# Patient Record
Sex: FEMALE | Race: WHITE | NOT HISPANIC OR LATINO | Employment: UNEMPLOYED | URBAN - METROPOLITAN AREA
[De-identification: names, ages, dates, MRNs, and addresses within clinical notes are randomized per-mention and may not be internally consistent; named-entity substitution may affect disease eponyms.]

---

## 2017-08-23 ENCOUNTER — HOSPITAL ENCOUNTER (EMERGENCY)
Facility: HOSPITAL | Age: 82
Discharge: HOME | End: 2017-08-23
Attending: EMERGENCY MEDICINE
Payer: MEDICARE

## 2017-08-23 ENCOUNTER — APPOINTMENT (EMERGENCY)
Dept: CT IMAGING | Facility: HOSPITAL | Age: 82
End: 2017-08-23
Payer: MEDICARE

## 2017-08-23 ENCOUNTER — APPOINTMENT (EMERGENCY)
Dept: RADIOLOGY | Facility: HOSPITAL | Age: 82
End: 2017-08-23
Payer: MEDICARE

## 2017-08-23 VITALS
DIASTOLIC BLOOD PRESSURE: 60 MMHG | TEMPERATURE: 98.4 F | BODY MASS INDEX: 31.08 KG/M2 | WEIGHT: 158.3 LBS | RESPIRATION RATE: 18 BRPM | HEART RATE: 61 BPM | OXYGEN SATURATION: 97 % | HEIGHT: 60 IN | SYSTOLIC BLOOD PRESSURE: 124 MMHG

## 2017-08-23 DIAGNOSIS — J20.9 BRONCHITIS, ACUTE: Primary | ICD-10-CM

## 2017-08-23 DIAGNOSIS — Z79.01 ANTICOAGULANT LONG-TERM USE: ICD-10-CM

## 2017-08-23 DIAGNOSIS — R91.8 PULMONARY NODULES: ICD-10-CM

## 2017-08-23 LAB
ALBUMIN SERPL BCP-MCNC: 3.8 G/DL (ref 3.5–5)
ALP SERPL-CCNC: 102 U/L (ref 46–116)
ALT SERPL W P-5'-P-CCNC: 21 U/L (ref 12–78)
ANION GAP SERPL CALCULATED.3IONS-SCNC: 11 MMOL/L (ref 4–13)
AST SERPL W P-5'-P-CCNC: 18 U/L (ref 5–45)
BASOPHILS # BLD AUTO: 0.05 THOUSANDS/ΜL (ref 0–0.1)
BASOPHILS NFR BLD AUTO: 1 % (ref 0–1)
BILIRUB SERPL-MCNC: 0.4 MG/DL (ref 0.2–1)
BUN SERPL-MCNC: 17 MG/DL (ref 5–25)
CALCIUM SERPL-MCNC: 9.1 MG/DL (ref 8.3–10.1)
CHLORIDE SERPL-SCNC: 98 MMOL/L (ref 100–108)
CO2 SERPL-SCNC: 26 MMOL/L (ref 21–32)
CREAT SERPL-MCNC: 1.09 MG/DL (ref 0.6–1.3)
EOSINOPHIL # BLD AUTO: 0.23 THOUSAND/ΜL (ref 0–0.61)
EOSINOPHIL NFR BLD AUTO: 4 % (ref 0–6)
ERYTHROCYTE [DISTWIDTH] IN BLOOD BY AUTOMATED COUNT: 13.2 % (ref 11.6–15.1)
GFR SERPL CREATININE-BSD FRML MDRD: 46 ML/MIN/1.73SQ M
GLUCOSE SERPL-MCNC: 115 MG/DL (ref 65–140)
HCT VFR BLD AUTO: 34.7 % (ref 34.8–46.1)
HGB BLD-MCNC: 12 G/DL (ref 11.5–15.4)
INR PPP: 1.71 (ref 0.86–1.16)
LYMPHOCYTES # BLD AUTO: 1.61 THOUSANDS/ΜL (ref 0.6–4.47)
LYMPHOCYTES NFR BLD AUTO: 29 % (ref 14–44)
MCH RBC QN AUTO: 31.6 PG (ref 26.8–34.3)
MCHC RBC AUTO-ENTMCNC: 34.6 G/DL (ref 31.4–37.4)
MCV RBC AUTO: 91 FL (ref 82–98)
MONOCYTES # BLD AUTO: 0.54 THOUSAND/ΜL (ref 0.17–1.22)
MONOCYTES NFR BLD AUTO: 10 % (ref 4–12)
NEUTROPHILS # BLD AUTO: 3.19 THOUSANDS/ΜL (ref 1.85–7.62)
NEUTS SEG NFR BLD AUTO: 57 % (ref 43–75)
NRBC BLD AUTO-RTO: 0 /100 WBCS
PLATELET # BLD AUTO: 240 THOUSANDS/UL (ref 149–390)
PMV BLD AUTO: 8.8 FL (ref 8.9–12.7)
POTASSIUM SERPL-SCNC: 3.5 MMOL/L (ref 3.5–5.3)
PROT SERPL-MCNC: 7.5 G/DL (ref 6.4–8.2)
PROTHROMBIN TIME: 20.5 SECONDS (ref 12.1–14.4)
RBC # BLD AUTO: 3.8 MILLION/UL (ref 3.81–5.12)
SODIUM SERPL-SCNC: 135 MMOL/L (ref 136–145)
SPECIMEN SOURCE: NORMAL
TROPONIN I BLD-MCNC: 0 NG/ML (ref 0–0.08)
TROPONIN I SERPL-MCNC: <0.02 NG/ML
WBC # BLD AUTO: 5.64 THOUSAND/UL (ref 4.31–10.16)

## 2017-08-23 PROCEDURE — 71275 CT ANGIOGRAPHY CHEST: CPT

## 2017-08-23 PROCEDURE — 85610 PROTHROMBIN TIME: CPT | Performed by: EMERGENCY MEDICINE

## 2017-08-23 PROCEDURE — 93005 ELECTROCARDIOGRAM TRACING: CPT | Performed by: EMERGENCY MEDICINE

## 2017-08-23 PROCEDURE — 99285 EMERGENCY DEPT VISIT HI MDM: CPT

## 2017-08-23 PROCEDURE — 84484 ASSAY OF TROPONIN QUANT: CPT

## 2017-08-23 PROCEDURE — 36415 COLL VENOUS BLD VENIPUNCTURE: CPT

## 2017-08-23 PROCEDURE — 71020 HB CHEST X-RAY 2VW FRONTAL&LATL: CPT

## 2017-08-23 PROCEDURE — 80053 COMPREHEN METABOLIC PANEL: CPT | Performed by: EMERGENCY MEDICINE

## 2017-08-23 PROCEDURE — 84484 ASSAY OF TROPONIN QUANT: CPT | Performed by: EMERGENCY MEDICINE

## 2017-08-23 PROCEDURE — 85025 COMPLETE CBC W/AUTO DIFF WBC: CPT | Performed by: EMERGENCY MEDICINE

## 2017-08-23 RX ORDER — DOXYCYCLINE HYCLATE 100 MG/1
100 CAPSULE ORAL 2 TIMES DAILY
Qty: 14 CAPSULE | Refills: 0 | Status: SHIPPED | OUTPATIENT
Start: 2017-08-23 | End: 2017-08-30

## 2017-08-23 RX ORDER — WARFARIN SODIUM 7.5 MG/1
7.5 TABLET ORAL
COMMUNITY
End: 2020-06-17 | Stop reason: HOSPADM

## 2017-08-23 RX ORDER — DOXYCYCLINE HYCLATE 100 MG/1
100 CAPSULE ORAL ONCE
Status: COMPLETED | OUTPATIENT
Start: 2017-08-23 | End: 2017-08-23

## 2017-08-23 RX ORDER — BENAZEPRIL HYDROCHLORIDE AND HYDROCHLOROTHIAZIDE 20; 12.5 MG/1; MG/1
1 TABLET ORAL DAILY
Qty: 14 TABLET | Refills: 0 | Status: SHIPPED | OUTPATIENT
Start: 2017-08-23 | End: 2020-06-17 | Stop reason: HOSPADM

## 2017-08-23 RX ADMIN — IOHEXOL 85 ML: 350 INJECTION, SOLUTION INTRAVENOUS at 19:50

## 2017-08-23 RX ADMIN — SODIUM CHLORIDE 500 ML: 0.9 INJECTION, SOLUTION INTRAVENOUS at 20:00

## 2017-08-23 RX ADMIN — DOXYCYCLINE HYCLATE 100 MG: 100 CAPSULE, GELATIN COATED ORAL at 21:38

## 2017-08-24 LAB
ATRIAL RATE: 62 BPM
P AXIS: 68 DEGREES
PR INTERVAL: 218 MS
QRS AXIS: -54 DEGREES
QRSD INTERVAL: 98 MS
QT INTERVAL: 480 MS
QTC INTERVAL: 487 MS
T WAVE AXIS: 50 DEGREES
VENTRICULAR RATE: 62 BPM

## 2018-03-10 ENCOUNTER — APPOINTMENT (EMERGENCY)
Dept: CT IMAGING | Facility: HOSPITAL | Age: 83
End: 2018-03-10
Payer: MEDICARE

## 2018-03-10 ENCOUNTER — HOSPITAL ENCOUNTER (EMERGENCY)
Facility: HOSPITAL | Age: 83
Discharge: HOME | End: 2018-03-11
Attending: EMERGENCY MEDICINE | Admitting: EMERGENCY MEDICINE
Payer: MEDICARE

## 2018-03-10 ENCOUNTER — APPOINTMENT (EMERGENCY)
Dept: RADIOLOGY | Facility: HOSPITAL | Age: 83
End: 2018-03-10
Payer: MEDICARE

## 2018-03-10 DIAGNOSIS — J20.9 ACUTE BRONCHITIS: ICD-10-CM

## 2018-03-10 DIAGNOSIS — R09.81 NASAL CONGESTION: Primary | ICD-10-CM

## 2018-03-10 DIAGNOSIS — R09.82 POST-NASAL DRIP: ICD-10-CM

## 2018-03-10 LAB
ANION GAP SERPL CALCULATED.3IONS-SCNC: 14 MMOL/L (ref 4–13)
BASOPHILS # BLD AUTO: 0.06 THOUSANDS/ΜL (ref 0–0.1)
BASOPHILS NFR BLD AUTO: 1 % (ref 0–1)
BUN SERPL-MCNC: 19 MG/DL (ref 5–25)
CALCIUM SERPL-MCNC: 8.5 MG/DL (ref 8.3–10.1)
CHLORIDE SERPL-SCNC: 102 MMOL/L (ref 100–108)
CO2 SERPL-SCNC: 23 MMOL/L (ref 21–32)
CREAT SERPL-MCNC: 0.98 MG/DL (ref 0.6–1.3)
EOSINOPHIL # BLD AUTO: 0.21 THOUSAND/ΜL (ref 0–0.61)
EOSINOPHIL NFR BLD AUTO: 3 % (ref 0–6)
ERYTHROCYTE [DISTWIDTH] IN BLOOD BY AUTOMATED COUNT: 14.1 % (ref 11.6–15.1)
GFR SERPL CREATININE-BSD FRML MDRD: 52 ML/MIN/1.73SQ M
GLUCOSE SERPL-MCNC: 103 MG/DL (ref 65–140)
HCT VFR BLD AUTO: 34.5 % (ref 34.8–46.1)
HGB BLD-MCNC: 11.7 G/DL (ref 11.5–15.4)
INR PPP: 1.21 (ref 0.86–1.16)
LYMPHOCYTES # BLD AUTO: 2.05 THOUSANDS/ΜL (ref 0.6–4.47)
LYMPHOCYTES NFR BLD AUTO: 32 % (ref 14–44)
MCH RBC QN AUTO: 31.7 PG (ref 26.8–34.3)
MCHC RBC AUTO-ENTMCNC: 33.9 G/DL (ref 31.4–37.4)
MCV RBC AUTO: 94 FL (ref 82–98)
MONOCYTES # BLD AUTO: 0.52 THOUSAND/ΜL (ref 0.17–1.22)
MONOCYTES NFR BLD AUTO: 8 % (ref 4–12)
NEUTROPHILS # BLD AUTO: 3.54 THOUSANDS/ΜL (ref 1.85–7.62)
NEUTS SEG NFR BLD AUTO: 55 % (ref 43–75)
NRBC BLD AUTO-RTO: 0 /100 WBCS
PLATELET # BLD AUTO: 201 THOUSANDS/UL (ref 149–390)
PMV BLD AUTO: 9.8 FL (ref 8.9–12.7)
POTASSIUM SERPL-SCNC: 3.2 MMOL/L (ref 3.5–5.3)
PROTHROMBIN TIME: 15.6 SECONDS (ref 12.1–14.4)
RBC # BLD AUTO: 3.69 MILLION/UL (ref 3.81–5.12)
SODIUM SERPL-SCNC: 139 MMOL/L (ref 136–145)
WBC # BLD AUTO: 6.4 THOUSAND/UL (ref 4.31–10.16)

## 2018-03-10 PROCEDURE — 93005 ELECTROCARDIOGRAM TRACING: CPT

## 2018-03-10 PROCEDURE — 85025 COMPLETE CBC W/AUTO DIFF WBC: CPT | Performed by: EMERGENCY MEDICINE

## 2018-03-10 PROCEDURE — 36415 COLL VENOUS BLD VENIPUNCTURE: CPT | Performed by: EMERGENCY MEDICINE

## 2018-03-10 PROCEDURE — 94640 AIRWAY INHALATION TREATMENT: CPT

## 2018-03-10 PROCEDURE — 71046 X-RAY EXAM CHEST 2 VIEWS: CPT

## 2018-03-10 PROCEDURE — 85610 PROTHROMBIN TIME: CPT | Performed by: EMERGENCY MEDICINE

## 2018-03-10 PROCEDURE — 71275 CT ANGIOGRAPHY CHEST: CPT

## 2018-03-10 PROCEDURE — 80048 BASIC METABOLIC PNL TOTAL CA: CPT | Performed by: EMERGENCY MEDICINE

## 2018-03-10 RX ORDER — ALBUTEROL SULFATE 2.5 MG/3ML
2.5 SOLUTION RESPIRATORY (INHALATION) ONCE
Status: COMPLETED | OUTPATIENT
Start: 2018-03-10 | End: 2018-03-10

## 2018-03-10 RX ADMIN — ALBUTEROL SULFATE 2.5 MG: 2.5 SOLUTION RESPIRATORY (INHALATION) at 20:34

## 2018-03-10 RX ADMIN — IOHEXOL 85 ML: 350 INJECTION, SOLUTION INTRAVENOUS at 23:02

## 2018-03-11 VITALS
HEART RATE: 58 BPM | DIASTOLIC BLOOD PRESSURE: 78 MMHG | SYSTOLIC BLOOD PRESSURE: 171 MMHG | RESPIRATION RATE: 16 BRPM | BODY MASS INDEX: 31.82 KG/M2 | OXYGEN SATURATION: 96 % | WEIGHT: 162.92 LBS | TEMPERATURE: 98.8 F

## 2018-03-11 PROCEDURE — 99285 EMERGENCY DEPT VISIT HI MDM: CPT

## 2018-03-11 RX ORDER — ALBUTEROL SULFATE 90 UG/1
2 AEROSOL, METERED RESPIRATORY (INHALATION) EVERY 4 HOURS PRN
Qty: 1 INHALER | Refills: 0 | Status: SHIPPED | OUTPATIENT
Start: 2018-03-11

## 2018-03-11 RX ORDER — FLUTICASONE PROPIONATE 50 MCG
1 SPRAY, SUSPENSION (ML) NASAL DAILY PRN
Qty: 16 G | Refills: 0 | Status: SHIPPED | OUTPATIENT
Start: 2018-03-11
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
## 2018-03-11 NOTE — DISCHARGE INSTRUCTIONS
Take the Flonase as prescribed  Use your saline nasal spray as needed to help with your congestion  You can take Mucinex as needed to help with continued congestion  Use the albuterol as needed for any coughing spells or feeling like her chest is getting tight again  Make sure you drink plenty of fluids while you are sick  Use a humidifier at night to help  Follow up with your primary care doctor to make sure you are doing better  Take your Coumadin as prescribed, and follow up closely with your doctor for further monitoring of this, as your level was low today  Acute Bronchitis   WHAT YOU SHOULD KNOW:   Acute bronchitis is swelling and irritation in the air passages of your lungs  This irritation may cause you to cough or have other breathing problems  Acute bronchitis often starts because of another viral illness, such as a cold or the flu  The illness spreads from your nose and throat to your windpipe and airways  Bronchitis is often called a chest cold  Acute bronchitis lasts about 2 weeks and is usually not a serious illness  AFTER YOU LEAVE:   Medicines:   · Ibuprofen or acetaminophen:  These medicines help lower a fever  They are available without a doctor's order  Ask your healthcare provider which medicine is right for you  Ask how much to take and how often to take it  Follow directions  These medicines can cause stomach bleeding if not taken correctly  Ibuprofen can cause kidney damage  Do not take ibuprofen if you have kidney disease, an ulcer, or allergies to aspirin  Acetaminophen can cause liver damage  Do not drink alcohol if you take acetaminophen  · Cough medicine: This medicine helps loosen mucus in your lungs and make it easier to cough up  This can help you breathe easier  · Inhalers: You may need one or more inhalers to help you breathe easier and cough less  An inhaler gives your medicine in a mist form so that you can breathe it into your lungs   Ask your healthcare provider to show you how to use your inhaler correctly  · Steroid medicine:  Steroid medicine helps open your air passages so you can breathe easier  · Take your medicine as directed  Call your healthcare provider if you think your medicine is not helping or if you have side effects  Tell him if you are allergic to any medicine  Keep a list of the medicines, vitamins, and herbs you take  Include the amounts, and when and why you take them  Bring the list or the pill bottles to follow-up visits  Carry your medicine list with you in case of an emergency  How to use an inhaler:   · Shake the inhaler well to make sure you get the correct amount of medicine per puff  Remove the cover from your inhaler's mouthpiece  If you are using a spacer, connect your inhaler to the flat end of the spacer  · Exhale as much air from your lungs as you can  Put the mouthpiece in your mouth past your front teeth and rest it on the top of your tongue  Do not block the mouthpiece opening with your tongue  · Breathe in through your mouth at a slow and steady rate  As you do this, press the inhaler to release the puff of medicine  Finish breathing in slowly and deeply as you inhale the medicine  When your lungs are full, hold your breath for 10 seconds  Then breathe out slowly through puckered lips or through your nose  · If you need to take more puffs, wait at least 1 minute between each puff  · Rinse your mouth with water after you use the inhaler  This may keep you from getting a mouth infection or irritation  · Follow the instructions that come with your inhaler to clean it  You should clean your inhaler at least once a week  Ways to care for yourself:   · Avoid alcohol:  Alcohol dulls your urge to cough and sneeze  When you have bronchitis, you need to be able to cough and sneeze to clear your air passages  Alcohol also causes your body to lose fluid   This can make the mucus in your lungs thicker and harder to cough up  · Avoid irritants in the air:  Do not smoke or allow others to smoke around you  Avoid chemicals, fumes, and dust  Wear a face mask if you must work around dust or fumes  Stay inside on days when air pollution levels are high  If you have allergies, stay inside when pollen counts are high  Avoid aerosol products  This includes spray-on deodorant, bug spray, and hair spray  · Drink more liquids:  Most people should drink at least 8 eight-ounce cups of water a day  You may need to drink more liquids when you have acute bronchitis  Liquids help keep your air passages moist and help you cough up mucus  · Get more rest:  You may feel like resting more  Slowly start to do more each day  Rest when you feel it is needed  · Eat healthy foods:  Eat a variety healthy foods every day  Your diet should include fruits, vegetables, breads, and protein (such as chicken, fish, and beans)  Dairy products (such as milk, cheese, and ice cream) can sometimes increase the amount of mucus your body makes  Ask if you should decrease your intake of dairy products  · Use a humidifier:  Use a cool mist humidifier to increase air moisture in your home  This may make it easier for you to breathe and help decrease your cough  Decrease your risk of acute bronchitis:   · Get the vaccinations you need:  Ask your healthcare provider if you should get vaccinated against the flu or pneumonia  · Avoid things that may irritate your lungs:  Stay inside or cover your mouth and nose with a scarf when you are outside during cold weather  You should also stay inside on days when air pollution levels are high  If you have allergies, stay inside when pollen counts are high  Avoid using aerosol products in your home  This includes spray-on deodorant, bug spray, and hair spray  · Avoid the spread of germs:        [facility] your hands often with soap and water  Carry germ-killing gel with you   You can use the gel to clean your hands when there is no soap and water available  ¨ Do not touch your eyes, nose, or mouth unless you have washed your hands first     ¨ Always cover your mouth when you cough  Cough into a tissue or your shirtsleeve so you do not spread germs from your hands  ¨ Try to avoid people who have a cold or the flu  If you are sick, stay away from others as much as possible  Follow up with your healthcare provider as directed:  Write down questions you have so you will remember to ask them during your follow-up visits  Contact your healthcare provider if:   · You have a fever  · Your skin becomes itchy or you have a rash after you take your medicine  · Your breathing problems do not go away or get worse  · Your cough does not get better with treatment  · You cough up blood  · You have questions or concerns about your condition or care  Seek care immediately or call 911 if:   · You faint  · Your lips or fingernails turn blue  · You feel like you are not getting enough air when you breathe  · You have swelling of your lips, tongue, or throat that makes it hard to breathe or swallow  © 2014 [address] is for End User's use only and may not be sold, redistributed or otherwise used for commercial purposes  All illustrations and images included in CareNotes® are the copyrighted property of [employer]  or Terry Benavides  The above information is an  only  It is not intended as medical advice for individual conditions or treatments  Talk to your doctor, nurse or pharmacist before following any medical regimen to see if it is safe and effective for you  Rhinosinusitis   WHAT YOU NEED TO KNOW:   Rhinosinusitis (RS) is inflammation of your nose and sinuses  It commonly begins as a virus, often as a common cold  Viruses usually last 7 to 10 days and do not need treatment   When the virus does not get better on its own, you may have bacterial RS  This means that bacteria have begun to grow inside your sinuses  Acute RS lasts less than 4 weeks  Chronic RS lasts 12 weeks or more  Recurrent RS is when you have 4 or more episodes of RS in one year  DISCHARGE INSTRUCTIONS:   Return to the emergency department if:   · Your eye and eyelid are red, swollen, and painful  · You cannot open your eye  · You have double vision or you cannot see  · Your eyeball bulges out or you cannot move your eye  · You are more sleepy than normal or you notice changes in your ability to think, move, or talk  · You have a stiff neck, a fever, or a bad headache  · You have swelling of your forehead or scalp  Contact your healthcare provider if:   · Your symptoms are worse or do not improve after 3 to 5 days of treatment  · You have questions or concerns about your condition or care  Medicines: You may need any of the following:  · Acetaminophen  decreases pain and fever  It is available without a doctor's order  Ask how much to take and how often to take it  Follow directions  Acetaminophen can cause liver damage if not taken correctly  · NSAIDs , such as ibuprofen, help decrease swelling, pain, and fever  This medicine is available with or without a doctor's order  NSAIDs can cause stomach bleeding or kidney problems in certain people  If you take blood thinner medicine, always ask your healthcare provider if NSAIDs are safe for you  Always read the medicine label and follow directions  · Nasal steroid sprays  decrease inflammation in your nose and sinuses  · Decongestants  reduce swelling and drain mucus in the nose and sinuses  They may help you breathe easier  · Antihistamines  dry mucus in the nose and relieve sneezing  · Antibiotics  treat a bacterial infection and may be needed if your symptoms do not improve or they get worse  · Take your medicine as directed    Contact your healthcare provider if you think your medicine is not helping or if you have side effects  Tell him or her if you are allergic to any medicine  Keep a list of the medicines, vitamins, and herbs you take  Include the amounts, and when and why you take them  Bring the list or the pill bottles to follow-up visits  Carry your medicine list with you in case of an emergency  Self-care:   · Rinse your sinuses  Use a sinus rinse device to rinse your nasal passages with a saline (salt water) solution  This will help thin the mucus in your nose and rinse away pollen and dirt  It will also help reduce swelling so you can breathe normally  Ask your healthcare provider how often to do this  · Breathe in steam   Heat a bowl of water until you see steam  Lean over the bowl and make a tent over your head with a large towel  Breathe deeply for about 20 minutes  Be careful not to get too close to the steam or burn yourself  Do this 3 times a day  You can also breathe deeply when you take a hot shower  · Sleep with your head elevated  Place an extra pillow under your head before you go to sleep to help your sinuses drain  · Drink liquids as directed  Ask your healthcare provider how much liquid to drink each day and which liquids are best for you  Liquids will thin the mucus in your nose and help it drain  Avoid drinks that contain alcohol or caffeine  · Do not smoke, and avoid secondhand smoke  Nicotine and other chemicals in cigarettes and cigars can make your symptoms worse  Ask your healthcare provider for information if you currently smoke and need help to quit  E-cigarettes or smokeless tobacco still contain nicotine  Talk to your healthcare provider before you use these products  Follow up with your healthcare provider as directed: Follow up if your symptoms are worse or not better after 3 to 5 days of treatment  Write down your questions so you remember to ask them during your visits    © 2017 [employer] Information is for End User's use only and may not be sold, redistributed or otherwise used for commercial purposes  All illustrations and images included in CareNotes® are the copyrighted property of [employer]  or Terry Benavides  The above information is an  only  It is not intended as medical advice for individual conditions or treatments  Talk to your doctor, nurse or pharmacist before following any medical regimen to see if it is safe and effective for you  Postnasal Drip   AMBULATORY CARE:   Postnasal drip  is a condition that causes a large amount of mucus to collect in your throat or nose  It may also be called upper airway cough syndrome because the mucus causes repeated coughing  You may have a sore throat, or throat tissues may swell  This may feel like a lump in your throat  You may also feel like you need to clear your throat often  Contact your healthcare provider if:   · You have trouble breathing because of the mucus  · You have new or worsening symptoms, even with treatment  · You have signs of an infection, such as yellow or green mucus, or a fever  · You have questions or concerns about your condition or care  Treatment  may include any of the following:  · Medicines  may be given to thin the mucus  You may need to swallow the medicine or use a device to flush your sinuses with liquid squirted into your nose  Nasal sprays may also be needed to keep the tissues in your nose moist  Medicines can also relieve congestion  Allergy medicine may help if your symptoms are caused by seasonal allergies, such as hay fever  You may need medicine to help control GERD  · Antibiotics  may be needed to treat a bacterial infection  Manage postnasal drip:   · Use a humidifier or vaporizer  Use a cool mist humidifier or a vaporizer to increase air moisture in your home  This may make it easier for you to breathe  · Drink more liquids as directed    Liquids help keep your air passages moist and help you cough up mucus  Ask how much liquid to drink each day and which liquids are best for you  · Avoid cold air and dry, heated air  Cold or dry air can trigger postnasal drip  Try to stay inside on cold days, or keep your mouth covered  Do not stay long in areas that have dry, heated air  · Do not smoke, and avoid secondhand smoke  Nicotine and other chemicals in cigarettes and cigars can irritate your throat and make coughing worse  Ask your healthcare provider for information if you currently smoke and need help to quit  E-cigarettes or smokeless tobacco still contain nicotine  Talk to your healthcare provider before you use these products  Follow up with your healthcare provider as directed:  Write down your questions so you remember to ask them during your visits  © 2017 [address] [facility] Information is for End User's use only and may not be sold, redistributed or otherwise used for commercial purposes  All illustrations and images included in CareNotes® are the copyrighted property of [employer]  or Terry Benavides  The above information is an  only  It is not intended as medical advice for individual conditions or treatments  Talk to your doctor, nurse or pharmacist before following any medical regimen to see if it is safe and effective for you

## 2018-03-12 LAB
ATRIAL RATE: 60 BPM
P AXIS: 66 DEGREES
PR INTERVAL: 240 MS
QRS AXIS: -35 DEGREES
QRSD INTERVAL: 102 MS
QT INTERVAL: 480 MS
QTC INTERVAL: 480 MS
T WAVE AXIS: 8 DEGREES
VENTRICULAR RATE: 60 BPM

## 2018-03-12 PROCEDURE — 93010 ELECTROCARDIOGRAM REPORT: CPT | Performed by: INTERNAL MEDICINE

## 2018-12-25 ENCOUNTER — HOSPITAL ENCOUNTER (EMERGENCY)
Facility: HOSPITAL | Age: 83
Discharge: HOME | End: 2018-12-26
Attending: EMERGENCY MEDICINE | Admitting: EMERGENCY MEDICINE
Payer: MEDICARE

## 2018-12-25 DIAGNOSIS — S09.90XA MINOR HEAD INJURY, INITIAL ENCOUNTER: ICD-10-CM
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
## 2018-12-25 DIAGNOSIS — S01.111A EYEBROW LACERATION, RIGHT, INITIAL ENCOUNTER: ICD-10-CM

## 2018-12-25 DIAGNOSIS — Z79.01 ANTICOAGULATED: ICD-10-CM

## 2018-12-25 PROCEDURE — 93005 ELECTROCARDIOGRAM TRACING: CPT

## 2018-12-25 PROCEDURE — 99284 EMERGENCY DEPT VISIT MOD MDM: CPT

## 2018-12-25 RX ORDER — ACETAMINOPHEN 325 MG/1
975 TABLET ORAL ONCE
Status: COMPLETED | OUTPATIENT
Start: 2018-12-26 | End: 2018-12-26

## 2018-12-25 RX ORDER — LIDOCAINE HYDROCHLORIDE AND EPINEPHRINE 10; 10 MG/ML; UG/ML
20 INJECTION, SOLUTION INFILTRATION; PERINEURAL ONCE
Status: COMPLETED | OUTPATIENT
Start: 2018-12-26 | End: 2018-12-26

## 2018-12-26 ENCOUNTER — APPOINTMENT (EMERGENCY)
Dept: CT IMAGING | Facility: HOSPITAL | Age: 83
End: 2018-12-26
Payer: MEDICARE

## 2018-12-26 VITALS
RESPIRATION RATE: 40 BRPM | SYSTOLIC BLOOD PRESSURE: 134 MMHG | WEIGHT: 160.94 LBS | BODY MASS INDEX: 31.43 KG/M2 | HEART RATE: 56 BPM | OXYGEN SATURATION: 94 % | DIASTOLIC BLOOD PRESSURE: 55 MMHG

## 2018-12-26 LAB
INR PPP: 3.06 (ref 0.86–1.17)
PROTHROMBIN TIME: 31.2 SECONDS (ref 11.8–14.2)

## 2018-12-26 PROCEDURE — 36415 COLL VENOUS BLD VENIPUNCTURE: CPT | Performed by: EMERGENCY MEDICINE

## 2018-12-26 PROCEDURE — 70450 CT HEAD/BRAIN W/O DYE: CPT

## 2018-12-26 PROCEDURE — 85610 PROTHROMBIN TIME: CPT | Performed by: EMERGENCY MEDICINE

## 2018-12-26 RX ADMIN — ACETAMINOPHEN 975 MG: 325 TABLET, FILM COATED ORAL at 00:10

## 2018-12-26 RX ADMIN — LIDOCAINE HYDROCHLORIDE,EPINEPHRINE BITARTRATE 20 ML: 10; .01 INJECTION, SOLUTION INFILTRATION; PERINEURAL at 00:10

## 2018-12-26 RX ADMIN — Medication 1 APPLICATION: at 00:16

## 2018-12-26 NOTE — ED PROVIDER NOTES
H&P Exam - Trauma   Jesus Daley 80 y o  female MRN: [id number]  Unit/Bed#: ED 13/ED 13 Encounter: [id number]    Assessment/Plan   Trauma Alert: Trauma Acuity: Trauma Evaluation  Model of Arrival:   via    Trauma Team: Current Providers  Attending Provider: Ruby Franco DO  Registered Nurse: Daylene Brittle, RN  Consultants: None    Trauma Active Problems:   1) Minor head injury  2) Facial Laceration  3) Anticoagulated on coumadin  4) Mechanical Fall    Trauma Plan:   1) Minor head injury- CT head negative, clears Nexus criteria  2) Facial laceration- tetanus UTD, repaired as noted  3) Anticoagulated - INR noted and reviewed with pt  4) Mechanical fall- tertiary exam completed, no further injuries or findings, able to ambulate unassisted, feels safe going home in care or family    Chief Complaint:   Chief Complaint   Patient presents with    Fall     pt fell in her bathroom, denies LOC on thiners pt thinks she hit her head on the tub, has a lac on her head       History of Present Illness   HPI:  Jesus Daley is a 80 y o  female who presents with fall and head injury  Mechanism: Mechanical fall off toilet          80year-old female history of factor 5 Leiden on Coumadin presenting with head injury  Patient is here with family, she was self cathing herself on the bathroom toilet she leaned forward lost her balance fell forward striking her right eyebrow on the bathtub  She did not not syncopize or lose consciousness she had bleeding from her right eyebrow she was able to get up and ambulate she is here with family for further evaluation    Patient was concerned about the head injury on Coumadin, she notes a large laceration above her right eyebrow but otherwise denies severe headache neck pain or stiffness weakness with paresthesias or anesthesia she denies having eye pain or visual disturbance facial pain difficulty swallowing chest pain cough shortness of breath nausea vomiting or abdominal pain back pain change in bowels or bladder other muscle skeletal complaint or injury  Complete review systems otherwise negative as noted          Review of Systems   Constitutional: Negative for activity change, appetite change, chills and fever  HENT: Negative for dental problem, nosebleeds, trouble swallowing and voice change  Eyes: Negative for photophobia, pain, redness and visual disturbance  Respiratory: Negative for cough and shortness of breath  Cardiovascular: Negative for chest pain and palpitations  Gastrointestinal: Negative for abdominal pain, nausea and vomiting  Genitourinary: Negative for dysuria, frequency and urgency  Musculoskeletal: Negative for arthralgias, back pain, gait problem, joint swelling, myalgias, neck pain and neck stiffness  Skin: Positive for wound  Negative for color change and rash  Neurological: Negative for dizziness, syncope, weakness, light-headedness and headaches  Hematological: Negative for adenopathy  Does not bruise/bleed easily  Psychiatric/Behavioral: Negative for agitation and behavioral problems  All other systems reviewed and are negative  Historical Information     Immunizations: There is no immunization history on file for this patient  Past Medical History:   Diagnosis Date    Cancer ([facility] )     BREAST , OVARIAN    Factor 5 Leiden mutation, heterozygous ([facility] )     Hypertension      History reviewed  No pertinent family history  Past Surgical History:   Procedure Laterality Date    BREAST SURGERY      HYSTERECTOMY         Social History     Social History    Marital status:       Spouse name: N/A    Number of children: N/A    Years of education: N/A     Social History Main Topics    Smoking status: Former Smoker    Smokeless tobacco: Never Used    Alcohol use 0 6 - 1 2 oz/week     1 - 2 Glasses of wine per week    Drug use: No    Sexual activity: Not Asked     Other Topics Concern    None     Social History Narrative    None       Family History: non-contributory    Meds/Allergies   Prior to Admission Medications   Prescriptions Last Dose Informant Patient Reported? Taking? albuterol (PROVENTIL HFA,VENTOLIN HFA) 90 mcg/act inhaler   No No   Sig: Inhale 2 puffs every 4 (four) hours as needed for wheezing or shortness of breath   benazepril-hydrochlorthiazide (LOTENSIN HCT) 20-12 5 MG per tablet   No No   Sig: Take 1 tablet by mouth daily   fluticasone (FLONASE) 50 mcg/act nasal spray   No No   Si spray into each nostril daily as needed for rhinitis   warfarin (COUMADIN) 7 5 mg tablet   Yes No   Sig: Take by mouth daily      Facility-Administered Medications: None       Allergies   Allergen Reactions    Amoxicillin      Pt does not remember     Ciprofloxacin     Penicillins        PHYSICAL EXAM    PE limited by: None    Objective   Vitals:   First set: Pulse: 55 (18 2345)  Respirations: 18 (18 2345)  Blood Pressure: (!) 171/78 (18 2348)  SpO2: 95 % (18 2350)    Primary Survey:   (A) Airway: INtact  (B) Breathing: Clear and equal  (C) Circulation: Pulses:   2+ radial and dp  (D) Disabliity:  GCS Total:  15  (E) Expose:  Completed    Secondary Survey: (Click on Physical Exam tab above)  Physical Exam   Constitutional: She is oriented to person, place, and time  She appears well-developed and well-nourished  No distress  HENT:   Head: Normocephalic  Right Ear: External ear normal    Left Ear: External ear normal    Nose: Nose normal    Mouth/Throat: Oropharynx is clear and moist    Patient has approximately 4 cm vertically oriented laceration through her right eyebrow just superior to the eyelid, her pupils are 3 mm equal reactive vision is intact at bedside extraocular motions are intact there is no conjunctival injection or subconjunctival hemorrhage no other periorbital tenderness or injuries head neck and face otherwise atraumatic nontender   Eyes: Pupils are equal, round, and reactive to light  Conjunctivae and EOM are normal  Right eye exhibits no discharge  Left eye exhibits no discharge  Neck: Normal range of motion  Neck supple  No tracheal deviation present  No midline tenderness of her cervical spine full range of motion without discomfort clears nexus criteria   Cardiovascular: Normal rate, regular rhythm, normal heart sounds and intact distal pulses  Pulmonary/Chest: Effort normal and breath sounds normal  No respiratory distress  She has no wheezes  She has no rales  Abdominal: Soft  Bowel sounds are normal  She exhibits no distension  There is no tenderness  There is no rebound and no guarding  Musculoskeletal: Normal range of motion  She exhibits no edema, tenderness or deformity  Patient has full active and passive range of motion of both the upper and lower extremities without pain tenderness or injury he has no tenderness over her back chest or abdomen no other acute traumatic ischemic infectious or inflammatory findings on exam   Neurological: She is alert and oriented to person, place, and time  No cranial nerve deficit  Coordination normal    Skin: Skin is warm and dry  No rash noted  No erythema  Psychiatric: She has a normal mood and affect  Her behavior is normal    Nursing note and vitals reviewed  Invasive Devices          No matching active lines, drains, or airways          Lab Results:   Results Reviewed     Procedure Component Value Units Date/Time    Protime-INR [[id number]]  (Abnormal) Collected:  12/26/18 0016    Lab Status:  Final result Specimen:  Blood from Arm, Right Updated:  12/26/18 0050     Protime 31 2 (H) seconds      INR 3 06 (H)                 Imaging Studies:   CT head without contrast   Final Result by Lilian Diamond MD (12/26 0015)   Right forehead laceration  No acute intracranial abnormality                    Workstation performed: [id number]             Other Studies:     Code Status: No Order  Advance Directive and Living Will:      Power of :    POLST:      Procedures  Lac Repair  Date/Time: 12/26/2018 1:20 AM  Performed by: Torie Feldman by: Melissa Staff   Consent: Verbal consent obtained  Risks and benefits: risks, benefits and alternatives were discussed  Consent given by: patient  Patient understanding: patient states understanding of the procedure being performed  Patient consent: the patient's understanding of the procedure matches consent given  Patient identity confirmed: verbally with patient and arm band  Body area: head/neck  Location details: right eyebrow  Laceration length: 4 cm  Foreign bodies: no foreign bodies  Tendon involvement: none  Nerve involvement: none  Vascular damage: no  Anesthesia: local infiltration    Anesthesia:  Local Anesthetic: lidocaine 1% with epinephrine and LET (lido,epi,tetracaine)  Anesthetic total: 2 mL    Sedation:  Patient sedated: no    Wound Dehiscence:    Secondary closure or dehiscence: complex    Procedure Details:  Preparation: Patient was prepped and draped in the usual sterile fashion    Irrigation solution: saline  Irrigation method: syringe  Amount of cleaning: extensive  Debridement: none  Number of sutures: 6  Technique: simple and horizontal mattress  Approximation: close  Approximation difficulty: simple  Patient tolerance: Patient tolerated the procedure well with no immediate complications  Comments: Patient had approximately 4 cm vertically oriented full-thickness laceration over her right medial eyebrow that extended from just above the eyelid on the forehead there is no involvement of the high did not involve the tarsal plate, the wound was copiously irrigated explored to the base, facial nerve and super orbital nerve appeared intact, a single half buried horizontal mattress stitch was 1st placed in the center of the wound where there is a small avulsion/flap, 5 additional simple interrupted sutures were placed all using 6-0 fast gut suture for total of 6 sutures  Patient opted for absorbable stitches, excellent approximation given extensive discharge return follow-up instructions             Phone Contacts  ED Phone Contact     ED Course  ED Course as of Dec 27 1007   Wed Dec 26, 2018   0119 INR: (!) 3 06         MDM  CritCare Time    Disposition  Final diagnoses:   Fall, initial encounter   Minor head injury, initial encounter   Eyebrow laceration, right, initial encounter   Anticoagulated     Time reflects when diagnosis was documented in both MDM as applicable and the Disposition within this note     Time User Action Codes Description Comment    12/26/2018  1:20 AM Hollis Horde Add [W19  NJEV] Fall, initial encounter     12/26/2018  1:20 AM Zwiebel, Juaquin Kanner W Add [S09 90XA] Minor head injury, initial encounter     12/26/2018  1:20 AM Zwiebel, Juaquin Kanner W Add [S01 01XA] Laceration of scalp, initial encounter     12/26/2018  1:20 AM Kristian Gutierrez Kanhelen W Remove [S01 01XA] Laceration of scalp, initial encounter     12/26/2018  1:20 AM Hollis Horde Add [V32 845L] Eyebrow laceration, right, initial encounter     12/26/2018  1:21 AM Hollis Horde Add [Z79 01] Anticoagulated       ED Disposition     ED Disposition Condition Comment    Discharge  Perla Gomez discharge to home/self care      Condition at discharge: Good        Follow-up Information     Follow up With Specialties Details Why Contact Info Additional Information    [facility] Emergency [facility]  If symptoms worsen [address] [facility] [address]  [phone] [place] [facility], [address] [place], [place], [facility], [address] In 1 week  [address] [facility] [address] [place] [address]  N  [phone]           Discharge Medication List as of 12/26/2018  1:21 AM      CONTINUE these medications which have NOT CHANGED    Details   albuterol (PROVENTIL HFA,VENTOLIN HFA) 90 mcg/act inhaler Inhale 2 puffs every 4 (four) hours as needed for wheezing or shortness of breath, Starting Sun 3/11/2018, Print      benazepril-hydrochlorthiazide (LOTENSIN HCT) 20-12 5 MG per tablet Take 1 tablet by mouth daily, Starting Wed 8/23/2017, Print      fluticasone (FLONASE) 50 mcg/act nasal spray 1 spray into each nostril daily as needed for rhinitis, Starting Sun 3/11/2018, Print      warfarin (COUMADIN) 7 5 mg tablet Take by mouth daily, Historical Med           No discharge procedures on file        ED Provider  Electronically Signed by         Fabio George DO  12/27/18 1007

## 2018-12-26 NOTE — DISCHARGE INSTRUCTIONS
Please return if you worsening or other concerning symptoms otherwise follow up as instructed as discussed    Facial Laceration   WHAT YOU NEED TO KNOW:   A facial laceration is a tear or cut in the skin caused by blunt or shearing forces, or sharp objects  Facial lacerations may be closed within 24 hours of injury  DISCHARGE INSTRUCTIONS:   Return to the emergency department if:   · You have a fever and the wound is painful, warm, or swollen  The wound area may be red, or fluid may come out of it  · You have heavy bleeding or bleeding that does not stop after 10 minutes of holding firm, direct pressure over the wound  Contact your healthcare provider if:   · Your wound reopens or your tape comes off  · Your wound is very painful  · Your wound is not healing, or you think there is an object in the wound  · The skin around your wound stays numb  · You have questions or concerns about your condition or care  Medicines:   · Antibiotics  may be given to prevent an infection if your wound was deep and had to be cleaned out  · Take your medicine as directed  Contact your healthcare provider if you think your medicine is not helping or if you have side effects  Tell him of her if you are allergic to any medicine  Keep a list of the medicines, vitamins, and herbs you take  Include the amounts, and when and why you take them  Bring the list or the pill bottles to follow-up visits  Carry your medicine list with you in case of an emergency  Care for your wound:  Care for your wound as directed to prevent infection and help it heal  Wash your hands with soap and warm water before and after you care for your wound  You may need to keep the wound dry for the first 24 to 48 hours  When your healthcare provider says it is okay, wash around your wound with soap and water, or as directed  Gently pat the area dry  Do not use alcohol or hydrogen peroxide to clean your wound unless you are directed to    · Do not take aspirin or NSAIDs for 24 hours after being injured  Aspirin and NSAIDs can increase blood flow  Your laceration may continue to bleed  · Do not take hot showers, eat or drink hot foods and liquids for 48 hours after being injured  Also, do not use a heating pad near your laceration  The heat can cause swelling in and around your laceration  · If your wound was covered with a bandage,  leave your bandage on as long as directed  Bandages keep your wound clean and protected  They can also prevent swelling  Ask when and how to change your bandage  Be careful not to apply the bandage or tape too tightly  This could cut off blood flow and cause more injury  · If your wound was closed with stitches,  keep your wound clean  Your healthcare provider may recommend that you apply antibiotic ointment after you clean your wound  · If your wound was closed with wound tape or medical strips,  keep the area clean and dry  The strips will usually fall off on their own after several days  · If your wound was closed with tissue glue,  do not use any ointments or lotions on the area  You may shower, but do not swim or soak in a bathtub  Gently pat the area dry after you take a shower  Do not pick at or scrub the glue area  Decrease scarring: The skin in the area of your wound may turn a different color if it is exposed to direct sunlight  After your wound is healed, use sunscreen over the area when you are out in the sun  You should do this for at least 6 months to 1 year after your injury  Some wounds scar less if they are covered while they heal   Follow up with your healthcare provider as directed: You may need to follow up with your healthcare provider in 24 to 48 hours to have your wound checked for infection  You may need to return in 3 to 5 days if you have stitches that need to be removed  Write down your questions so you remember to ask them during your visits    © 2017 [facility] Information is for End User's use only and may not be sold, redistributed or otherwise used for commercial purposes  All illustrations and images included in CareNotes® are the copyrighted property of A [employer] M [employer] Inc  or [employer]  The above information is an  only  It is not intended as medical advice for individual conditions or treatments  Talk to your doctor, nurse or pharmacist before following any medical regimen to see if it is safe and effective for you  Care For Your Absorbable Stitches   WHAT YOU NEED TO KNOW:   Absorbable stitches, or sutures, are used to close cuts or wounds  These stitches are absorbed by your body, or fall off on their own within days or weeks  They do not need to be removed  DISCHARGE INSTRUCTIONS:   Return to the emergency department if:   · Your wound comes apart  · You have red streaks around your wound  · You have swollen or painful lymph nodes  · Blood soaks through your bandage  Contact your healthcare provider if:   · You have signs of an infection, such as increased redness, pain, swelling, or pus  · You have a fever  · Your stitches do not absorb when your healthcare provider says they should  · You have questions or concerns about your condition or care  Care for your wound and absorbable stitches as directed:   · Protect your wound from further injury  Wear protective clothing over your wound, and protect it from sunlight  Do not place pressure on your wound  This could reopen your wound and increase your risk for an infection  · Elevate your wound  Keep your wound above the level of your heart as often as you can  This will help decrease swelling and pain  Prop your wounded area on pillows or blankets, if possible, to keep it elevated comfortably  · Wash and bandage your wound  Carefully wash the wound with soap and water  Gently dry the area and put on new, clean bandages as directed   Change your bandages when they get wet or dirty  · Take showers instead of baths  Wait 12 to 24 hours after you receive your stitches before you take a shower  Do not take a bath or swim  Follow up with your healthcare provider as directed:  Write down your questions so you remember to ask them during your visits  © 2017 [address] [employer] Information is for End User's use only and may not be sold, redistributed or otherwise used for commercial purposes  All illustrations and images included in CareNotes® are the copyrighted property of A D A M [employer]  or [employer]  The above information is an  only  It is not intended as medical advice for individual conditions or treatments  Talk to your doctor, nurse or pharmacist before following any medical regimen to see if it is safe and effective for you

## 2018-12-27 LAB
ATRIAL RATE: 57 BPM
P AXIS: 61 DEGREES
PR INTERVAL: 234 MS
QRS AXIS: -37 DEGREES
QRSD INTERVAL: 96 MS
QT INTERVAL: 498 MS
QTC INTERVAL: 484 MS
T WAVE AXIS: 5 DEGREES
VENTRICULAR RATE: 57 BPM

## 2018-12-27 PROCEDURE — 93010 ELECTROCARDIOGRAM REPORT: CPT | Performed by: INTERNAL MEDICINE

## 2019-05-27 ENCOUNTER — HOSPITAL ENCOUNTER (EMERGENCY)
Facility: HOSPITAL | Age: 84
Discharge: HOME | End: 2019-05-28
Attending: EMERGENCY MEDICINE | Admitting: EMERGENCY MEDICINE
Payer: MEDICARE

## 2019-05-27 ENCOUNTER — APPOINTMENT (EMERGENCY)
Dept: RADIOLOGY | Facility: HOSPITAL | Age: 84
End: 2019-05-27
Payer: MEDICARE

## 2019-05-27 DIAGNOSIS — R50.9 FEVER: Primary | ICD-10-CM

## 2019-05-27 LAB
ALBUMIN SERPL BCP-MCNC: 3.3 G/DL (ref 3.5–5)
ALP SERPL-CCNC: 79 U/L (ref 46–116)
ALT SERPL W P-5'-P-CCNC: 21 U/L (ref 12–78)
ANION GAP SERPL CALCULATED.3IONS-SCNC: 11 MMOL/L (ref 4–13)
AST SERPL W P-5'-P-CCNC: 24 U/L (ref 5–45)
BASOPHILS # BLD AUTO: 0.03 THOUSANDS/ΜL (ref 0–0.1)
BASOPHILS NFR BLD AUTO: 0 % (ref 0–1)
BILIRUB DIRECT SERPL-MCNC: 0.33 MG/DL (ref 0–0.2)
BILIRUB SERPL-MCNC: 1 MG/DL (ref 0.2–1)
BILIRUB UR QL STRIP: NEGATIVE
BUN SERPL-MCNC: 27 MG/DL (ref 5–25)
CALCIUM SERPL-MCNC: 8.6 MG/DL (ref 8.3–10.1)
CHLORIDE SERPL-SCNC: 93 MMOL/L (ref 100–108)
CLARITY UR: ABNORMAL
CO2 SERPL-SCNC: 26 MMOL/L (ref 21–32)
COLOR UR: YELLOW
CREAT SERPL-MCNC: 1.52 MG/DL (ref 0.6–1.3)
EOSINOPHIL # BLD AUTO: 0.02 THOUSAND/ΜL (ref 0–0.61)
EOSINOPHIL NFR BLD AUTO: 0 % (ref 0–6)
ERYTHROCYTE [DISTWIDTH] IN BLOOD BY AUTOMATED COUNT: 13.1 % (ref 11.6–15.1)
GFR SERPL CREATININE-BSD FRML MDRD: 30 ML/MIN/1.73SQ M
GLUCOSE SERPL-MCNC: 119 MG/DL (ref 65–140)
GLUCOSE UR STRIP-MCNC: NEGATIVE MG/DL
HCT VFR BLD AUTO: 33.9 % (ref 34.8–46.1)
HGB BLD-MCNC: 11.8 G/DL (ref 11.5–15.4)
HGB UR QL STRIP.AUTO: ABNORMAL
IMM GRANULOCYTES # BLD AUTO: 0.05 THOUSAND/UL (ref 0–0.2)
IMM GRANULOCYTES NFR BLD AUTO: 1 % (ref 0–2)
INR PPP: 1.81 (ref 0.86–1.17)
KETONES UR STRIP-MCNC: NEGATIVE MG/DL
LACTATE SERPL-SCNC: 1.6 MMOL/L (ref 0.5–2)
LEUKOCYTE ESTERASE UR QL STRIP: ABNORMAL
LYMPHOCYTES # BLD AUTO: 1.76 THOUSANDS/ΜL (ref 0.6–4.47)
LYMPHOCYTES NFR BLD AUTO: 19 % (ref 14–44)
MCH RBC QN AUTO: 33.4 PG (ref 26.8–34.3)
MCHC RBC AUTO-ENTMCNC: 34.8 G/DL (ref 31.4–37.4)
MCV RBC AUTO: 96 FL (ref 82–98)
MONOCYTES # BLD AUTO: 1.54 THOUSAND/ΜL (ref 0.17–1.22)
MONOCYTES NFR BLD AUTO: 17 % (ref 4–12)
NEUTROPHILS # BLD AUTO: 5.85 THOUSANDS/ΜL (ref 1.85–7.62)
NEUTS SEG NFR BLD AUTO: 63 % (ref 43–75)
NITRITE UR QL STRIP: NEGATIVE
NRBC BLD AUTO-RTO: 0 /100 WBCS
PH UR STRIP.AUTO: 6 [PH]
PLATELET # BLD AUTO: 175 THOUSANDS/UL (ref 149–390)
PMV BLD AUTO: 9.6 FL (ref 8.9–12.7)
POTASSIUM SERPL-SCNC: 3.5 MMOL/L (ref 3.5–5.3)
PROT SERPL-MCNC: 7.1 G/DL (ref 6.4–8.2)
PROT UR STRIP-MCNC: ABNORMAL MG/DL
PROTHROMBIN TIME: 20.7 SECONDS (ref 11.8–14.2)
RBC # BLD AUTO: 3.53 MILLION/UL (ref 3.81–5.12)
SODIUM SERPL-SCNC: 130 MMOL/L (ref 136–145)
SP GR UR STRIP.AUTO: 1.02 (ref 1–1.03)
UROBILINOGEN UR QL STRIP.AUTO: 0.2 E.U./DL
WBC # BLD AUTO: 9.25 THOUSAND/UL (ref 4.31–10.16)

## 2019-05-27 PROCEDURE — 96360 HYDRATION IV INFUSION INIT: CPT

## 2019-05-27 PROCEDURE — 85025 COMPLETE CBC W/AUTO DIFF WBC: CPT | Performed by: EMERGENCY MEDICINE

## 2019-05-27 PROCEDURE — 83605 ASSAY OF LACTIC ACID: CPT | Performed by: EMERGENCY MEDICINE

## 2019-05-27 PROCEDURE — 99283 EMERGENCY DEPT VISIT LOW MDM: CPT

## 2019-05-27 PROCEDURE — 80076 HEPATIC FUNCTION PANEL: CPT | Performed by: EMERGENCY MEDICINE

## 2019-05-27 PROCEDURE — 81001 URINALYSIS AUTO W/SCOPE: CPT | Performed by: EMERGENCY MEDICINE

## 2019-05-27 PROCEDURE — 87186 SC STD MICRODIL/AGAR DIL: CPT | Performed by: EMERGENCY MEDICINE

## 2019-05-27 PROCEDURE — 87077 CULTURE AEROBIC IDENTIFY: CPT | Performed by: EMERGENCY MEDICINE

## 2019-05-27 PROCEDURE — 36415 COLL VENOUS BLD VENIPUNCTURE: CPT | Performed by: EMERGENCY MEDICINE

## 2019-05-27 PROCEDURE — 80048 BASIC METABOLIC PNL TOTAL CA: CPT | Performed by: EMERGENCY MEDICINE

## 2019-05-27 PROCEDURE — 85610 PROTHROMBIN TIME: CPT | Performed by: EMERGENCY MEDICINE

## 2019-05-27 PROCEDURE — 71046 X-RAY EXAM CHEST 2 VIEWS: CPT

## 2019-05-27 PROCEDURE — 99282 EMERGENCY DEPT VISIT SF MDM: CPT | Performed by: EMERGENCY MEDICINE

## 2019-05-27 PROCEDURE — 87086 URINE CULTURE/COLONY COUNT: CPT | Performed by: EMERGENCY MEDICINE

## 2019-05-27 RX ADMIN — SODIUM CHLORIDE 500 ML: 0.9 INJECTION, SOLUTION INTRAVENOUS at 21:47

## 2019-05-28 VITALS
RESPIRATION RATE: 16 BRPM | BODY MASS INDEX: 31.17 KG/M2 | SYSTOLIC BLOOD PRESSURE: 149 MMHG | TEMPERATURE: 98 F | OXYGEN SATURATION: 93 % | WEIGHT: 159.61 LBS | HEART RATE: 63 BPM | DIASTOLIC BLOOD PRESSURE: 72 MMHG

## 2019-05-28 LAB
BACTERIA UR QL AUTO: ABNORMAL /HPF
NON-SQ EPI CELLS URNS QL MICRO: ABNORMAL /HPF
RBC #/AREA URNS AUTO: ABNORMAL /HPF
WBC #/AREA URNS AUTO: ABNORMAL /HPF

## 2019-05-28 RX ORDER — CEPHALEXIN 250 MG/1
500 CAPSULE ORAL EVERY 6 HOURS SCHEDULED
Qty: 28 CAPSULE | Refills: 0 | Status: SHIPPED | OUTPATIENT
Start: 2019-05-28 | End: 2019-06-04

## 2019-05-29 LAB
BACTERIA UR CULT: ABNORMAL
BACTERIA UR CULT: ABNORMAL

## 2019-05-30 LAB
BACTERIA UR CULT: ABNORMAL
BACTERIA UR CULT: ABNORMAL

## 2020-06-08 ENCOUNTER — HOSPITAL ENCOUNTER (INPATIENT)
Facility: HOSPITAL | Age: 85
LOS: 9 days | Discharge: HOME HEALTH | DRG: 690 | End: 2020-06-17
Attending: EMERGENCY MEDICINE | Admitting: INTERNAL MEDICINE
Payer: MEDICARE

## 2020-06-08 ENCOUNTER — APPOINTMENT (EMERGENCY)
Dept: RADIOLOGY | Facility: HOSPITAL | Age: 85
DRG: 690 | End: 2020-06-08
Payer: MEDICARE

## 2020-06-08 DIAGNOSIS — N17.9 AKI (ACUTE KIDNEY INJURY) (HCC): ICD-10-CM

## 2020-06-08 DIAGNOSIS — Z86.718 HISTORY OF DVT (DEEP VEIN THROMBOSIS): ICD-10-CM

## 2020-06-08 DIAGNOSIS — R53.1 WEAKNESS: ICD-10-CM

## 2020-06-08 DIAGNOSIS — E87.5 HYPERKALEMIA: ICD-10-CM

## 2020-06-08 DIAGNOSIS — R09.02 HYPOXIA: ICD-10-CM

## 2020-06-08 DIAGNOSIS — R53.1 GENERALIZED WEAKNESS: ICD-10-CM

## 2020-06-08 DIAGNOSIS — R31.9 HEMATURIA: ICD-10-CM

## 2020-06-08 DIAGNOSIS — N39.0 UTI (URINARY TRACT INFECTION): Primary | ICD-10-CM

## 2020-06-08 DIAGNOSIS — R23.8 SKIN IRRITATION: ICD-10-CM

## 2020-06-08 DIAGNOSIS — R79.1 SUPRATHERAPEUTIC INR: ICD-10-CM

## 2020-06-08 DIAGNOSIS — D64.9 ANEMIA: ICD-10-CM

## 2020-06-08 DIAGNOSIS — E87.1 HYPONATREMIA: ICD-10-CM

## 2020-06-08 LAB
ALBUMIN SERPL BCP-MCNC: 2.9 G/DL (ref 3.5–5)
ALP SERPL-CCNC: 123 U/L (ref 46–116)
ALT SERPL W P-5'-P-CCNC: 20 U/L (ref 12–78)
ANION GAP SERPL CALCULATED.3IONS-SCNC: 11 MMOL/L (ref 4–13)
APTT PPP: 110 SECONDS (ref 23–37)
AST SERPL W P-5'-P-CCNC: 26 U/L (ref 5–45)
BACTERIA UR QL AUTO: ABNORMAL /HPF
BASOPHILS # BLD AUTO: 0.03 THOUSANDS/ΜL (ref 0–0.1)
BASOPHILS NFR BLD AUTO: 0 % (ref 0–1)
BILIRUB SERPL-MCNC: 0.9 MG/DL (ref 0.2–1)
BILIRUB UR QL STRIP: ABNORMAL
BUN SERPL-MCNC: 44 MG/DL (ref 5–25)
CALCIUM SERPL-MCNC: 9.2 MG/DL (ref 8.3–10.1)
CHLORIDE SERPL-SCNC: 99 MMOL/L (ref 100–108)
CLARITY UR: ABNORMAL
CO2 SERPL-SCNC: 22 MMOL/L (ref 21–32)
COLOR UR: ABNORMAL
CREAT SERPL-MCNC: 1.55 MG/DL (ref 0.6–1.3)
EOSINOPHIL # BLD AUTO: 0.07 THOUSAND/ΜL (ref 0–0.61)
EOSINOPHIL NFR BLD AUTO: 1 % (ref 0–6)
ERYTHROCYTE [DISTWIDTH] IN BLOOD BY AUTOMATED COUNT: 13.7 % (ref 11.6–15.1)
GFR SERPL CREATININE-BSD FRML MDRD: 29 ML/MIN/1.73SQ M
GLUCOSE SERPL-MCNC: 125 MG/DL (ref 65–140)
GLUCOSE UR STRIP-MCNC: NEGATIVE MG/DL
HCT VFR BLD AUTO: 30.8 % (ref 34.8–46.1)
HGB BLD-MCNC: 10 G/DL (ref 11.5–15.4)
HGB UR QL STRIP.AUTO: ABNORMAL
IMM GRANULOCYTES # BLD AUTO: 0.1 THOUSAND/UL (ref 0–0.2)
IMM GRANULOCYTES NFR BLD AUTO: 1 % (ref 0–2)
INR PPP: 8.25 (ref 0.84–1.19)
KETONES UR STRIP-MCNC: NEGATIVE MG/DL
LEUKOCYTE ESTERASE UR QL STRIP: ABNORMAL
LYMPHOCYTES # BLD AUTO: 0.67 THOUSANDS/ΜL (ref 0.6–4.47)
LYMPHOCYTES NFR BLD AUTO: 5 % (ref 14–44)
MCH RBC QN AUTO: 30.5 PG (ref 26.8–34.3)
MCHC RBC AUTO-ENTMCNC: 32.5 G/DL (ref 31.4–37.4)
MCV RBC AUTO: 94 FL (ref 82–98)
MONOCYTES # BLD AUTO: 0.76 THOUSAND/ΜL (ref 0.17–1.22)
MONOCYTES NFR BLD AUTO: 6 % (ref 4–12)
NEUTROPHILS # BLD AUTO: 12.14 THOUSANDS/ΜL (ref 1.85–7.62)
NEUTS SEG NFR BLD AUTO: 87 % (ref 43–75)
NITRITE UR QL STRIP: NEGATIVE
NON-SQ EPI CELLS URNS QL MICRO: ABNORMAL /HPF
NRBC BLD AUTO-RTO: 0 /100 WBCS
PH UR STRIP.AUTO: 7.5 [PH]
PLATELET # BLD AUTO: 181 THOUSANDS/UL (ref 149–390)
PMV BLD AUTO: 9.5 FL (ref 8.9–12.7)
POTASSIUM SERPL-SCNC: 4.4 MMOL/L (ref 3.5–5.3)
PROT SERPL-MCNC: 7.3 G/DL (ref 6.4–8.2)
PROT UR STRIP-MCNC: ABNORMAL MG/DL
PROTHROMBIN TIME: 70.5 SECONDS (ref 11.6–14.5)
RBC # BLD AUTO: 3.28 MILLION/UL (ref 3.81–5.12)
RBC #/AREA URNS AUTO: ABNORMAL /HPF
SARS-COV-2 RNA RESP QL NAA+PROBE: NEGATIVE
SODIUM SERPL-SCNC: 132 MMOL/L (ref 136–145)
SP GR UR STRIP.AUTO: 1.02 (ref 1–1.03)
TROPONIN I SERPL-MCNC: <0.02 NG/ML
UROBILINOGEN UR QL STRIP.AUTO: 0.2 E.U./DL
WBC # BLD AUTO: 13.77 THOUSAND/UL (ref 4.31–10.16)
WBC #/AREA URNS AUTO: ABNORMAL /HPF

## 2020-06-08 PROCEDURE — 84484 ASSAY OF TROPONIN QUANT: CPT | Performed by: EMERGENCY MEDICINE

## 2020-06-08 PROCEDURE — 87040 BLOOD CULTURE FOR BACTERIA: CPT | Performed by: EMERGENCY MEDICINE

## 2020-06-08 PROCEDURE — 36415 COLL VENOUS BLD VENIPUNCTURE: CPT | Performed by: EMERGENCY MEDICINE

## 2020-06-08 PROCEDURE — 99285 EMERGENCY DEPT VISIT HI MDM: CPT

## 2020-06-08 PROCEDURE — 87635 SARS-COV-2 COVID-19 AMP PRB: CPT | Performed by: EMERGENCY MEDICINE

## 2020-06-08 PROCEDURE — 85730 THROMBOPLASTIN TIME PARTIAL: CPT | Performed by: EMERGENCY MEDICINE

## 2020-06-08 PROCEDURE — 85610 PROTHROMBIN TIME: CPT | Performed by: EMERGENCY MEDICINE

## 2020-06-08 PROCEDURE — 87086 URINE CULTURE/COLONY COUNT: CPT | Performed by: EMERGENCY MEDICINE

## 2020-06-08 PROCEDURE — 93005 ELECTROCARDIOGRAM TRACING: CPT

## 2020-06-08 PROCEDURE — 80053 COMPREHEN METABOLIC PANEL: CPT | Performed by: EMERGENCY MEDICINE

## 2020-06-08 PROCEDURE — 96361 HYDRATE IV INFUSION ADD-ON: CPT

## 2020-06-08 PROCEDURE — 81001 URINALYSIS AUTO W/SCOPE: CPT | Performed by: EMERGENCY MEDICINE

## 2020-06-08 PROCEDURE — 85025 COMPLETE CBC W/AUTO DIFF WBC: CPT | Performed by: EMERGENCY MEDICINE

## 2020-06-08 PROCEDURE — 71045 X-RAY EXAM CHEST 1 VIEW: CPT

## 2020-06-08 PROCEDURE — 96374 THER/PROPH/DIAG INJ IV PUSH: CPT

## 2020-06-08 PROCEDURE — 99285 EMERGENCY DEPT VISIT HI MDM: CPT | Performed by: EMERGENCY MEDICINE

## 2020-06-08 RX ORDER — OMEPRAZOLE 20 MG/1
20 CAPSULE, DELAYED RELEASE ORAL DAILY
COMMUNITY

## 2020-06-08 RX ORDER — SODIUM CHLORIDE 9 MG/ML
1000 INJECTION, SOLUTION INTRAVENOUS ONCE
Status: COMPLETED | OUTPATIENT
Start: 2020-06-08 | End: 2020-06-08

## 2020-06-08 RX ORDER — ASCORBIC ACID 500 MG
500 TABLET ORAL 2 TIMES DAILY
COMMUNITY

## 2020-06-08 RX ORDER — METHENAMINE HIPPURATE 1000 MG/1
1 TABLET ORAL 2 TIMES DAILY WITH MEALS
COMMUNITY

## 2020-06-08 RX ORDER — POTASSIUM CHLORIDE 750 MG/1
20 CAPSULE, EXTENDED RELEASE ORAL 2 TIMES DAILY
COMMUNITY

## 2020-06-08 RX ADMIN — SODIUM CHLORIDE 1000 ML/HR: 0.9 INJECTION, SOLUTION INTRAVENOUS at 18:58

## 2020-06-08 RX ADMIN — CEFTRIAXONE SODIUM 1000 MG: 10 INJECTION, POWDER, FOR SOLUTION INTRAVENOUS at 20:25

## 2020-06-09 PROBLEM — N18.30 BENIGN HYPERTENSION WITH CHRONIC KIDNEY DISEASE, STAGE III (HCC): Status: ACTIVE | Noted: 2019-01-07

## 2020-06-09 PROBLEM — N18.30 CKD (CHRONIC KIDNEY DISEASE) STAGE 3, GFR 30-59 ML/MIN (HCC): Status: ACTIVE | Noted: 2020-06-09

## 2020-06-09 PROBLEM — R53.1 GENERALIZED WEAKNESS: Status: ACTIVE | Noted: 2020-06-09

## 2020-06-09 PROBLEM — N31.9 NEUROGENIC BLADDER: Status: ACTIVE | Noted: 2020-06-09

## 2020-06-09 PROBLEM — R09.02 HYPOXIA: Status: ACTIVE | Noted: 2020-06-09

## 2020-06-09 PROBLEM — E78.2 MIXED DYSLIPIDEMIA: Status: ACTIVE | Noted: 2018-01-12

## 2020-06-09 PROBLEM — N39.0 UTI (URINARY TRACT INFECTION): Status: ACTIVE | Noted: 2020-06-09

## 2020-06-09 PROBLEM — Z86.718 HISTORY OF DVT (DEEP VEIN THROMBOSIS): Status: ACTIVE | Noted: 2020-06-09

## 2020-06-09 PROBLEM — I12.9 BENIGN HYPERTENSION WITH CHRONIC KIDNEY DISEASE, STAGE III (HCC): Status: ACTIVE | Noted: 2019-01-07

## 2020-06-09 PROBLEM — S22.49XA RIB FRACTURES: Status: ACTIVE | Noted: 2020-06-09

## 2020-06-09 LAB
ABO GROUP BLD: NORMAL
ANION GAP SERPL CALCULATED.3IONS-SCNC: 10 MMOL/L (ref 4–13)
ATRIAL RATE: 85 BPM
BACTERIA UR CULT: NORMAL
BASE EX.OXY STD BLDV CALC-SCNC: 93.8 % (ref 60–80)
BASE EXCESS BLDV CALC-SCNC: -6.1 MMOL/L
BASOPHILS # BLD AUTO: 0.02 THOUSANDS/ΜL (ref 0–0.1)
BASOPHILS NFR BLD AUTO: 0 % (ref 0–1)
BLD GP AB SCN SERPL QL: NEGATIVE
BUN SERPL-MCNC: 41 MG/DL (ref 5–25)
CALCIUM SERPL-MCNC: 8.4 MG/DL (ref 8.3–10.1)
CHLORIDE SERPL-SCNC: 103 MMOL/L (ref 100–108)
CO2 SERPL-SCNC: 20 MMOL/L (ref 21–32)
CREAT SERPL-MCNC: 1.33 MG/DL (ref 0.6–1.3)
EOSINOPHIL # BLD AUTO: 0.39 THOUSAND/ΜL (ref 0–0.61)
EOSINOPHIL NFR BLD AUTO: 5 % (ref 0–6)
ERYTHROCYTE [DISTWIDTH] IN BLOOD BY AUTOMATED COUNT: 13.8 % (ref 11.6–15.1)
FERRITIN SERPL-MCNC: 517 NG/ML (ref 8–388)
FOLATE SERPL-MCNC: 3.7 NG/ML (ref 3.1–17.5)
GFR SERPL CREATININE-BSD FRML MDRD: 35 ML/MIN/1.73SQ M
GLUCOSE SERPL-MCNC: 102 MG/DL (ref 65–140)
HCO3 BLDV-SCNC: 18.5 MMOL/L (ref 24–30)
HCT VFR BLD AUTO: 25 % (ref 34.8–46.1)
HCT VFR BLD AUTO: 26.5 % (ref 34.8–46.1)
HGB BLD-MCNC: 8.1 G/DL (ref 11.5–15.4)
HGB BLD-MCNC: 8.4 G/DL (ref 11.5–15.4)
IMM GRANULOCYTES # BLD AUTO: 0.06 THOUSAND/UL (ref 0–0.2)
IMM GRANULOCYTES NFR BLD AUTO: 1 % (ref 0–2)
INR PPP: 2.48 (ref 0.84–1.19)
INR PPP: 9.51 (ref 0.84–1.19)
IRON SATN MFR SERPL: 28 %
IRON SERPL-MCNC: 41 UG/DL (ref 50–170)
LYMPHOCYTES # BLD AUTO: 1.18 THOUSANDS/ΜL (ref 0.6–4.47)
LYMPHOCYTES NFR BLD AUTO: 14 % (ref 14–44)
MCH RBC QN AUTO: 30.8 PG (ref 26.8–34.3)
MCHC RBC AUTO-ENTMCNC: 32.4 G/DL (ref 31.4–37.4)
MCV RBC AUTO: 95 FL (ref 82–98)
MONOCYTES # BLD AUTO: 0.66 THOUSAND/ΜL (ref 0.17–1.22)
MONOCYTES NFR BLD AUTO: 8 % (ref 4–12)
NEUTROPHILS # BLD AUTO: 6.35 THOUSANDS/ΜL (ref 1.85–7.62)
NEUTS SEG NFR BLD AUTO: 72 % (ref 43–75)
NRBC BLD AUTO-RTO: 0 /100 WBCS
O2 CT BLDV-SCNC: 12 ML/DL
P AXIS: 67 DEGREES
PCO2 BLDV: 32.8 MM HG (ref 42–50)
PH BLDV: 7.37 [PH] (ref 7.3–7.4)
PLATELET # BLD AUTO: 139 THOUSANDS/UL (ref 149–390)
PMV BLD AUTO: 9.7 FL (ref 8.9–12.7)
PO2 BLDV: 90.9 MM HG (ref 35–45)
POTASSIUM SERPL-SCNC: 4.1 MMOL/L (ref 3.5–5.3)
PR INTERVAL: 186 MS
PROCALCITONIN SERPL-MCNC: 0.31 NG/ML
PROCALCITONIN SERPL-MCNC: 0.35 NG/ML
PROTHROMBIN TIME: 27.2 SECONDS (ref 11.6–14.5)
PROTHROMBIN TIME: 78.9 SECONDS (ref 11.6–14.5)
QRS AXIS: -73 DEGREES
QRSD INTERVAL: 88 MS
QT INTERVAL: 378 MS
QTC INTERVAL: 449 MS
RBC # BLD AUTO: 2.63 MILLION/UL (ref 3.81–5.12)
RH BLD: POSITIVE
SODIUM SERPL-SCNC: 133 MMOL/L (ref 136–145)
SPECIMEN EXPIRATION DATE: NORMAL
T WAVE AXIS: 76 DEGREES
TIBC SERPL-MCNC: 149 UG/DL (ref 250–450)
VENTRICULAR RATE: 85 BPM
VIT B12 SERPL-MCNC: 381 PG/ML (ref 100–900)
WBC # BLD AUTO: 8.66 THOUSAND/UL (ref 4.31–10.16)

## 2020-06-09 PROCEDURE — 36415 COLL VENOUS BLD VENIPUNCTURE: CPT | Performed by: PHYSICIAN ASSISTANT

## 2020-06-09 PROCEDURE — 80048 BASIC METABOLIC PNL TOTAL CA: CPT | Performed by: PHYSICIAN ASSISTANT

## 2020-06-09 PROCEDURE — 85018 HEMOGLOBIN: CPT | Performed by: FAMILY MEDICINE

## 2020-06-09 PROCEDURE — 82805 BLOOD GASES W/O2 SATURATION: CPT | Performed by: PHYSICIAN ASSISTANT

## 2020-06-09 PROCEDURE — 86900 BLOOD TYPING SEROLOGIC ABO: CPT | Performed by: PHYSICIAN ASSISTANT

## 2020-06-09 PROCEDURE — 99223 1ST HOSP IP/OBS HIGH 75: CPT | Performed by: PHYSICIAN ASSISTANT

## 2020-06-09 PROCEDURE — 85610 PROTHROMBIN TIME: CPT | Performed by: PHYSICIAN ASSISTANT

## 2020-06-09 PROCEDURE — 85025 COMPLETE CBC W/AUTO DIFF WBC: CPT | Performed by: PHYSICIAN ASSISTANT

## 2020-06-09 PROCEDURE — 83540 ASSAY OF IRON: CPT | Performed by: FAMILY MEDICINE

## 2020-06-09 PROCEDURE — 86901 BLOOD TYPING SEROLOGIC RH(D): CPT | Performed by: PHYSICIAN ASSISTANT

## 2020-06-09 PROCEDURE — 82607 VITAMIN B-12: CPT | Performed by: FAMILY MEDICINE

## 2020-06-09 PROCEDURE — 82728 ASSAY OF FERRITIN: CPT | Performed by: FAMILY MEDICINE

## 2020-06-09 PROCEDURE — 84145 PROCALCITONIN (PCT): CPT | Performed by: PHYSICIAN ASSISTANT

## 2020-06-09 PROCEDURE — 85610 PROTHROMBIN TIME: CPT | Performed by: FAMILY MEDICINE

## 2020-06-09 PROCEDURE — 86850 RBC ANTIBODY SCREEN: CPT | Performed by: PHYSICIAN ASSISTANT

## 2020-06-09 PROCEDURE — 83550 IRON BINDING TEST: CPT | Performed by: FAMILY MEDICINE

## 2020-06-09 PROCEDURE — 82746 ASSAY OF FOLIC ACID SERUM: CPT | Performed by: FAMILY MEDICINE

## 2020-06-09 PROCEDURE — 85014 HEMATOCRIT: CPT | Performed by: FAMILY MEDICINE

## 2020-06-09 PROCEDURE — 93010 ELECTROCARDIOGRAM REPORT: CPT | Performed by: INTERNAL MEDICINE

## 2020-06-09 PROCEDURE — 99222 1ST HOSP IP/OBS MODERATE 55: CPT | Performed by: PHYSICIAN ASSISTANT

## 2020-06-09 RX ORDER — LANOLIN ALCOHOL/MO/W.PET/CERES
3 CREAM (GRAM) TOPICAL
Status: DISCONTINUED | OUTPATIENT
Start: 2020-06-09 | End: 2020-06-17 | Stop reason: HOSPADM

## 2020-06-09 RX ORDER — ASCORBIC ACID 500 MG
500 TABLET ORAL DAILY
Status: DISCONTINUED | OUTPATIENT
Start: 2020-06-09 | End: 2020-06-17 | Stop reason: HOSPADM

## 2020-06-09 RX ORDER — ACETAMINOPHEN 325 MG/1
650 TABLET ORAL EVERY 6 HOURS PRN
Status: DISCONTINUED | OUTPATIENT
Start: 2020-06-09 | End: 2020-06-17 | Stop reason: HOSPADM

## 2020-06-09 RX ORDER — DOCUSATE SODIUM 100 MG/1
100 CAPSULE, LIQUID FILLED ORAL 2 TIMES DAILY
Status: DISCONTINUED | OUTPATIENT
Start: 2020-06-09 | End: 2020-06-17 | Stop reason: HOSPADM

## 2020-06-09 RX ORDER — SODIUM CHLORIDE 9 MG/ML
75 INJECTION, SOLUTION INTRAVENOUS CONTINUOUS
Status: DISCONTINUED | OUTPATIENT
Start: 2020-06-09 | End: 2020-06-09

## 2020-06-09 RX ORDER — FLUTICASONE PROPIONATE 50 MCG
1 SPRAY, SUSPENSION (ML) NASAL DAILY PRN
Status: DISCONTINUED | OUTPATIENT
Start: 2020-06-09 | End: 2020-06-17 | Stop reason: HOSPADM

## 2020-06-09 RX ORDER — WARFARIN SODIUM 5 MG/1
5 TABLET ORAL
Status: DISCONTINUED | OUTPATIENT
Start: 2020-06-09 | End: 2020-06-10

## 2020-06-09 RX ORDER — ONDANSETRON 2 MG/ML
4 INJECTION INTRAMUSCULAR; INTRAVENOUS EVERY 6 HOURS PRN
Status: DISCONTINUED | OUTPATIENT
Start: 2020-06-09 | End: 2020-06-17 | Stop reason: HOSPADM

## 2020-06-09 RX ORDER — POTASSIUM CHLORIDE 20 MEQ/1
20 TABLET, EXTENDED RELEASE ORAL 2 TIMES DAILY
Status: DISCONTINUED | OUTPATIENT
Start: 2020-06-09 | End: 2020-06-12

## 2020-06-09 RX ORDER — ALBUTEROL SULFATE 90 UG/1
2 AEROSOL, METERED RESPIRATORY (INHALATION) EVERY 4 HOURS PRN
Status: DISCONTINUED | OUTPATIENT
Start: 2020-06-09 | End: 2020-06-17 | Stop reason: HOSPADM

## 2020-06-09 RX ORDER — PANTOPRAZOLE SODIUM 40 MG/1
40 TABLET, DELAYED RELEASE ORAL
Status: DISCONTINUED | OUTPATIENT
Start: 2020-06-09 | End: 2020-06-17 | Stop reason: HOSPADM

## 2020-06-09 RX ADMIN — Medication 500 MG: at 08:59

## 2020-06-09 RX ADMIN — POTASSIUM CHLORIDE 20 MEQ: 1500 TABLET, EXTENDED RELEASE ORAL at 08:59

## 2020-06-09 RX ADMIN — PHYTONADIONE 5 MG: 10 INJECTION, EMULSION INTRAMUSCULAR; INTRAVENOUS; SUBCUTANEOUS at 08:59

## 2020-06-09 RX ADMIN — CEFTRIAXONE SODIUM 1000 MG: 10 INJECTION, POWDER, FOR SOLUTION INTRAVENOUS at 19:46

## 2020-06-09 RX ADMIN — MELATONIN 3 MG: 3 TAB ORAL at 21:47

## 2020-06-09 RX ADMIN — POTASSIUM CHLORIDE 20 MEQ: 1500 TABLET, EXTENDED RELEASE ORAL at 17:30

## 2020-06-09 RX ADMIN — MELATONIN 3 MG: 3 TAB ORAL at 00:50

## 2020-06-09 RX ADMIN — DOCUSATE SODIUM 100 MG: 100 CAPSULE, LIQUID FILLED ORAL at 08:59

## 2020-06-09 RX ADMIN — SODIUM CHLORIDE 75 ML/HR: 0.9 INJECTION, SOLUTION INTRAVENOUS at 05:29

## 2020-06-09 RX ADMIN — WARFARIN SODIUM 5 MG: 5 TABLET ORAL at 17:30

## 2020-06-09 RX ADMIN — PANTOPRAZOLE SODIUM 40 MG: 40 TABLET, DELAYED RELEASE ORAL at 05:26

## 2020-06-10 ENCOUNTER — TELEPHONE (OUTPATIENT)
Dept: OTHER | Facility: HOSPITAL | Age: 85
End: 2020-06-10

## 2020-06-10 PROBLEM — D64.9 ANEMIA: Status: ACTIVE | Noted: 2020-06-10

## 2020-06-10 LAB
ANION GAP SERPL CALCULATED.3IONS-SCNC: 9 MMOL/L (ref 4–13)
BASOPHILS # BLD AUTO: 0.05 THOUSANDS/ΜL (ref 0–0.1)
BASOPHILS NFR BLD AUTO: 1 % (ref 0–1)
BUN SERPL-MCNC: 35 MG/DL (ref 5–25)
CALCIUM SERPL-MCNC: 8.6 MG/DL (ref 8.3–10.1)
CHLORIDE SERPL-SCNC: 105 MMOL/L (ref 100–108)
CO2 SERPL-SCNC: 21 MMOL/L (ref 21–32)
CREAT SERPL-MCNC: 1.18 MG/DL (ref 0.6–1.3)
EOSINOPHIL # BLD AUTO: 0.57 THOUSAND/ΜL (ref 0–0.61)
EOSINOPHIL NFR BLD AUTO: 6 % (ref 0–6)
ERYTHROCYTE [DISTWIDTH] IN BLOOD BY AUTOMATED COUNT: 14.1 % (ref 11.6–15.1)
GFR SERPL CREATININE-BSD FRML MDRD: 41 ML/MIN/1.73SQ M
GLUCOSE SERPL-MCNC: 111 MG/DL (ref 65–140)
HCT VFR BLD AUTO: 26.4 % (ref 34.8–46.1)
HGB BLD-MCNC: 8.2 G/DL (ref 11.5–15.4)
IMM GRANULOCYTES # BLD AUTO: 0.16 THOUSAND/UL (ref 0–0.2)
IMM GRANULOCYTES NFR BLD AUTO: 2 % (ref 0–2)
INR PPP: 1.46 (ref 0.84–1.19)
LYMPHOCYTES # BLD AUTO: 1.35 THOUSANDS/ΜL (ref 0.6–4.47)
LYMPHOCYTES NFR BLD AUTO: 15 % (ref 14–44)
MCH RBC QN AUTO: 30.1 PG (ref 26.8–34.3)
MCHC RBC AUTO-ENTMCNC: 31.1 G/DL (ref 31.4–37.4)
MCV RBC AUTO: 97 FL (ref 82–98)
MONOCYTES # BLD AUTO: 0.7 THOUSAND/ΜL (ref 0.17–1.22)
MONOCYTES NFR BLD AUTO: 8 % (ref 4–12)
NEUTROPHILS # BLD AUTO: 6.3 THOUSANDS/ΜL (ref 1.85–7.62)
NEUTS SEG NFR BLD AUTO: 68 % (ref 43–75)
NRBC BLD AUTO-RTO: 0 /100 WBCS
PLATELET # BLD AUTO: 147 THOUSANDS/UL (ref 149–390)
PMV BLD AUTO: 9.7 FL (ref 8.9–12.7)
POTASSIUM SERPL-SCNC: 4.7 MMOL/L (ref 3.5–5.3)
PROTHROMBIN TIME: 17.8 SECONDS (ref 11.6–14.5)
RBC # BLD AUTO: 2.72 MILLION/UL (ref 3.81–5.12)
SODIUM SERPL-SCNC: 135 MMOL/L (ref 136–145)
WBC # BLD AUTO: 9.13 THOUSAND/UL (ref 4.31–10.16)

## 2020-06-10 PROCEDURE — 80048 BASIC METABOLIC PNL TOTAL CA: CPT | Performed by: FAMILY MEDICINE

## 2020-06-10 PROCEDURE — 99223 1ST HOSP IP/OBS HIGH 75: CPT | Performed by: NURSE PRACTITIONER

## 2020-06-10 PROCEDURE — 97167 OT EVAL HIGH COMPLEX 60 MIN: CPT

## 2020-06-10 PROCEDURE — 85025 COMPLETE CBC W/AUTO DIFF WBC: CPT | Performed by: FAMILY MEDICINE

## 2020-06-10 PROCEDURE — 97163 PT EVAL HIGH COMPLEX 45 MIN: CPT

## 2020-06-10 PROCEDURE — 85610 PROTHROMBIN TIME: CPT | Performed by: PHYSICIAN ASSISTANT

## 2020-06-10 PROCEDURE — 99232 SBSQ HOSP IP/OBS MODERATE 35: CPT | Performed by: FAMILY MEDICINE

## 2020-06-10 PROCEDURE — 99232 SBSQ HOSP IP/OBS MODERATE 35: CPT | Performed by: PHYSICIAN ASSISTANT

## 2020-06-10 RX ORDER — WARFARIN SODIUM 7.5 MG/1
7.5 TABLET ORAL
Status: DISCONTINUED | OUTPATIENT
Start: 2020-06-10 | End: 2020-06-11

## 2020-06-10 RX ADMIN — POTASSIUM CHLORIDE 20 MEQ: 1500 TABLET, EXTENDED RELEASE ORAL at 08:23

## 2020-06-10 RX ADMIN — CEFTRIAXONE SODIUM 1000 MG: 10 INJECTION, POWDER, FOR SOLUTION INTRAVENOUS at 21:16

## 2020-06-10 RX ADMIN — MELATONIN 3 MG: 3 TAB ORAL at 21:17

## 2020-06-10 RX ADMIN — WARFARIN SODIUM 7.5 MG: 7.5 TABLET ORAL at 18:00

## 2020-06-10 RX ADMIN — PANTOPRAZOLE SODIUM 40 MG: 40 TABLET, DELAYED RELEASE ORAL at 05:53

## 2020-06-10 RX ADMIN — POTASSIUM CHLORIDE 20 MEQ: 1500 TABLET, EXTENDED RELEASE ORAL at 18:00

## 2020-06-10 RX ADMIN — Medication 500 MG: at 08:23

## 2020-06-11 PROBLEM — R79.1 SUBTHERAPEUTIC INTERNATIONAL NORMALIZED RATIO (INR): Status: ACTIVE | Noted: 2020-06-11

## 2020-06-11 LAB
ANION GAP SERPL CALCULATED.3IONS-SCNC: 8 MMOL/L (ref 4–13)
APTT PPP: 51 SECONDS (ref 23–37)
APTT PPP: 65 SECONDS (ref 23–37)
BASOPHILS # BLD AUTO: 0.06 THOUSANDS/ΜL (ref 0–0.1)
BASOPHILS NFR BLD AUTO: 1 % (ref 0–1)
BUN SERPL-MCNC: 29 MG/DL (ref 5–25)
CALCIUM SERPL-MCNC: 8.5 MG/DL (ref 8.3–10.1)
CHLORIDE SERPL-SCNC: 99 MMOL/L (ref 100–108)
CO2 SERPL-SCNC: 22 MMOL/L (ref 21–32)
CREAT SERPL-MCNC: 1.25 MG/DL (ref 0.6–1.3)
EOSINOPHIL # BLD AUTO: 0.54 THOUSAND/ΜL (ref 0–0.61)
EOSINOPHIL NFR BLD AUTO: 5 % (ref 0–6)
ERYTHROCYTE [DISTWIDTH] IN BLOOD BY AUTOMATED COUNT: 14.1 % (ref 11.6–15.1)
GFR SERPL CREATININE-BSD FRML MDRD: 38 ML/MIN/1.73SQ M
GLUCOSE SERPL-MCNC: 104 MG/DL (ref 65–140)
HCT VFR BLD AUTO: 29.8 % (ref 34.8–46.1)
HGB BLD-MCNC: 8.9 G/DL (ref 11.5–15.4)
IMM GRANULOCYTES # BLD AUTO: 0.15 THOUSAND/UL (ref 0–0.2)
IMM GRANULOCYTES NFR BLD AUTO: 1 % (ref 0–2)
INR PPP: 1.57 (ref 0.84–1.19)
INR PPP: 1.6 (ref 0.84–1.19)
LYMPHOCYTES # BLD AUTO: 1.46 THOUSANDS/ΜL (ref 0.6–4.47)
LYMPHOCYTES NFR BLD AUTO: 13 % (ref 14–44)
MCH RBC QN AUTO: 30.6 PG (ref 26.8–34.3)
MCHC RBC AUTO-ENTMCNC: 29.9 G/DL (ref 31.4–37.4)
MCV RBC AUTO: 102 FL (ref 82–98)
MONOCYTES # BLD AUTO: 0.73 THOUSAND/ΜL (ref 0.17–1.22)
MONOCYTES NFR BLD AUTO: 6 % (ref 4–12)
NEUTROPHILS # BLD AUTO: 8.56 THOUSANDS/ΜL (ref 1.85–7.62)
NEUTS SEG NFR BLD AUTO: 74 % (ref 43–75)
NRBC BLD AUTO-RTO: 0 /100 WBCS
PLATELET # BLD AUTO: 135 THOUSANDS/UL (ref 149–390)
PMV BLD AUTO: 9.7 FL (ref 8.9–12.7)
POTASSIUM SERPL-SCNC: 5.2 MMOL/L (ref 3.5–5.3)
PROTHROMBIN TIME: 18.9 SECONDS (ref 11.6–14.5)
PROTHROMBIN TIME: 19.1 SECONDS (ref 11.6–14.5)
RBC # BLD AUTO: 2.91 MILLION/UL (ref 3.81–5.12)
SODIUM SERPL-SCNC: 129 MMOL/L (ref 136–145)
WBC # BLD AUTO: 11.5 THOUSAND/UL (ref 4.31–10.16)

## 2020-06-11 PROCEDURE — 85610 PROTHROMBIN TIME: CPT | Performed by: FAMILY MEDICINE

## 2020-06-11 PROCEDURE — 80048 BASIC METABOLIC PNL TOTAL CA: CPT | Performed by: FAMILY MEDICINE

## 2020-06-11 PROCEDURE — 99232 SBSQ HOSP IP/OBS MODERATE 35: CPT | Performed by: INTERNAL MEDICINE

## 2020-06-11 PROCEDURE — 85730 THROMBOPLASTIN TIME PARTIAL: CPT | Performed by: FAMILY MEDICINE

## 2020-06-11 PROCEDURE — 85610 PROTHROMBIN TIME: CPT | Performed by: PHYSICIAN ASSISTANT

## 2020-06-11 PROCEDURE — 99232 SBSQ HOSP IP/OBS MODERATE 35: CPT | Performed by: FAMILY MEDICINE

## 2020-06-11 PROCEDURE — 85025 COMPLETE CBC W/AUTO DIFF WBC: CPT | Performed by: FAMILY MEDICINE

## 2020-06-11 RX ORDER — FERROUS SULFATE 325(65) MG
325 TABLET ORAL
Status: DISCONTINUED | OUTPATIENT
Start: 2020-06-12 | End: 2020-06-17 | Stop reason: HOSPADM

## 2020-06-11 RX ORDER — HEPARIN SODIUM 1000 [USP'U]/ML
4400 INJECTION, SOLUTION INTRAVENOUS; SUBCUTANEOUS ONCE
Status: COMPLETED | OUTPATIENT
Start: 2020-06-11 | End: 2020-06-11

## 2020-06-11 RX ORDER — HEPARIN SODIUM 10000 [USP'U]/100ML
3-30 INJECTION, SOLUTION INTRAVENOUS
Status: DISCONTINUED | OUTPATIENT
Start: 2020-06-11 | End: 2020-06-13

## 2020-06-11 RX ORDER — HEPARIN SODIUM 1000 [USP'U]/ML
4400 INJECTION, SOLUTION INTRAVENOUS; SUBCUTANEOUS
Status: DISCONTINUED | OUTPATIENT
Start: 2020-06-11 | End: 2020-06-13

## 2020-06-11 RX ORDER — HEPARIN SODIUM 1000 [USP'U]/ML
2200 INJECTION, SOLUTION INTRAVENOUS; SUBCUTANEOUS
Status: DISCONTINUED | OUTPATIENT
Start: 2020-06-11 | End: 2020-06-13

## 2020-06-11 RX ADMIN — DOCUSATE SODIUM 100 MG: 100 CAPSULE, LIQUID FILLED ORAL at 17:59

## 2020-06-11 RX ADMIN — POTASSIUM CHLORIDE 20 MEQ: 1500 TABLET, EXTENDED RELEASE ORAL at 17:59

## 2020-06-11 RX ADMIN — HEPARIN SODIUM 4400 UNITS: 1000 INJECTION INTRAVENOUS; SUBCUTANEOUS at 11:50

## 2020-06-11 RX ADMIN — MELATONIN 3 MG: 3 TAB ORAL at 21:26

## 2020-06-11 RX ADMIN — DOCUSATE SODIUM 100 MG: 100 CAPSULE, LIQUID FILLED ORAL at 08:16

## 2020-06-11 RX ADMIN — CEFTRIAXONE SODIUM 1000 MG: 10 INJECTION, POWDER, FOR SOLUTION INTRAVENOUS at 20:30

## 2020-06-11 RX ADMIN — WARFARIN SODIUM 8.5 MG: 3 TABLET ORAL at 17:59

## 2020-06-11 RX ADMIN — PANTOPRAZOLE SODIUM 40 MG: 40 TABLET, DELAYED RELEASE ORAL at 06:08

## 2020-06-11 RX ADMIN — POTASSIUM CHLORIDE 20 MEQ: 1500 TABLET, EXTENDED RELEASE ORAL at 08:16

## 2020-06-11 RX ADMIN — Medication 500 MG: at 08:16

## 2020-06-11 RX ADMIN — HEPARIN SODIUM AND DEXTROSE 18 UNITS/KG/HR: 10000; 5 INJECTION INTRAVENOUS at 11:51

## 2020-06-12 PROBLEM — E87.5 HYPERKALEMIA: Status: ACTIVE | Noted: 2020-06-12

## 2020-06-12 PROBLEM — N17.9 AKI (ACUTE KIDNEY INJURY) (HCC): Status: ACTIVE | Noted: 2020-06-12

## 2020-06-12 PROBLEM — R31.9 HEMATURIA: Status: ACTIVE | Noted: 2020-06-12

## 2020-06-12 PROBLEM — E87.1 HYPONATREMIA: Status: ACTIVE | Noted: 2020-06-12

## 2020-06-12 LAB
ANION GAP SERPL CALCULATED.3IONS-SCNC: 9 MMOL/L (ref 4–13)
APTT PPP: 206 SECONDS (ref 23–37)
APTT PPP: 59 SECONDS (ref 23–37)
APTT PPP: 88 SECONDS (ref 23–37)
APTT PPP: >210 SECONDS (ref 23–37)
BUN SERPL-MCNC: 26 MG/DL (ref 5–25)
CALCIUM SERPL-MCNC: 8.4 MG/DL (ref 8.3–10.1)
CHLORIDE SERPL-SCNC: 96 MMOL/L (ref 100–108)
CO2 SERPL-SCNC: 22 MMOL/L (ref 21–32)
CREAT SERPL-MCNC: 1.31 MG/DL (ref 0.6–1.3)
GFR SERPL CREATININE-BSD FRML MDRD: 36 ML/MIN/1.73SQ M
GLUCOSE SERPL-MCNC: 126 MG/DL (ref 65–140)
HCT VFR BLD AUTO: 25.6 % (ref 34.8–46.1)
HGB BLD-MCNC: 8.2 G/DL (ref 11.5–15.4)
INR PPP: 1.81 (ref 0.84–1.19)
POTASSIUM SERPL-SCNC: 5.4 MMOL/L (ref 3.5–5.3)
PROTHROMBIN TIME: 21.1 SECONDS (ref 11.6–14.5)
SODIUM SERPL-SCNC: 127 MMOL/L (ref 136–145)

## 2020-06-12 PROCEDURE — 80048 BASIC METABOLIC PNL TOTAL CA: CPT | Performed by: FAMILY MEDICINE

## 2020-06-12 PROCEDURE — 85610 PROTHROMBIN TIME: CPT | Performed by: FAMILY MEDICINE

## 2020-06-12 PROCEDURE — 85018 HEMOGLOBIN: CPT | Performed by: FAMILY MEDICINE

## 2020-06-12 PROCEDURE — 99232 SBSQ HOSP IP/OBS MODERATE 35: CPT | Performed by: FAMILY MEDICINE

## 2020-06-12 PROCEDURE — 85014 HEMATOCRIT: CPT | Performed by: FAMILY MEDICINE

## 2020-06-12 PROCEDURE — 85730 THROMBOPLASTIN TIME PARTIAL: CPT | Performed by: FAMILY MEDICINE

## 2020-06-12 RX ORDER — SODIUM CHLORIDE 9 MG/ML
100 INJECTION, SOLUTION INTRAVENOUS CONTINUOUS
Status: DISCONTINUED | OUTPATIENT
Start: 2020-06-12 | End: 2020-06-13

## 2020-06-12 RX ADMIN — WARFARIN SODIUM 8.5 MG: 3 TABLET ORAL at 17:24

## 2020-06-12 RX ADMIN — FERROUS SULFATE TAB 325 MG (65 MG ELEMENTAL FE) 325 MG: 325 (65 FE) TAB at 08:54

## 2020-06-12 RX ADMIN — HEPARIN SODIUM 2200 UNITS: 1000 INJECTION INTRAVENOUS; SUBCUTANEOUS at 02:36

## 2020-06-12 RX ADMIN — POTASSIUM CHLORIDE 20 MEQ: 1500 TABLET, EXTENDED RELEASE ORAL at 08:54

## 2020-06-12 RX ADMIN — PANTOPRAZOLE SODIUM 40 MG: 40 TABLET, DELAYED RELEASE ORAL at 05:10

## 2020-06-12 RX ADMIN — SODIUM CHLORIDE 100 ML/HR: 0.9 INJECTION, SOLUTION INTRAVENOUS at 16:32

## 2020-06-12 RX ADMIN — HEPARIN SODIUM AND DEXTROSE 20 UNITS/KG/HR: 10000; 5 INJECTION INTRAVENOUS at 14:51

## 2020-06-12 RX ADMIN — MELATONIN 3 MG: 3 TAB ORAL at 21:05

## 2020-06-12 RX ADMIN — Medication 500 MG: at 08:54

## 2020-06-13 ENCOUNTER — APPOINTMENT (INPATIENT)
Dept: RADIOLOGY | Facility: HOSPITAL | Age: 85
DRG: 690 | End: 2020-06-13
Payer: MEDICARE

## 2020-06-13 LAB
ABO GROUP BLD: NORMAL
ANION GAP SERPL CALCULATED.3IONS-SCNC: 8 MMOL/L (ref 4–13)
APTT PPP: 135 SECONDS (ref 23–37)
BACTERIA BLD CULT: NORMAL
BACTERIA BLD CULT: NORMAL
BASOPHILS # BLD AUTO: 0.06 THOUSANDS/ΜL (ref 0–0.1)
BASOPHILS NFR BLD AUTO: 1 % (ref 0–1)
BLD GP AB SCN SERPL QL: NEGATIVE
BUN SERPL-MCNC: 23 MG/DL (ref 5–25)
CALCIUM SERPL-MCNC: 8 MG/DL (ref 8.3–10.1)
CHLORIDE SERPL-SCNC: 97 MMOL/L (ref 100–108)
CHLORIDE UR-SCNC: 82 MMOL/L
CO2 SERPL-SCNC: 21 MMOL/L (ref 21–32)
CREAT SERPL-MCNC: 1.2 MG/DL (ref 0.6–1.3)
EOSINOPHIL # BLD AUTO: 0.8 THOUSAND/ΜL (ref 0–0.61)
EOSINOPHIL NFR BLD AUTO: 9 % (ref 0–6)
ERYTHROCYTE [DISTWIDTH] IN BLOOD BY AUTOMATED COUNT: 14.1 % (ref 11.6–15.1)
GFR SERPL CREATININE-BSD FRML MDRD: 40 ML/MIN/1.73SQ M
GLUCOSE SERPL-MCNC: 102 MG/DL (ref 65–140)
HCT VFR BLD AUTO: 22.2 % (ref 34.8–46.1)
HEMOCCULT STL QL: NEGATIVE
HGB BLD-MCNC: 6.8 G/DL (ref 11.5–15.4)
IMM GRANULOCYTES # BLD AUTO: 0.13 THOUSAND/UL (ref 0–0.2)
IMM GRANULOCYTES NFR BLD AUTO: 1 % (ref 0–2)
INR PPP: 2.27 (ref 0.84–1.19)
LYMPHOCYTES # BLD AUTO: 1.27 THOUSANDS/ΜL (ref 0.6–4.47)
LYMPHOCYTES NFR BLD AUTO: 14 % (ref 14–44)
MCH RBC QN AUTO: 30.6 PG (ref 26.8–34.3)
MCHC RBC AUTO-ENTMCNC: 30.6 G/DL (ref 31.4–37.4)
MCV RBC AUTO: 100 FL (ref 82–98)
MONOCYTES # BLD AUTO: 0.72 THOUSAND/ΜL (ref 0.17–1.22)
MONOCYTES NFR BLD AUTO: 8 % (ref 4–12)
NEUTROPHILS # BLD AUTO: 6.08 THOUSANDS/ΜL (ref 1.85–7.62)
NEUTS SEG NFR BLD AUTO: 67 % (ref 43–75)
NRBC BLD AUTO-RTO: 0 /100 WBCS
PLATELET # BLD AUTO: 108 THOUSANDS/UL (ref 149–390)
PMV BLD AUTO: 10.2 FL (ref 8.9–12.7)
POTASSIUM SERPL-SCNC: 5 MMOL/L (ref 3.5–5.3)
POTASSIUM UR-SCNC: 39.4 MMOL/L
PROTHROMBIN TIME: 25.3 SECONDS (ref 11.6–14.5)
RBC # BLD AUTO: 2.22 MILLION/UL (ref 3.81–5.12)
RH BLD: POSITIVE
SODIUM 24H UR-SCNC: 53 MOL/L
SODIUM SERPL-SCNC: 126 MMOL/L (ref 136–145)
SPECIMEN EXPIRATION DATE: NORMAL
WBC # BLD AUTO: 9.06 THOUSAND/UL (ref 4.31–10.16)

## 2020-06-13 PROCEDURE — 80048 BASIC METABOLIC PNL TOTAL CA: CPT | Performed by: FAMILY MEDICINE

## 2020-06-13 PROCEDURE — 99232 SBSQ HOSP IP/OBS MODERATE 35: CPT | Performed by: FAMILY MEDICINE

## 2020-06-13 PROCEDURE — 85025 COMPLETE CBC W/AUTO DIFF WBC: CPT | Performed by: FAMILY MEDICINE

## 2020-06-13 PROCEDURE — 84133 ASSAY OF URINE POTASSIUM: CPT | Performed by: INTERNAL MEDICINE

## 2020-06-13 PROCEDURE — 85730 THROMBOPLASTIN TIME PARTIAL: CPT | Performed by: FAMILY MEDICINE

## 2020-06-13 PROCEDURE — 99223 1ST HOSP IP/OBS HIGH 75: CPT | Performed by: INTERNAL MEDICINE

## 2020-06-13 PROCEDURE — 86850 RBC ANTIBODY SCREEN: CPT | Performed by: FAMILY MEDICINE

## 2020-06-13 PROCEDURE — 83935 ASSAY OF URINE OSMOLALITY: CPT | Performed by: INTERNAL MEDICINE

## 2020-06-13 PROCEDURE — 84300 ASSAY OF URINE SODIUM: CPT | Performed by: INTERNAL MEDICINE

## 2020-06-13 PROCEDURE — 85610 PROTHROMBIN TIME: CPT | Performed by: FAMILY MEDICINE

## 2020-06-13 PROCEDURE — 82436 ASSAY OF URINE CHLORIDE: CPT | Performed by: INTERNAL MEDICINE

## 2020-06-13 PROCEDURE — 86901 BLOOD TYPING SEROLOGIC RH(D): CPT | Performed by: FAMILY MEDICINE

## 2020-06-13 PROCEDURE — P9016 RBC LEUKOCYTES REDUCED: HCPCS

## 2020-06-13 PROCEDURE — 86900 BLOOD TYPING SEROLOGIC ABO: CPT | Performed by: FAMILY MEDICINE

## 2020-06-13 PROCEDURE — 86923 COMPATIBILITY TEST ELECTRIC: CPT

## 2020-06-13 PROCEDURE — 30233N1 TRANSFUSION OF NONAUTOLOGOUS RED BLOOD CELLS INTO PERIPHERAL VEIN, PERCUTANEOUS APPROACH: ICD-10-PCS | Performed by: FAMILY MEDICINE

## 2020-06-13 PROCEDURE — 82272 OCCULT BLD FECES 1-3 TESTS: CPT | Performed by: FAMILY MEDICINE

## 2020-06-13 PROCEDURE — 74022 RADEX COMPL AQT ABD SERIES: CPT

## 2020-06-13 RX ORDER — WARFARIN SODIUM 7.5 MG/1
7.5 TABLET ORAL
Status: DISCONTINUED | OUTPATIENT
Start: 2020-06-13 | End: 2020-06-15

## 2020-06-13 RX ORDER — HYDROCORTISONE ACETATE 25 MG/1
25 SUPPOSITORY RECTAL 2 TIMES DAILY
Status: DISCONTINUED | OUTPATIENT
Start: 2020-06-13 | End: 2020-06-14

## 2020-06-13 RX ADMIN — FERROUS SULFATE TAB 325 MG (65 MG ELEMENTAL FE) 325 MG: 325 (65 FE) TAB at 09:24

## 2020-06-13 RX ADMIN — ACETAMINOPHEN 650 MG: 325 TABLET, FILM COATED ORAL at 17:44

## 2020-06-13 RX ADMIN — MELATONIN 3 MG: 3 TAB ORAL at 21:15

## 2020-06-13 RX ADMIN — HYDROCORTISONE ACETATE 25 MG: 25 SUPPOSITORY RECTAL at 17:40

## 2020-06-13 RX ADMIN — WARFARIN SODIUM 7.5 MG: 7.5 TABLET ORAL at 17:39

## 2020-06-13 RX ADMIN — Medication 500 MG: at 09:24

## 2020-06-13 RX ADMIN — PANTOPRAZOLE SODIUM 40 MG: 40 TABLET, DELAYED RELEASE ORAL at 05:50

## 2020-06-14 LAB
ABO GROUP BLD BPU: NORMAL
ANION GAP SERPL CALCULATED.3IONS-SCNC: 8 MMOL/L (ref 4–13)
BASOPHILS # BLD AUTO: 0.04 THOUSANDS/ΜL (ref 0–0.1)
BASOPHILS NFR BLD AUTO: 0 % (ref 0–1)
BPU ID: NORMAL
BUN SERPL-MCNC: 23 MG/DL (ref 5–25)
CALCIUM SERPL-MCNC: 8.1 MG/DL (ref 8.3–10.1)
CHLORIDE SERPL-SCNC: 99 MMOL/L (ref 100–108)
CO2 SERPL-SCNC: 23 MMOL/L (ref 21–32)
CREAT SERPL-MCNC: 1.25 MG/DL (ref 0.6–1.3)
CROSSMATCH: NORMAL
EOSINOPHIL # BLD AUTO: 0.76 THOUSAND/ΜL (ref 0–0.61)
EOSINOPHIL NFR BLD AUTO: 8 % (ref 0–6)
ERYTHROCYTE [DISTWIDTH] IN BLOOD BY AUTOMATED COUNT: 15.1 % (ref 11.6–15.1)
GFR SERPL CREATININE-BSD FRML MDRD: 38 ML/MIN/1.73SQ M
GLUCOSE SERPL-MCNC: 99 MG/DL (ref 65–140)
HCT VFR BLD AUTO: 26.6 % (ref 34.8–46.1)
HGB BLD-MCNC: 8.6 G/DL (ref 11.5–15.4)
IMM GRANULOCYTES # BLD AUTO: 0.12 THOUSAND/UL (ref 0–0.2)
IMM GRANULOCYTES NFR BLD AUTO: 1 % (ref 0–2)
INR PPP: 2.89 (ref 0.84–1.19)
LYMPHOCYTES # BLD AUTO: 1.14 THOUSANDS/ΜL (ref 0.6–4.47)
LYMPHOCYTES NFR BLD AUTO: 12 % (ref 14–44)
MCH RBC QN AUTO: 30.6 PG (ref 26.8–34.3)
MCHC RBC AUTO-ENTMCNC: 32.3 G/DL (ref 31.4–37.4)
MCV RBC AUTO: 95 FL (ref 82–98)
MONOCYTES # BLD AUTO: 0.77 THOUSAND/ΜL (ref 0.17–1.22)
MONOCYTES NFR BLD AUTO: 8 % (ref 4–12)
NEUTROPHILS # BLD AUTO: 6.42 THOUSANDS/ΜL (ref 1.85–7.62)
NEUTS SEG NFR BLD AUTO: 71 % (ref 43–75)
NRBC BLD AUTO-RTO: 0 /100 WBCS
OSMOLALITY UR/SERPL-RTO: 274 MMOL/KG (ref 282–298)
OSMOLALITY UR: 272 MMOL/KG
PLATELET # BLD AUTO: 108 THOUSANDS/UL (ref 149–390)
PMV BLD AUTO: 10.5 FL (ref 8.9–12.7)
POTASSIUM SERPL-SCNC: 4.9 MMOL/L (ref 3.5–5.3)
PROTHROMBIN TIME: 30.6 SECONDS (ref 11.6–14.5)
RBC # BLD AUTO: 2.81 MILLION/UL (ref 3.81–5.12)
SODIUM SERPL-SCNC: 130 MMOL/L (ref 136–145)
UNIT DISPENSE STATUS: NORMAL
UNIT PRODUCT CODE: NORMAL
UNIT RH: NORMAL
URATE SERPL-MCNC: 3.8 MG/DL (ref 2–6.8)
WBC # BLD AUTO: 9.25 THOUSAND/UL (ref 4.31–10.16)

## 2020-06-14 PROCEDURE — 83930 ASSAY OF BLOOD OSMOLALITY: CPT | Performed by: INTERNAL MEDICINE

## 2020-06-14 PROCEDURE — 80048 BASIC METABOLIC PNL TOTAL CA: CPT | Performed by: FAMILY MEDICINE

## 2020-06-14 PROCEDURE — 85025 COMPLETE CBC W/AUTO DIFF WBC: CPT | Performed by: FAMILY MEDICINE

## 2020-06-14 PROCEDURE — 84550 ASSAY OF BLOOD/URIC ACID: CPT | Performed by: INTERNAL MEDICINE

## 2020-06-14 PROCEDURE — 99232 SBSQ HOSP IP/OBS MODERATE 35: CPT | Performed by: FAMILY MEDICINE

## 2020-06-14 PROCEDURE — 85610 PROTHROMBIN TIME: CPT | Performed by: FAMILY MEDICINE

## 2020-06-14 PROCEDURE — 99232 SBSQ HOSP IP/OBS MODERATE 35: CPT | Performed by: INTERNAL MEDICINE

## 2020-06-14 RX ADMIN — MELATONIN 3 MG: 3 TAB ORAL at 22:46

## 2020-06-14 RX ADMIN — ACETAMINOPHEN 650 MG: 325 TABLET, FILM COATED ORAL at 22:46

## 2020-06-14 RX ADMIN — FERROUS SULFATE TAB 325 MG (65 MG ELEMENTAL FE) 325 MG: 325 (65 FE) TAB at 08:51

## 2020-06-14 RX ADMIN — WARFARIN SODIUM 7.5 MG: 7.5 TABLET ORAL at 17:20

## 2020-06-14 RX ADMIN — FLUTICASONE PROPIONATE 1 SPRAY: 50 SPRAY, METERED NASAL at 08:51

## 2020-06-14 RX ADMIN — PANTOPRAZOLE SODIUM 40 MG: 40 TABLET, DELAYED RELEASE ORAL at 05:24

## 2020-06-14 RX ADMIN — Medication 500 MG: at 08:52

## 2020-06-14 RX ADMIN — HYDROCORTISONE ACETATE 25 MG: 25 SUPPOSITORY RECTAL at 08:51

## 2020-06-15 ENCOUNTER — APPOINTMENT (INPATIENT)
Dept: CT IMAGING | Facility: HOSPITAL | Age: 85
DRG: 690 | End: 2020-06-15
Payer: MEDICARE

## 2020-06-15 ENCOUNTER — APPOINTMENT (INPATIENT)
Dept: RADIOLOGY | Facility: HOSPITAL | Age: 85
DRG: 690 | End: 2020-06-15
Payer: MEDICARE

## 2020-06-15 LAB
ANION GAP SERPL CALCULATED.3IONS-SCNC: 7 MMOL/L (ref 4–13)
BASOPHILS # BLD AUTO: 0.05 THOUSANDS/ΜL (ref 0–0.1)
BASOPHILS NFR BLD AUTO: 1 % (ref 0–1)
BUN SERPL-MCNC: 25 MG/DL (ref 5–25)
CALCIUM SERPL-MCNC: 8.6 MG/DL (ref 8.3–10.1)
CHLORIDE SERPL-SCNC: 98 MMOL/L (ref 100–108)
CO2 SERPL-SCNC: 26 MMOL/L (ref 21–32)
CREAT SERPL-MCNC: 1.3 MG/DL (ref 0.6–1.3)
D DIMER PPP FEU-MCNC: >10 UG/ML FEU
EOSINOPHIL # BLD AUTO: 0.84 THOUSAND/ΜL (ref 0–0.61)
EOSINOPHIL NFR BLD AUTO: 11 % (ref 0–6)
ERYTHROCYTE [DISTWIDTH] IN BLOOD BY AUTOMATED COUNT: 15.1 % (ref 11.6–15.1)
GFR SERPL CREATININE-BSD FRML MDRD: 36 ML/MIN/1.73SQ M
GLUCOSE SERPL-MCNC: 117 MG/DL (ref 65–140)
HCT VFR BLD AUTO: 29.8 % (ref 34.8–46.1)
HGB BLD-MCNC: 9.6 G/DL (ref 11.5–15.4)
IMM GRANULOCYTES # BLD AUTO: 0.09 THOUSAND/UL (ref 0–0.2)
IMM GRANULOCYTES NFR BLD AUTO: 1 % (ref 0–2)
INR PPP: 3.39 (ref 0.84–1.19)
LYMPHOCYTES # BLD AUTO: 0.82 THOUSANDS/ΜL (ref 0.6–4.47)
LYMPHOCYTES NFR BLD AUTO: 11 % (ref 14–44)
MCH RBC QN AUTO: 30.7 PG (ref 26.8–34.3)
MCHC RBC AUTO-ENTMCNC: 32.2 G/DL (ref 31.4–37.4)
MCV RBC AUTO: 95 FL (ref 82–98)
MONOCYTES # BLD AUTO: 0.31 THOUSAND/ΜL (ref 0.17–1.22)
MONOCYTES NFR BLD AUTO: 4 % (ref 4–12)
NEUTROPHILS # BLD AUTO: 5.29 THOUSANDS/ΜL (ref 1.85–7.62)
NEUTS SEG NFR BLD AUTO: 72 % (ref 43–75)
NRBC BLD AUTO-RTO: 0 /100 WBCS
PLATELET # BLD AUTO: 117 THOUSANDS/UL (ref 149–390)
PMV BLD AUTO: 9.9 FL (ref 8.9–12.7)
POTASSIUM SERPL-SCNC: 4.2 MMOL/L (ref 3.5–5.3)
PROTHROMBIN TIME: 34.8 SECONDS (ref 11.6–14.5)
RBC # BLD AUTO: 3.13 MILLION/UL (ref 3.81–5.12)
SODIUM SERPL-SCNC: 131 MMOL/L (ref 136–145)
WBC # BLD AUTO: 7.4 THOUSAND/UL (ref 4.31–10.16)

## 2020-06-15 PROCEDURE — 99232 SBSQ HOSP IP/OBS MODERATE 35: CPT | Performed by: FAMILY MEDICINE

## 2020-06-15 PROCEDURE — 85025 COMPLETE CBC W/AUTO DIFF WBC: CPT | Performed by: FAMILY MEDICINE

## 2020-06-15 PROCEDURE — 80048 BASIC METABOLIC PNL TOTAL CA: CPT | Performed by: FAMILY MEDICINE

## 2020-06-15 PROCEDURE — 85610 PROTHROMBIN TIME: CPT | Performed by: FAMILY MEDICINE

## 2020-06-15 PROCEDURE — 71275 CT ANGIOGRAPHY CHEST: CPT

## 2020-06-15 PROCEDURE — 99232 SBSQ HOSP IP/OBS MODERATE 35: CPT | Performed by: INTERNAL MEDICINE

## 2020-06-15 PROCEDURE — 85379 FIBRIN DEGRADATION QUANT: CPT | Performed by: FAMILY MEDICINE

## 2020-06-15 PROCEDURE — 71045 X-RAY EXAM CHEST 1 VIEW: CPT

## 2020-06-15 RX ORDER — HYDROCORTISONE ACETATE 25 MG/1
25 SUPPOSITORY RECTAL 2 TIMES DAILY
Status: DISCONTINUED | OUTPATIENT
Start: 2020-06-15 | End: 2020-06-17 | Stop reason: HOSPADM

## 2020-06-15 RX ADMIN — MELATONIN 3 MG: 3 TAB ORAL at 21:29

## 2020-06-15 RX ADMIN — DOCUSATE SODIUM 100 MG: 100 CAPSULE, LIQUID FILLED ORAL at 08:10

## 2020-06-15 RX ADMIN — FERROUS SULFATE TAB 325 MG (65 MG ELEMENTAL FE) 325 MG: 325 (65 FE) TAB at 08:10

## 2020-06-15 RX ADMIN — Medication 500 MG: at 08:10

## 2020-06-15 RX ADMIN — IOHEXOL 76 ML: 350 INJECTION, SOLUTION INTRAVENOUS at 15:13

## 2020-06-15 RX ADMIN — PANTOPRAZOLE SODIUM 40 MG: 40 TABLET, DELAYED RELEASE ORAL at 07:16

## 2020-06-16 LAB
ANION GAP SERPL CALCULATED.3IONS-SCNC: 5 MMOL/L (ref 4–13)
BASOPHILS # BLD AUTO: 0.05 THOUSANDS/ΜL (ref 0–0.1)
BASOPHILS NFR BLD AUTO: 1 % (ref 0–1)
BUN SERPL-MCNC: 26 MG/DL (ref 5–25)
CALCIUM SERPL-MCNC: 8.1 MG/DL (ref 8.3–10.1)
CHLORIDE SERPL-SCNC: 96 MMOL/L (ref 100–108)
CO2 SERPL-SCNC: 26 MMOL/L (ref 21–32)
CREAT SERPL-MCNC: 1.26 MG/DL (ref 0.6–1.3)
EOSINOPHIL # BLD AUTO: 0.8 THOUSAND/ΜL (ref 0–0.61)
EOSINOPHIL NFR BLD AUTO: 9 % (ref 0–6)
ERYTHROCYTE [DISTWIDTH] IN BLOOD BY AUTOMATED COUNT: 14.9 % (ref 11.6–15.1)
FERRITIN SERPL-MCNC: 575 NG/ML (ref 8–388)
GFR SERPL CREATININE-BSD FRML MDRD: 38 ML/MIN/1.73SQ M
GLUCOSE SERPL-MCNC: 100 MG/DL (ref 65–140)
HCT VFR BLD AUTO: 24.5 % (ref 34.8–46.1)
HGB BLD-MCNC: 8.1 G/DL (ref 11.5–15.4)
IMM GRANULOCYTES # BLD AUTO: 0.1 THOUSAND/UL (ref 0–0.2)
IMM GRANULOCYTES NFR BLD AUTO: 1 % (ref 0–2)
INR PPP: 3.25 (ref 0.84–1.19)
IRON SATN MFR SERPL: 13 %
IRON SERPL-MCNC: 19 UG/DL (ref 50–170)
LYMPHOCYTES # BLD AUTO: 1.2 THOUSANDS/ΜL (ref 0.6–4.47)
LYMPHOCYTES NFR BLD AUTO: 14 % (ref 14–44)
MCH RBC QN AUTO: 30.8 PG (ref 26.8–34.3)
MCHC RBC AUTO-ENTMCNC: 33.1 G/DL (ref 31.4–37.4)
MCV RBC AUTO: 93 FL (ref 82–98)
MONOCYTES # BLD AUTO: 0.81 THOUSAND/ΜL (ref 0.17–1.22)
MONOCYTES NFR BLD AUTO: 9 % (ref 4–12)
NEUTROPHILS # BLD AUTO: 5.92 THOUSANDS/ΜL (ref 1.85–7.62)
NEUTS SEG NFR BLD AUTO: 66 % (ref 43–75)
NRBC BLD AUTO-RTO: 0 /100 WBCS
PLATELET # BLD AUTO: 119 THOUSANDS/UL (ref 149–390)
PMV BLD AUTO: 9.9 FL (ref 8.9–12.7)
POTASSIUM SERPL-SCNC: 4.9 MMOL/L (ref 3.5–5.3)
PROTHROMBIN TIME: 33.6 SECONDS (ref 11.6–14.5)
RBC # BLD AUTO: 2.63 MILLION/UL (ref 3.81–5.12)
SODIUM SERPL-SCNC: 127 MMOL/L (ref 136–145)
TIBC SERPL-MCNC: 150 UG/DL (ref 250–450)
TSH SERPL DL<=0.05 MIU/L-ACNC: 0.74 UIU/ML (ref 0.36–3.74)
WBC # BLD AUTO: 8.88 THOUSAND/UL (ref 4.31–10.16)

## 2020-06-16 PROCEDURE — 99232 SBSQ HOSP IP/OBS MODERATE 35: CPT | Performed by: INTERNAL MEDICINE

## 2020-06-16 PROCEDURE — 80048 BASIC METABOLIC PNL TOTAL CA: CPT | Performed by: FAMILY MEDICINE

## 2020-06-16 PROCEDURE — 82728 ASSAY OF FERRITIN: CPT | Performed by: FAMILY MEDICINE

## 2020-06-16 PROCEDURE — 85610 PROTHROMBIN TIME: CPT | Performed by: FAMILY MEDICINE

## 2020-06-16 PROCEDURE — 84443 ASSAY THYROID STIM HORMONE: CPT | Performed by: FAMILY MEDICINE

## 2020-06-16 PROCEDURE — 83550 IRON BINDING TEST: CPT | Performed by: FAMILY MEDICINE

## 2020-06-16 PROCEDURE — 99233 SBSQ HOSP IP/OBS HIGH 50: CPT | Performed by: FAMILY MEDICINE

## 2020-06-16 PROCEDURE — 85025 COMPLETE CBC W/AUTO DIFF WBC: CPT | Performed by: FAMILY MEDICINE

## 2020-06-16 PROCEDURE — 94761 N-INVAS EAR/PLS OXIMETRY MLT: CPT

## 2020-06-16 PROCEDURE — 83540 ASSAY OF IRON: CPT | Performed by: FAMILY MEDICINE

## 2020-06-16 RX ORDER — SODIUM CHLORIDE 1000 MG
2 TABLET, SOLUBLE MISCELLANEOUS
Status: DISCONTINUED | OUTPATIENT
Start: 2020-06-16 | End: 2020-06-17 | Stop reason: HOSPADM

## 2020-06-16 RX ORDER — SODIUM CHLORIDE 1000 MG
3 TABLET, SOLUBLE MISCELLANEOUS
Status: DISCONTINUED | OUTPATIENT
Start: 2020-06-16 | End: 2020-06-16

## 2020-06-16 RX ORDER — FUROSEMIDE 10 MG/ML
40 INJECTION INTRAMUSCULAR; INTRAVENOUS ONCE
Status: COMPLETED | OUTPATIENT
Start: 2020-06-16 | End: 2020-06-16

## 2020-06-16 RX ADMIN — PANTOPRAZOLE SODIUM 40 MG: 40 TABLET, DELAYED RELEASE ORAL at 05:10

## 2020-06-16 RX ADMIN — Medication 2 G: at 15:54

## 2020-06-16 RX ADMIN — FUROSEMIDE 40 MG: 10 INJECTION, SOLUTION INTRAMUSCULAR; INTRAVENOUS at 11:54

## 2020-06-16 RX ADMIN — DOCUSATE SODIUM 100 MG: 100 CAPSULE, LIQUID FILLED ORAL at 08:41

## 2020-06-16 RX ADMIN — Medication 500 MG: at 08:41

## 2020-06-16 RX ADMIN — Medication 2 G: at 11:54

## 2020-06-16 RX ADMIN — Medication 3 G: at 09:41

## 2020-06-16 RX ADMIN — MELATONIN 3 MG: 3 TAB ORAL at 21:21

## 2020-06-16 RX ADMIN — FERROUS SULFATE TAB 325 MG (65 MG ELEMENTAL FE) 325 MG: 325 (65 FE) TAB at 08:41

## 2020-06-17 VITALS
TEMPERATURE: 98.1 F | RESPIRATION RATE: 15 BRPM | HEIGHT: 61 IN | HEART RATE: 75 BPM | OXYGEN SATURATION: 95 % | BODY MASS INDEX: 23.18 KG/M2 | SYSTOLIC BLOOD PRESSURE: 128 MMHG | DIASTOLIC BLOOD PRESSURE: 56 MMHG | WEIGHT: 122.8 LBS

## 2020-06-17 LAB
ANION GAP SERPL CALCULATED.3IONS-SCNC: 6 MMOL/L (ref 4–13)
BASOPHILS # BLD AUTO: 0.05 THOUSANDS/ΜL (ref 0–0.1)
BASOPHILS NFR BLD AUTO: 1 % (ref 0–1)
BUN SERPL-MCNC: 32 MG/DL (ref 5–25)
CALCIUM SERPL-MCNC: 8 MG/DL (ref 8.3–10.1)
CHLORIDE SERPL-SCNC: 97 MMOL/L (ref 100–108)
CO2 SERPL-SCNC: 28 MMOL/L (ref 21–32)
CREAT SERPL-MCNC: 1.44 MG/DL (ref 0.6–1.3)
EOSINOPHIL # BLD AUTO: 0.72 THOUSAND/ΜL (ref 0–0.61)
EOSINOPHIL NFR BLD AUTO: 8 % (ref 0–6)
ERYTHROCYTE [DISTWIDTH] IN BLOOD BY AUTOMATED COUNT: 15 % (ref 11.6–15.1)
GFR SERPL CREATININE-BSD FRML MDRD: 32 ML/MIN/1.73SQ M
GLUCOSE SERPL-MCNC: 105 MG/DL (ref 65–140)
HCT VFR BLD AUTO: 25.6 % (ref 34.8–46.1)
HGB BLD-MCNC: 8.3 G/DL (ref 11.5–15.4)
IMM GRANULOCYTES # BLD AUTO: 0.11 THOUSAND/UL (ref 0–0.2)
IMM GRANULOCYTES NFR BLD AUTO: 1 % (ref 0–2)
INR PPP: 2.33 (ref 0.84–1.19)
LYMPHOCYTES # BLD AUTO: 1.06 THOUSANDS/ΜL (ref 0.6–4.47)
LYMPHOCYTES NFR BLD AUTO: 11 % (ref 14–44)
MCH RBC QN AUTO: 30.3 PG (ref 26.8–34.3)
MCHC RBC AUTO-ENTMCNC: 32.4 G/DL (ref 31.4–37.4)
MCV RBC AUTO: 93 FL (ref 82–98)
MONOCYTES # BLD AUTO: 0.86 THOUSAND/ΜL (ref 0.17–1.22)
MONOCYTES NFR BLD AUTO: 9 % (ref 4–12)
NEUTROPHILS # BLD AUTO: 6.81 THOUSANDS/ΜL (ref 1.85–7.62)
NEUTS SEG NFR BLD AUTO: 70 % (ref 43–75)
NRBC BLD AUTO-RTO: 0 /100 WBCS
PLATELET # BLD AUTO: 141 THOUSANDS/UL (ref 149–390)
PMV BLD AUTO: 9.8 FL (ref 8.9–12.7)
POTASSIUM SERPL-SCNC: 4.5 MMOL/L (ref 3.5–5.3)
PROTHROMBIN TIME: 25.8 SECONDS (ref 11.6–14.5)
RBC # BLD AUTO: 2.74 MILLION/UL (ref 3.81–5.12)
SODIUM SERPL-SCNC: 131 MMOL/L (ref 136–145)
WBC # BLD AUTO: 9.61 THOUSAND/UL (ref 4.31–10.16)

## 2020-06-17 PROCEDURE — 85610 PROTHROMBIN TIME: CPT | Performed by: FAMILY MEDICINE

## 2020-06-17 PROCEDURE — 85025 COMPLETE CBC W/AUTO DIFF WBC: CPT | Performed by: FAMILY MEDICINE

## 2020-06-17 PROCEDURE — 80048 BASIC METABOLIC PNL TOTAL CA: CPT | Performed by: FAMILY MEDICINE

## 2020-06-17 PROCEDURE — 99232 SBSQ HOSP IP/OBS MODERATE 35: CPT | Performed by: INTERNAL MEDICINE

## 2020-06-17 PROCEDURE — 99239 HOSP IP/OBS DSCHRG MGMT >30: CPT | Performed by: FAMILY MEDICINE

## 2020-06-17 RX ORDER — SODIUM CHLORIDE 1000 MG
2 TABLET, SOLUBLE MISCELLANEOUS
Qty: 180 TABLET | Refills: 0 | Status: SHIPPED | OUTPATIENT
Start: 2020-06-17 | End: 2020-07-17

## 2020-06-17 RX ORDER — WARFARIN SODIUM 4 MG/1
4 TABLET ORAL
Qty: 30 TABLET | Refills: 0 | Status: SHIPPED | OUTPATIENT
Start: 2020-06-17

## 2020-06-17 RX ORDER — FERROUS SULFATE 325(65) MG
325 TABLET ORAL
Qty: 30 TABLET | Refills: 0 | Status: SHIPPED | OUTPATIENT
Start: 2020-06-18 | End: 2020-07-18

## 2020-06-17 RX ORDER — WARFARIN SODIUM 4 MG/1
4 TABLET ORAL
Status: COMPLETED | OUTPATIENT
Start: 2020-06-17 | End: 2020-06-17

## 2020-06-17 RX ORDER — WARFARIN SODIUM 4 MG/1
4 TABLET ORAL
Status: DISCONTINUED | OUTPATIENT
Start: 2020-06-17 | End: 2020-06-17

## 2020-06-17 RX ADMIN — FERROUS SULFATE TAB 325 MG (65 MG ELEMENTAL FE) 325 MG: 325 (65 FE) TAB at 08:11

## 2020-06-17 RX ADMIN — PANTOPRAZOLE SODIUM 40 MG: 40 TABLET, DELAYED RELEASE ORAL at 05:01

## 2020-06-17 RX ADMIN — WARFARIN SODIUM 4 MG: 4 TABLET ORAL at 14:46

## 2020-06-17 RX ADMIN — Medication 2 G: at 12:04

## 2020-06-17 RX ADMIN — Medication 500 MG: at 08:11

## 2020-06-17 RX ADMIN — Medication 2 G: at 08:11

## 2020-06-19 ENCOUNTER — LAB REQUISITION (OUTPATIENT)
Dept: LAB | Facility: HOSPITAL | Age: 85
DRG: 698 | End: 2020-06-19
Payer: MEDICARE

## 2020-06-19 DIAGNOSIS — R79.1 ABNORMAL COAGULATION PROFILE: ICD-10-CM

## 2020-06-19 LAB
INR PPP: 1.48 (ref 0.84–1.19)
PROTHROMBIN TIME: 18 SECONDS (ref 11.6–14.5)

## 2020-06-19 PROCEDURE — 85610 PROTHROMBIN TIME: CPT | Performed by: FAMILY MEDICINE

## 2020-06-22 ENCOUNTER — APPOINTMENT (EMERGENCY)
Dept: CT IMAGING | Facility: HOSPITAL | Age: 85
DRG: 698 | End: 2020-06-22
Payer: MEDICARE

## 2020-06-22 ENCOUNTER — HOSPITAL ENCOUNTER (INPATIENT)
Facility: HOSPITAL | Age: 85
LOS: 11 days | Discharge: HOME HEALTH | DRG: 698 | End: 2020-07-03
Attending: EMERGENCY MEDICINE | Admitting: FAMILY MEDICINE
Payer: MEDICARE

## 2020-06-22 DIAGNOSIS — C56.9 OVARIAN CA (HCC): ICD-10-CM

## 2020-06-22 DIAGNOSIS — J96.01 ACUTE RESPIRATORY FAILURE WITH HYPOXEMIA (HCC): ICD-10-CM

## 2020-06-22 DIAGNOSIS — C50.919 BREAST CA (HCC): ICD-10-CM

## 2020-06-22 DIAGNOSIS — N13.4 HYDROURETER ON RIGHT: ICD-10-CM

## 2020-06-22 DIAGNOSIS — N39.0 URINARY TRACT INFECTION: Primary | ICD-10-CM

## 2020-06-22 DIAGNOSIS — R09.02 HYPOXIA: ICD-10-CM

## 2020-06-22 DIAGNOSIS — R93.5 ABNORMAL CT OF THE ABDOMEN: ICD-10-CM

## 2020-06-22 DIAGNOSIS — D50.9 IRON DEFICIENCY ANEMIA, UNSPECIFIED IRON DEFICIENCY ANEMIA TYPE: ICD-10-CM

## 2020-06-22 DIAGNOSIS — N18.30 CKD (CHRONIC KIDNEY DISEASE) STAGE 3, GFR 30-59 ML/MIN (HCC): ICD-10-CM

## 2020-06-22 DIAGNOSIS — R53.1 GENERALIZED WEAKNESS: ICD-10-CM

## 2020-06-22 PROBLEM — N13.30 HYDROURETERONEPHROSIS: Status: ACTIVE | Noted: 2020-06-22

## 2020-06-22 PROBLEM — T83.511A URINARY TRACT INFECTION ASSOCIATED WITH INDWELLING URETHRAL CATHETER (HCC): Chronic | Status: ACTIVE | Noted: 2020-06-22

## 2020-06-22 LAB
ALBUMIN SERPL BCP-MCNC: 2.2 G/DL (ref 3.5–5)
ALP SERPL-CCNC: 153 U/L (ref 46–116)
ALT SERPL W P-5'-P-CCNC: 23 U/L (ref 12–78)
ANION GAP SERPL CALCULATED.3IONS-SCNC: 8 MMOL/L (ref 4–13)
APTT PPP: 56 SECONDS (ref 23–37)
AST SERPL W P-5'-P-CCNC: 26 U/L (ref 5–45)
ATRIAL RATE: 81 BPM
BACTERIA UR QL AUTO: ABNORMAL /HPF
BASOPHILS # BLD AUTO: 0.04 THOUSANDS/ΜL (ref 0–0.1)
BASOPHILS NFR BLD AUTO: 1 % (ref 0–1)
BILIRUB SERPL-MCNC: 0.5 MG/DL (ref 0.2–1)
BILIRUB UR QL STRIP: NEGATIVE
BUN SERPL-MCNC: 22 MG/DL (ref 5–25)
CALCIUM SERPL-MCNC: 8 MG/DL (ref 8.3–10.1)
CHLORIDE SERPL-SCNC: 102 MMOL/L (ref 100–108)
CLARITY UR: ABNORMAL
CO2 SERPL-SCNC: 22 MMOL/L (ref 21–32)
COLOR UR: YELLOW
CREAT SERPL-MCNC: 1.17 MG/DL (ref 0.6–1.3)
EOSINOPHIL # BLD AUTO: 0.24 THOUSAND/ΜL (ref 0–0.61)
EOSINOPHIL NFR BLD AUTO: 3 % (ref 0–6)
ERYTHROCYTE [DISTWIDTH] IN BLOOD BY AUTOMATED COUNT: 15.3 % (ref 11.6–15.1)
GFR SERPL CREATININE-BSD FRML MDRD: 41 ML/MIN/1.73SQ M
GLUCOSE SERPL-MCNC: 89 MG/DL (ref 65–140)
GLUCOSE UR STRIP-MCNC: NEGATIVE MG/DL
HCT VFR BLD AUTO: 25.1 % (ref 34.8–46.1)
HGB BLD-MCNC: 8.2 G/DL (ref 11.5–15.4)
HGB UR QL STRIP.AUTO: ABNORMAL
IMM GRANULOCYTES # BLD AUTO: 0.05 THOUSAND/UL (ref 0–0.2)
IMM GRANULOCYTES NFR BLD AUTO: 1 % (ref 0–2)
INR PPP: 2.12 (ref 0.84–1.19)
KETONES UR STRIP-MCNC: NEGATIVE MG/DL
LACTATE SERPL-SCNC: 0.8 MMOL/L (ref 0.5–2)
LEUKOCYTE ESTERASE UR QL STRIP: ABNORMAL
LYMPHOCYTES # BLD AUTO: 1 THOUSANDS/ΜL (ref 0.6–4.47)
LYMPHOCYTES NFR BLD AUTO: 12 % (ref 14–44)
MCH RBC QN AUTO: 30.7 PG (ref 26.8–34.3)
MCHC RBC AUTO-ENTMCNC: 32.7 G/DL (ref 31.4–37.4)
MCV RBC AUTO: 94 FL (ref 82–98)
MONOCYTES # BLD AUTO: 0.66 THOUSAND/ΜL (ref 0.17–1.22)
MONOCYTES NFR BLD AUTO: 8 % (ref 4–12)
NEUTROPHILS # BLD AUTO: 6.29 THOUSANDS/ΜL (ref 1.85–7.62)
NEUTS SEG NFR BLD AUTO: 75 % (ref 43–75)
NITRITE UR QL STRIP: POSITIVE
NON-SQ EPI CELLS URNS QL MICRO: ABNORMAL /HPF
NRBC BLD AUTO-RTO: 0 /100 WBCS
P AXIS: 56 DEGREES
PH UR STRIP.AUTO: 6.5 [PH]
PLATELET # BLD AUTO: 123 THOUSANDS/UL (ref 149–390)
PMV BLD AUTO: 9.6 FL (ref 8.9–12.7)
POTASSIUM SERPL-SCNC: 4.4 MMOL/L (ref 3.5–5.3)
PR INTERVAL: 198 MS
PROCALCITONIN SERPL-MCNC: 0.14 NG/ML
PROT SERPL-MCNC: 5.9 G/DL (ref 6.4–8.2)
PROT UR STRIP-MCNC: ABNORMAL MG/DL
PROTHROMBIN TIME: 24 SECONDS (ref 11.6–14.5)
QRS AXIS: -29 DEGREES
QRSD INTERVAL: 88 MS
QT INTERVAL: 412 MS
QTC INTERVAL: 478 MS
RBC # BLD AUTO: 2.67 MILLION/UL (ref 3.81–5.12)
RBC #/AREA URNS AUTO: ABNORMAL /HPF
SARS-COV-2 RNA RESP QL NAA+PROBE: NEGATIVE
SODIUM SERPL-SCNC: 132 MMOL/L (ref 136–145)
SP GR UR STRIP.AUTO: 1.02 (ref 1–1.03)
T WAVE AXIS: 34 DEGREES
TROPONIN I SERPL-MCNC: <0.02 NG/ML
TSH SERPL DL<=0.05 MIU/L-ACNC: 1.3 UIU/ML (ref 0.36–3.74)
UROBILINOGEN UR QL STRIP.AUTO: 0.2 E.U./DL
VENTRICULAR RATE: 81 BPM
WBC # BLD AUTO: 8.28 THOUSAND/UL (ref 4.31–10.16)
WBC #/AREA URNS AUTO: ABNORMAL /HPF

## 2020-06-22 PROCEDURE — 99285 EMERGENCY DEPT VISIT HI MDM: CPT

## 2020-06-22 PROCEDURE — 80053 COMPREHEN METABOLIC PANEL: CPT | Performed by: PHYSICIAN ASSISTANT

## 2020-06-22 PROCEDURE — 84443 ASSAY THYROID STIM HORMONE: CPT | Performed by: PHYSICIAN ASSISTANT

## 2020-06-22 PROCEDURE — 36415 COLL VENOUS BLD VENIPUNCTURE: CPT | Performed by: PHYSICIAN ASSISTANT

## 2020-06-22 PROCEDURE — 87077 CULTURE AEROBIC IDENTIFY: CPT | Performed by: PHYSICIAN ASSISTANT

## 2020-06-22 PROCEDURE — 93005 ELECTROCARDIOGRAM TRACING: CPT

## 2020-06-22 PROCEDURE — 84145 PROCALCITONIN (PCT): CPT | Performed by: PHYSICIAN ASSISTANT

## 2020-06-22 PROCEDURE — 81001 URINALYSIS AUTO W/SCOPE: CPT | Performed by: PHYSICIAN ASSISTANT

## 2020-06-22 PROCEDURE — 99285 EMERGENCY DEPT VISIT HI MDM: CPT | Performed by: PHYSICIAN ASSISTANT

## 2020-06-22 PROCEDURE — 87040 BLOOD CULTURE FOR BACTERIA: CPT | Performed by: PHYSICIAN ASSISTANT

## 2020-06-22 PROCEDURE — 87186 SC STD MICRODIL/AGAR DIL: CPT | Performed by: PHYSICIAN ASSISTANT

## 2020-06-22 PROCEDURE — 71250 CT THORAX DX C-: CPT

## 2020-06-22 PROCEDURE — 93010 ELECTROCARDIOGRAM REPORT: CPT | Performed by: INTERNAL MEDICINE

## 2020-06-22 PROCEDURE — 87181 SC STD AGAR DILUTION PER AGT: CPT | Performed by: PHYSICIAN ASSISTANT

## 2020-06-22 PROCEDURE — 87635 SARS-COV-2 COVID-19 AMP PRB: CPT | Performed by: PHYSICIAN ASSISTANT

## 2020-06-22 PROCEDURE — 85025 COMPLETE CBC W/AUTO DIFF WBC: CPT | Performed by: PHYSICIAN ASSISTANT

## 2020-06-22 PROCEDURE — 99222 1ST HOSP IP/OBS MODERATE 55: CPT | Performed by: FAMILY MEDICINE

## 2020-06-22 PROCEDURE — 87185 SC STD ENZYME DETCJ PER NZM: CPT | Performed by: PHYSICIAN ASSISTANT

## 2020-06-22 PROCEDURE — 83605 ASSAY OF LACTIC ACID: CPT | Performed by: PHYSICIAN ASSISTANT

## 2020-06-22 PROCEDURE — 96360 HYDRATION IV INFUSION INIT: CPT

## 2020-06-22 PROCEDURE — 84484 ASSAY OF TROPONIN QUANT: CPT | Performed by: PHYSICIAN ASSISTANT

## 2020-06-22 PROCEDURE — 96361 HYDRATE IV INFUSION ADD-ON: CPT

## 2020-06-22 PROCEDURE — 85610 PROTHROMBIN TIME: CPT | Performed by: PHYSICIAN ASSISTANT

## 2020-06-22 PROCEDURE — 87086 URINE CULTURE/COLONY COUNT: CPT | Performed by: PHYSICIAN ASSISTANT

## 2020-06-22 PROCEDURE — 70450 CT HEAD/BRAIN W/O DYE: CPT

## 2020-06-22 PROCEDURE — 85730 THROMBOPLASTIN TIME PARTIAL: CPT | Performed by: PHYSICIAN ASSISTANT

## 2020-06-22 PROCEDURE — 74176 CT ABD & PELVIS W/O CONTRAST: CPT

## 2020-06-22 RX ORDER — FERROUS SULFATE 325(65) MG
325 TABLET ORAL
Status: DISCONTINUED | OUTPATIENT
Start: 2020-06-23 | End: 2020-07-03 | Stop reason: HOSPADM

## 2020-06-22 RX ORDER — POLYETHYLENE GLYCOL 3350 17 G/17G
17 POWDER, FOR SOLUTION ORAL DAILY PRN
Status: DISCONTINUED | OUTPATIENT
Start: 2020-06-22 | End: 2020-07-03 | Stop reason: HOSPADM

## 2020-06-22 RX ORDER — SODIUM CHLORIDE 1000 MG
2 TABLET, SOLUBLE MISCELLANEOUS
Status: DISCONTINUED | OUTPATIENT
Start: 2020-06-22 | End: 2020-07-03 | Stop reason: HOSPADM

## 2020-06-22 RX ORDER — ACETAMINOPHEN 325 MG/1
650 TABLET ORAL EVERY 6 HOURS PRN
Status: DISCONTINUED | OUTPATIENT
Start: 2020-06-22 | End: 2020-07-03 | Stop reason: HOSPADM

## 2020-06-22 RX ORDER — SODIUM CHLORIDE 9 MG/ML
70 INJECTION, SOLUTION INTRAVENOUS CONTINUOUS
Status: DISPENSED | OUTPATIENT
Start: 2020-06-22 | End: 2020-06-23

## 2020-06-22 RX ORDER — PANTOPRAZOLE SODIUM 20 MG/1
20 TABLET, DELAYED RELEASE ORAL
Status: DISCONTINUED | OUTPATIENT
Start: 2020-06-23 | End: 2020-07-03 | Stop reason: HOSPADM

## 2020-06-22 RX ORDER — ALBUTEROL SULFATE 90 UG/1
2 AEROSOL, METERED RESPIRATORY (INHALATION) EVERY 4 HOURS PRN
Status: DISCONTINUED | OUTPATIENT
Start: 2020-06-22 | End: 2020-07-03 | Stop reason: HOSPADM

## 2020-06-22 RX ORDER — WARFARIN SODIUM 4 MG/1
4 TABLET ORAL
Status: DISCONTINUED | OUTPATIENT
Start: 2020-06-22 | End: 2020-06-23

## 2020-06-22 RX ORDER — FLUTICASONE PROPIONATE 50 MCG
1 SPRAY, SUSPENSION (ML) NASAL DAILY PRN
Status: DISCONTINUED | OUTPATIENT
Start: 2020-06-22 | End: 2020-07-03 | Stop reason: HOSPADM

## 2020-06-22 RX ADMIN — WARFARIN SODIUM 4 MG: 4 TABLET ORAL at 18:16

## 2020-06-22 RX ADMIN — SODIUM CHLORIDE 70 ML/HR: 0.9 INJECTION, SOLUTION INTRAVENOUS at 18:17

## 2020-06-22 RX ADMIN — CEFTRIAXONE SODIUM 1000 MG: 10 INJECTION, POWDER, FOR SOLUTION INTRAVENOUS at 13:37

## 2020-06-22 RX ADMIN — Medication 2 G: at 18:16

## 2020-06-22 RX ADMIN — SODIUM CHLORIDE 1000 ML: 0.9 INJECTION, SOLUTION INTRAVENOUS at 11:36

## 2020-06-22 NOTE — H&P
H&P- Dewayne Felipe 9/20/1930, 80 y o  female MRN: [id number]    Unit/Bed#: ED 25 Encounter: [id number]    Primary Care Provider: Alicja Irwin DO   Date and time admitted to hospital: 6/22/2020 10:22 AM        Hydroureteronephrosis  Assessment & Plan  CT abdomen showed: Moderate to severe right hydroureteronephrosis with  high attenuation seen within the collecting system, possibly related to hemorrhage or abnormal excretion of contrast from the right kidney related to previous CT study about 1 week ago  There is abrupt nonvisualization of the right ureter at the UVJ with associated soft tissue fullness and bladder wall thickening  Correlate with cystoscopy regarding possible bladder neoplasm  Additionally, there is obscuration of the soft tissue planes between   the vaginal cuff and posterior right bladder wall (best seen on series 2 image 102)  The possibility of potential neoplastic involvement of this region cannot be excluded    - urology on-call was [place] texted to review chart to decide on inpatient versus outpatient workup including possible cystoscopy    Anemia  Assessment & Plan  - history of anemia, takes oral iron daily, hemoglobin 8 2    History of DVT (deep vein thrombosis)  Assessment & Plan  Continue warfarin 4 mg daily, INR therapeutic 2 2, check INR daily    Generalized weakness  Assessment & Plan  - patient has had a few days of generalized weakness, no her falls, will have PT OT evaluate, patient lives with son and is having trouble getting around the house and oldest son who was at bedside would like patient to be considered for short-term rehab    Benign hypertension with chronic kidney disease, stage III ([address] )  Assessment & Plan  Creatinine 1 17 which appears at patient's baseline, blood pressure elevated on arrival will continue to monitor, does not appear like patient is on medication for hypertension, son does not have medication list with him, will continue to monitor blood pressure    * Urinary tract infection associated with indwelling urethral catheter [facility])  Assessment & Plan  Patient has a history of a neurogenic bladder with an indwelling Smith catheter, was scheduled for urology follow-up outpatient 7/24  She has a few day history of cloudy urine, generalized weakness, and some episodes a hematuria last week  - UA showed innumerable bacteria  - creatinine 1 17, history of CKD 3  - CT of the abdomen showed hydroureternephrosis, with some bladder wall thickening and need for follow-up cystoscopy to rule out bladder cancer which was discussed with the on-call urologist, pending determination on inpatient versus outpatient workup   -will continue Rocephin 1 g Q 24 in follow-up urine culture  - in May patient had UTI with E coli and Proteus, most recent urine culture showed mixed contaminants  VTE Prophylaxis: Warfarin (Coumadin)  / sequential compression device   Code Status:  Full code  POLST: POLST form is not discussed and not completed at this time  Discussion with family:  Discussed plan with oldest son at bedside, need for oral antibiotics for urinary tract infection associated with indwelling catheter and possible inpatient urology consult  Anticipated Length of Stay:  Patient will be admitted on an Inpatient basis with an anticipated length of stay of  > 2 midnights  Justification for Hospital Stay:  Need for antibiotics for acute urinary tract infection, possible further workup by Urology for hydroureternephrosis and bladder wall thickening  Total Time for Visit, including Counseling / Coordination of Care: 30 minutes  Greater than 50% of this total time spent on direct patient counseling and coordination of care  Chief Complaint:   Generalized weakness and cloudy urine    History of Present Illness:    Jesus Daley is a 80 y o  female who presents with with a few days of generalized weakness and cloudy urine    The patient's oldest son reports that he she has not been eating and drinking as well and is generally weak, denies any falls, reports that for the past 2 days her urine has been cloudy  He also reports a few days ago she had blood in her urine  She denies any abdominal pain or fever  She does have an indwelling catheter  She follows with urology outpatient  The son reports she has regular bowel movements, although today she did not have a bowel movement  She does take iron for anemia  Review of Systems:    Review of Systems   Constitutional: Negative for fatigue and fever  HENT: Negative for congestion  Respiratory: Negative for cough and shortness of breath  Cardiovascular: Negative for chest pain and palpitations  Gastrointestinal: Negative for abdominal pain, constipation, diarrhea, nausea and vomiting  Genitourinary: Positive for hematuria  Negative for decreased urine volume and flank pain  Past Medical and Surgical History:     Past Medical History:   Diagnosis Date    Cancer ([place] [id number] )     BREAST , OVARIAN    Factor 5 Leiden mutation, heterozygous ([place] [address] )     Hypertension        Past Surgical History:   Procedure Laterality Date    BREAST SURGERY      HYSTERECTOMY         Meds/Allergies:    Prior to Admission medications    Medication Sig Start Date End Date Taking?  Authorizing Provider   albuterol (PROVENTIL HFA,VENTOLIN HFA) 90 mcg/act inhaler Inhale 2 puffs every 4 (four) hours as needed for wheezing or shortness of breath 3/11/18   Kyrie Batista MD   ascorbic acid (VITAMIN C) 500 mg tablet Take 500 mg by mouth daily    Historical Provider, MD   ferrous sulfate 325 (65 Fe) mg tablet Take 1 tablet (325 mg total) by mouth daily with breakfast 6/18/20 7/18/20  Ross Hays MD   fluticasone [facility]) 50 mcg/act nasal spray 1 spray into each nostril daily as needed for rhinitis 3/11/18   Jh Cruz MD   methenamine hippurate (HIPREX) 1 g tablet Take 1 g by mouth 2 (two) times a day with meals    Historical Provider, MD   omeprazole (PriLOSEC) 20 mg delayed release capsule Take 20 mg by mouth daily    Historical Provider, MD   potassium chloride (MICRO-K) 10 MEQ CR capsule Take 20 mEq by mouth 2 (two) times a day    Historical Provider, MD   sodium chloride 1 g tablet Take 2 tablets (2 g total) by mouth 3 (three) times a day with meals 6/17/20 7/17/20  Ross Hays MD   warfarin (COUMADIN) 4 mg tablet Take 1 tablet (4 mg total) by mouth daily Check INR Q3-4 days and dose accordingly as ordered by your PCP 6/17/20   Christella Coffin, MD   witch hazel-glycerin (TUCKS) topical pad Apply 1 pad topically every 4 (four) hours as needed for irritation 6/17/20 7/17/20  Ross Hays MD     I have reviewed home medications with patient family member  Allergies: Allergies   Allergen Reactions    Amoxicillin      Pt does not remember     Ciprofloxacin     Penicillins        Social History:     Marital Status:    Occupation: Retired  Patient Pre-hospital Living Situation: Lives with son  Patient Pre-hospital Level of Mobility: ambulates without assistance  Patient Pre-hospital Diet Restrictions:none   Substance Use History:   Social History     Substance and Sexual Activity   Alcohol Use Yes    Alcohol/week: 1 0 - 2 0 standard drinks    Types: 1 - 2 Glasses of wine per week     Social History     Tobacco Use   Smoking Status Former Smoker   Smokeless Tobacco Never Used     Social History     Substance and Sexual Activity   Drug Use No       Physical Exam   Constitutional: She is oriented to person, place, and time  She appears well-developed and well-nourished  HENT:   Oropharynx mildly dry    Eyes: Conjunctivae are normal    Neck: Neck supple  Cardiovascular: Normal rate, regular rhythm and normal heart sounds  No murmur heard  Pulmonary/Chest: Effort normal    Abdominal: Soft  She exhibits no distension and no mass  There is no tenderness     Palpable bladder    Musculoskeletal: She exhibits no edema  Neurological: She is alert and oriented to person, place, and time  Skin: Skin is warm and dry  Capillary refill takes less than 2 seconds  Psychiatric: She has a normal mood and affect  Vitals reviewed

## 2020-06-22 NOTE — ASSESSMENT & PLAN NOTE
Patient has a history of a neurogenic bladder with an indwelling Smith catheter, was scheduled for urology follow-up outpatient 7/24  She has a few day history of cloudy urine, generalized weakness, and some episodes a hematuria last week  - UA showed innumerable bacteria  - creatinine 1 17, history of CKD 3  - CT of the abdomen showed hydroureternephrosis, with some bladder wall thickening and need for follow-up cystoscopy to rule out bladder cancer which was discussed with the on-call urologist, pending determination on inpatient versus outpatient workup   -will continue Rocephin 1 g Q 24 in follow-up urine culture  - in May patient had UTI with E coli and Proteus, most recent urine culture showed mixed contaminants

## 2020-06-22 NOTE — ASSESSMENT & PLAN NOTE
- patient has had a few days of generalized weakness, no her falls, will have PT OT evaluate, patient lives with son and is having trouble getting around the house and oldest son who was at bedside would like patient to be considered for short-term rehab

## 2020-06-22 NOTE — ED PROVIDER NOTES
History  Chief Complaint   Patient presents with    Possible UTI     pt presents via EMS for c/o fever and cloudy urine  Pt has a lentz in  pt received 320tylenol  44-year-old female with history of neurogenic bladder with Lentz catheter in place, previous DVT and factor 5 Leiden on Coumadin, ovarian cancer, and hypertension presenting via EMS for evaluation of generalized weakness  Patient is a poor historian but states she has felt weak for quite some time  She also complains of being thirsty  Patient repeatedly answers "I don't know  I just feel lousy" when asked about her symptoms and medical history  She was found to have a temp of 99 5 by EMS and was given Tylenol prior to arrival  Family is concerned for a UTI  Patient was recently admitted from 6/8-6/17 after initially presenting for a possible UTI  During her admission, she was evaluated by nephrology for hyponatremia and was started on salt tabs  A Lentz catheter was inserted during the admission for neurogenic bladder which she was discharged with  She denies chest pain, shortness of breath, abdominal pain, back pain  History provided by:  Patient and medical records   used: No    Fatigue   Severity:  Moderate  Onset quality:  Gradual  Timing:  Constant  Progression:  Worsening  Chronicity:  New  Context: recent infection and urinary tract infection    Context: not stress    Relieved by:  Nothing  Worsened by:  Nothing  Ineffective treatments:  Rest  Associated symptoms: no abdominal pain, no chest pain, no cough, no diarrhea, no difficulty walking, no dizziness, no drooling, no dysuria, no numbness in extremities, no fever, no frequency, no lethargy, no nausea, no seizures, no sensory-motor deficit, no shortness of breath, no stroke symptoms, no vision change and no vomiting        Prior to Admission Medications   Prescriptions Last Dose Informant Patient Reported? Taking?    albuterol (PROVENTIL HFA,VENTOLIN HFA) 90 mcg/act inhaler   No No   Sig: Inhale 2 puffs every 4 (four) hours as needed for wheezing or shortness of breath   ascorbic acid (VITAMIN C) 500 mg tablet   Yes No   Sig: Take 500 mg by mouth daily   ferrous sulfate 325 (65 Fe) mg tablet   No No   Sig: Take 1 tablet (325 mg total) by mouth daily with breakfast   fluticasone (FLONASE) 50 mcg/act nasal spray   No No   Si spray into each nostril daily as needed for rhinitis   methenamine hippurate (HIPREX) 1 g tablet   Yes No   Sig: Take 1 g by mouth 2 (two) times a day with meals   omeprazole (PriLOSEC) 20 mg delayed release capsule   Yes No   Sig: Take 20 mg by mouth daily   potassium chloride (MICRO-K) 10 MEQ CR capsule   Yes No   Sig: Take 20 mEq by mouth 2 (two) times a day   sodium chloride 1 g tablet   No No   Sig: Take 2 tablets (2 g total) by mouth 3 (three) times a day with meals   warfarin (COUMADIN) 4 mg tablet   No No   Sig: Take 1 tablet (4 mg total) by mouth daily Check INR Q3-4 days and dose accordingly as ordered by your PCP   witch hazel-glycerin (TUCKS) topical pad   No No   Sig: Apply 1 pad topically every 4 (four) hours as needed for irritation      Facility-Administered Medications: None       Past Medical History:   Diagnosis Date    Cancer ([facility] )     BREAST , OVARIAN    Factor 5 Leiden mutation, heterozygous ([facility] )     Hypertension        Past Surgical History:   Procedure Laterality Date    BREAST SURGERY      HYSTERECTOMY         History reviewed  No pertinent family history  I have reviewed and agree with the history as documented  E-Cigarette/Vaping     E-Cigarette/Vaping Substances     Social History     Tobacco Use    Smoking status: Former Smoker    Smokeless tobacco: Never Used   Substance Use Topics    Alcohol use: Yes     Alcohol/week: 1 0 - 2 0 standard drinks     Types: 1 - 2 Glasses of wine per week    Drug use: No       Review of Systems   Constitutional: Positive for activity change and fatigue   Negative for chills and fever  HENT: Negative for congestion and drooling  Eyes: Negative for discharge and redness  Respiratory: Negative for cough and shortness of breath  Cardiovascular: Negative for chest pain and palpitations  Gastrointestinal: Negative for abdominal pain, diarrhea, nausea and vomiting  Genitourinary: Negative for dysuria, frequency and hematuria  Musculoskeletal: Negative for neck pain and neck stiffness  Skin: Negative for color change and rash  Neurological: Positive for weakness  Negative for dizziness and seizures  All other systems reviewed and are negative  Physical Exam  Physical Exam   Constitutional: She appears cachectic  She appears ill  No distress  HENT:   Head: Normocephalic and atraumatic  Right Ear: External ear normal    Left Ear: External ear normal    Nose: Nose normal    Mouth/Throat: Oropharynx is clear and moist  Mucous membranes are dry  Eyes: Conjunctivae are normal  Right eye exhibits no discharge  Left eye exhibits no discharge  No scleral icterus  Neck: Normal range of motion  Neck supple  Cardiovascular: Normal rate, regular rhythm and normal heart sounds  No murmur heard  Pulmonary/Chest: Effort normal and breath sounds normal  No stridor  No respiratory distress  She has no wheezes  She has no rales  Abdominal: Soft  Bowel sounds are normal  She exhibits no distension  There is no tenderness  There is no guarding  Genitourinary:   Genitourinary Comments: Smith catheter draining cloudy yellow urine with sediment   Musculoskeletal: Normal range of motion  She exhibits no edema or deformity  Lymphadenopathy:     She has no cervical adenopathy  Neurological: She is alert  She is not disoriented  GCS eye subscore is 4  GCS verbal subscore is 5  GCS motor subscore is 6  Answers yes/no to questioning but unable to provide specific history  No focal neurologic deficits   Skin: Skin is warm and dry  She is not diaphoretic     Psychiatric: She has a normal mood and affect  Her behavior is normal    Nursing note and vitals reviewed        Vital Signs  ED Triage Vitals [06/22/20 1026]   Temperature Pulse Respirations Blood Pressure SpO2   98 5 °F (36 9 °C) 77 19 156/74 92 %      Temp Source Heart Rate Source Patient Position - Orthostatic VS BP Location FiO2 (%)   Oral Monitor Lying Right arm --      Pain Score       --           Vitals:    06/22/20 1026 06/22/20 1140 06/22/20 1200 06/22/20 1300   BP: 156/74 (!) 182/81 163/79 165/74   Pulse: 77 67 67 65   Patient Position - Orthostatic VS: Lying            Visual Acuity      ED Medications  Medications   ceftriaxone (ROCEPHIN) 1 g/50 mL in dextrose IVPB (has no administration in time range)   sodium chloride 0 9 % bolus 1,000 mL (1,000 mL Intravenous New Bag 6/22/20 1136)       Diagnostic Studies  Results Reviewed     Procedure Component Value Units Date/Time    Urine culture [[id number]] Updated:  06/22/20 1300    Lab Status:  No result Specimen:  Urine, Indwelling Smith Catheter     Urine Microscopic [[id number]]  (Abnormal) Collected:  06/22/20 1138    Lab Status:  Final result Specimen:  Urine, Indwelling Smith Catheter Updated:  06/22/20 1242     RBC, UA 10-20 /hpf      WBC, UA Innumerable /hpf      Epithelial Cells Occasional /hpf      Bacteria, UA Innumerable /hpf     UA w Reflex to Microscopic w Reflex to Culture [[id number]]  (Abnormal) Collected:  06/22/20 1138    Lab Status:  Final result Specimen:  Urine, Indwelling Smith Catheter Updated:  06/22/20 1240     Color, UA Yellow     Clarity, UA Turbid     Specific [place], UA 1 020     pH, UA 6 5     Leukocytes, UA Large     Nitrite, UA Positive     Protein, UA 30 (1+) mg/dl      Glucose, UA Negative mg/dl      Ketones, UA Negative mg/dl      Urobilinogen, UA 0 2 E U /dl      Bilirubin, UA Negative     Blood, UA Large    Novel Coronavirus (Covid-19),PCR [facility] [[id number]]  (Normal) Collected:  06/22/20 1057    Lab Status:  Final result Specimen:  Nares from Nose Updated:  06/22/20 1157     SARS-CoV-2 Negative    Narrative: The specimen collection materials, transport medium, and/or testing methodology utilized in the production of these test results have been proven to be reliable in a limited validation with an abbreviated program under the Emergency Utilization Authorization provided by the FDA  Testing reported as "Presumptive positive" will be confirmed with secondary testing with a reference laboratory to ensure result accuracy  Clinical caution and judgement should be used with the interpretation of these results with consideration of the clinical impression and other laboratory testing  Testing reported as "Positive" or "Negative" has been proven to be accurate according to standard laboratory validation requirements  All testing is performed with control materials showing appropriate reactivity at standard intervals  TSH [[id number]]  (Normal) Collected:  06/22/20 1054    Lab Status:  Final result Specimen:  Blood from Arm, Left Updated:  06/22/20 1129     TSH 3RD GENERATON 1 299 uIU/mL     Narrative:       Patients undergoing fluorescein dye angiography may retain small amounts of fluorescein in the body for 48-72 hours post procedure  Samples containing fluorescein can produce falsely depressed TSH values  If the patient had this procedure,a specimen should be resubmitted post fluorescein clearance  Lactic acid [[id number]]  (Normal) Collected:  06/22/20 1053    Lab Status:  Final result Specimen:  Blood from Arm, Left Updated:  06/22/20 1125     LACTIC ACID 0 8 mmol/L     Narrative:       Result may be elevated if tourniquet was used during collection      Troponin I [[id number]]  (Normal) Collected:  06/22/20 1054    Lab Status:  Final result Specimen:  Blood from Arm, Left Updated:  06/22/20 1123     Troponin I <0 02 ng/mL     Comprehensive metabolic panel [[id number]]  (Abnormal) Collected:  06/22/20 1054    Lab Status:  Final result Specimen:  Blood from Arm, Left Updated:  06/22/20 1120     Sodium 132 mmol/L      Potassium 4 4 mmol/L      Chloride 102 mmol/L      CO2 22 mmol/L      ANION GAP 8 mmol/L      BUN 22 mg/dL      Creatinine 1 17 mg/dL      Glucose 89 mg/dL      Calcium 8 0 mg/dL      AST 26 U/L      ALT 23 U/L      Alkaline Phosphatase 153 U/L      Total Protein 5 9 g/dL      Albumin 2 2 g/dL      Total Bilirubin 0 50 mg/dL      eGFR 41 ml/min/1 73sq m     Narrative:       National Kidney Disease Foundation guidelines for Chronic Kidney Disease (CKD):     Stage 1 with normal or high GFR (GFR > 90 mL/min/1 73 square meters)    Stage 2 Mild CKD (GFR = 60-89 mL/min/1 73 square meters)    Stage 3A Moderate CKD (GFR = 45-59 mL/min/1 73 square meters)    Stage 3B Moderate CKD (GFR = 30-44 mL/min/1 73 square meters)    Stage 4 Severe CKD (GFR = 15-29 mL/min/1 73 square meters)    Stage 5 End Stage CKD (GFR <15 mL/min/1 73 square meters)  Note: GFR calculation is accurate only with a steady state creatinine    Protime-INR [[id number]]  (Abnormal) Collected:  06/22/20 1054    Lab Status:  Final result Specimen:  Blood from Arm, Left Updated:  06/22/20 1117     Protime 24 0 seconds      INR 2 12    APTT [[id number]]  (Abnormal) Collected:  06/22/20 1054    Lab Status:  Final result Specimen:  Blood from Arm, Left Updated:  06/22/20 1117     PTT 56 seconds     CBC and differential [[id number]]  (Abnormal) Collected:  06/22/20 1053    Lab Status:  Final result Specimen:  Blood from Arm, Left Updated:  06/22/20 1104     WBC 8 28 Thousand/uL      RBC 2 67 Million/uL      Hemoglobin 8 2 g/dL      Hematocrit 25 1 %      MCV 94 fL      MCH 30 7 pg      MCHC 32 7 g/dL      RDW 15 3 %      MPV 9 6 fL      Platelets 920 Thousands/uL      nRBC 0 /100 WBCs      Neutrophils Relative 75 %      Immat GRANS % 1 %      Lymphocytes Relative 12 %      Monocytes Relative 8 %      Eosinophils Relative 3 %      Basophils Relative 1 %      Neutrophils Absolute 6 29 Thousands/µL      Immature Grans Absolute 0 05 Thousand/uL      Lymphocytes Absolute 1 00 Thousands/µL      Monocytes Absolute 0 66 Thousand/µL      Eosinophils Absolute 0 24 Thousand/µL      Basophils Absolute 0 04 Thousands/µL     Procalcitonin [[id number]] Collected:  06/22/20 1054    Lab Status: In process Specimen:  Blood from Arm, Left Updated:  06/22/20 1102    Blood culture #2 [[id number]] Collected:  06/22/20 1054    Lab Status: In process Specimen:  Blood from Arm, Left Updated:  06/22/20 1100    Blood culture #1 [[id number]] Collected:  06/22/20 1054    Lab Status: In process Specimen:  Blood from Arm, Right Updated:  06/22/20 1100                 CT head without contrast   Final Result by Criselda Henning DO (06/22 1118)      No acute intracranial abnormality  Workstation performed: [id number]         CT chest abdomen pelvis wo contrast   Final Result by Criselda Henning DO (06/22 1208)      1  Moderate to severe right hydroureteronephrosis with  high attenuation seen within the collecting system, possibly related to hemorrhage or abnormal excretion of contrast from the right kidney related to previous CT study about 1 week ago  There is    abrupt nonvisualization of the right ureter at the UVJ with associated soft tissue fullness and bladder wall thickening  Correlate with cystoscopy regarding possible bladder neoplasm  Additionally, there is obscuration of the soft tissue planes between    the vaginal cuff and posterior right bladder wall (best seen on series 2 image 102)  The possibility of potential neoplastic involvement of this region cannot be excluded  2  Enlarged left pelvic sidewall lymph node with possible enlarging lymph nodes in the garrick hepatis and left retroperitoneum, concerning for metastases  Evaluation of adenopathy is significantly limited without IV contrast       3  No CT evidence of pneumonia  Mild bibasilar atelectasis and scattered subpleural scarring  4  Trace left pleural effusion, unchanged in retrospect  5  Stable mild mediastinal adenopathy  6  Additionally subtle findings including fatty liver, possible gallbladder sludge, colonic diverticulosis and other additional findings as detailed above  I personally discussed this study with Fran Busby on 6/22/2020 at 12:02 PM                Workstation performed: [id number]                    Procedures  ECG 12 Lead Documentation Only  Date/Time: 6/22/2020 4:00 PM  Performed by: Hollie Gray PA-C  Authorized by: Hollie Gray PA-C     Indications / Diagnosis:  Weakness  ECG reviewed by me, the ED Provider: yes    Patient location:  ED  Previous ECG:     Previous ECG:  Compared to current    Comparison ECG info:  6/8/20    Similarity:  No change  Interpretation:     Interpretation: non-specific    Rate:     ECG rate:  81    ECG rate assessment: normal    Rhythm:     Rhythm: sinus rhythm    Ectopy:     Ectopy: none    QRS:     QRS axis:  Normal  Conduction:     Conduction: normal    ST segments:     ST segments:  Normal  T waves:     T waves: non-specific and flattening               ED Course       US AUDIT      Most Recent Value   Initial Alcohol Screen: US AUDIT-C    1  How often do you have a drink containing alcohol?  0 Filed at: 06/22/2020 1025   2  How many drinks containing alcohol do you have on a typical day you are drinking? 0 Filed at: 06/22/2020 1025   3b  FEMALE Any Age, or MALE 65+: How often do you have 4 or more drinks on one occassion? 0 Filed at: 06/22/2020 1025   Audit-C Score  0 Filed at: 06/22/2020 1025              Identification of Seniors at Risk      Most Recent Value   (ISAR) Identification of Seniors at Risk   Before the illness or injury that brought you to the Emergency, did you need someone to help you on a regular basis?   0 Filed at: 06/22/2020 1026   In the last 24 hours, have you needed more help than usual?  0 Filed at: 06/22/2020 1026 Have you been hospitalized for one or more nights during the past 6 months? 0 Filed at: 06/22/2020 1026   In general, do you see well?  0 Filed at: 06/22/2020 1026   In general, do you have serious problems with your memory? 0 Filed at: 06/22/2020 1026   Do you take more than three different medications every day? 1 Filed at: 06/22/2020 1026   ISAR Score  1 Filed at: 06/22/2020 1026          NUVIA/DAST-10      Most Recent Value   How many times in the past year have you    Used an illegal drug or used a prescription medication for non-medical reasons? Never Filed at: 06/22/2020 1025                                MDM  Number of Diagnoses or Management Options  Abnormal CT of the abdomen: new and requires workup  Generalized weakness: new and requires workup  Hydroureter on right: new and requires workup  Urinary tract infection: new and requires workup  Diagnosis management comments: 81yo female with a history of neurogenic bladder, ovarian cancer, and HTN presenting for generalized weakness  Patient is a poor historian and unable to provide details  Family concerned for possible UTI  Temp was 99 5 by EMS and patient was given Tylenol  Differential diagnosis includes but is not limited to: UTI, sepsis, dehydration, RAINE, ACS, other infectious etiology, failure to thrive, cancer    Initial ED plan: Will check septic workup, UA, troponin, TSH, EKG  Check CT head given unknown baseline mental status  CT CAP to evaluate for sources of infection  Final assessment: Labs overall unremarkable  Sodium and hemoglobin at baseline  No RAINE present  UA with innumerable WBC and bacteria concerning for UTI  CT significant for right hydroureteronephrosis with abrupt cut off  Spoke with radiology regarding imaging findings, possible mass given lymphadenopathy in abdomen  Patient's son is requesting short-term rehab  Patient given a dose of IV Rocephin and admitted for further management          Amount and/or Complexity of Data Reviewed  Clinical lab tests: ordered and reviewed  Tests in the radiology section of CPT®: ordered and reviewed  Discussion of test results with the performing providers: yes  Review and summarize past medical records: yes  Discuss the patient with other providers: yes  Independent visualization of images, tracings, or specimens: yes    Risk of Complications, Morbidity, and/or Mortality  Presenting problems: high  Diagnostic procedures: high  Management options: moderate          Disposition  Final diagnoses:   Urinary tract infection   Hydroureter on right   Abnormal CT of the abdomen   Generalized weakness     Time reflects when diagnosis was documented in both MDM as applicable and the Disposition within this note     Time User Action Codes Description Comment    6/22/2020  1:33  [employer] [place] [N39 0] Urinary tract infection     6/22/2020  1:33  [employer] [place] [N13 4] Hydroureter on right     6/22/2020  1:34  [facility] [place] [R93 5] Abnormal CT of the abdomen     6/22/2020  1:34  [employer] [place] [R53 1] Generalized weakness       ED Disposition     ED Disposition Condition Date/Time Comment    Admit Stable Mon Jun 22, 2020  1:35 PM Case was discussed with Dr Tia Mcnally and the patient's admission status was agreed to be Admission Status: inpatient status to the service of Dr Tia Mcnally   Follow-up Information    None         Patient's Medications   Discharge Prescriptions    No medications on file     No discharge procedures on file      PDMP Review     None          ED Provider  Electronically Signed by           Kathleen Cobb PA-C  06/22/20 6843

## 2020-06-22 NOTE — ED NOTES
1 CC-Possible UTI (pt presents via EMS for c/o fever and cloudy urine  Pt has a lentz in  pt received 320tylenol  )  2  Orientation status-A&O x4  3  Abnormal assessment/vitals/ labs-R hydro, uti, generalized weakness, abnormal bmp  4  Medication/ drips-n/a  5  Last time narcotics given-n/a  6  Iv/drains/ etc -#20 L wrist, #20 L AC, lentz cath  7  Skin-intact  8  Ambulation status-as per son pt does not walk, assist x 1-2 to stand and pivot to chair  9  Isolation status-n/a  10  Trauma Y/N:n  11   ED phone number-[phone]       Tyrone Cardenas RN  06/22/20 3962

## 2020-06-22 NOTE — PLAN OF CARE
Problem: Prexisting or High Potential for Compromised Skin Integrity  Goal: Skin integrity is maintained or improved  Description  INTERVENTIONS:  - Identify patients at risk for skin breakdown  - Assess and monitor skin integrity  - Assess and monitor nutrition and hydration status  - Monitor labs   - Assess for incontinence   - Turn and reposition patient  - Assist with mobility/ambulation  - Relieve pressure over bony prominences  - Avoid friction and shearing  - Provide appropriate hygiene as needed including keeping skin clean and dry  - Evaluate need for skin moisturizer/barrier cream  - Collaborate with interdisciplinary team   - Patient/family teaching  - Consider wound care consult   6/22/2020 1841 by Yasmin Gonzalez RN  Outcome: Progressing  6/22/2020 1840 by Yasmin Gonzalez RN  Outcome: Progressing     Problem: Potential for Falls  Goal: Patient will remain free of falls  Description  INTERVENTIONS:  - Assess patient frequently for physical needs  -  Identify cognitive and physical deficits and behaviors that affect risk of falls    -  [place] fall precautions as indicated by assessment   - Educate patient/family on patient safety including physical limitations  - Instruct patient to call for assistance with activity based on assessment  - Modify environment to reduce risk of injury  - Consider OT/PT consult to assist with strengthening/mobility  6/22/2020 1841 by Yasmin Gonzalez RN  Outcome: Progressing  6/22/2020 1840 by Yasmin Gonzalez RN  Outcome: Progressing     Problem: PAIN - ADULT  Goal: Verbalizes/displays adequate comfort level or baseline comfort level  Description  Interventions:  - Encourage patient to monitor pain and request assistance  - Assess pain using appropriate pain scale  - Administer analgesics based on type and severity of pain and evaluate response  - Implement non-pharmacological measures as appropriate and evaluate response  - Consider cultural and social influences on pain and pain management  - Notify physician/advanced practitioner if interventions unsuccessful or patient reports new pain  Outcome: Progressing     Problem: SAFETY ADULT  Goal: Maintain or return to baseline ADL function  Description  INTERVENTIONS:  -  Assess patient's ability to carry out ADLs; assess patient's baseline for ADL function and identify physical deficits which impact ability to perform ADLs (bathing, care of mouth/teeth, toileting, grooming, dressing, etc )  - Assess/evaluate cause of self-care deficits   - Assess range of motion  - Assess patient's mobility; develop plan if impaired  - Assess patient's need for assistive devices and provide as appropriate  - Encourage maximum independence but intervene and supervise when necessary  - Involve family in performance of ADLs  - Assess for home care needs following discharge   - Consider OT consult to assist with ADL evaluation and planning for discharge  - Provide patient education as appropriate  Outcome: Progressing  Goal: Maintain or return mobility status to optimal level  Description  INTERVENTIONS:  - Assess patient's baseline mobility status (ambulation, transfers, stairs, etc )    - Identify cognitive and physical deficits and behaviors that affect mobility  - Identify mobility aids required to assist with transfers and/or ambulation (gait belt, sit-to-stand, lift, walker, cane, etc )  - [place] fall precautions as indicated by assessment  - Record patient progress and toleration of activity level on Mobility SBAR; progress patient to next Phase/Stage  - Instruct patient to call for assistance with activity based on assessment  - Consider rehabilitation consult to assist with strengthening/weightbearing, etc   Outcome: Progressing     Problem: DISCHARGE PLANNING  Goal: Discharge to home or other facility with appropriate resources  Description  INTERVENTIONS:  - Identify barriers to discharge w/patient and caregiver  - Arrange for needed discharge resources and transportation as appropriate  - Identify discharge learning needs (meds, wound care, etc )  - Arrange for interpretive services to assist at discharge as needed  - Refer to Case Management Department for coordinating discharge planning if the patient needs post-hospital services based on physician/advanced practitioner order or complex needs related to functional status, cognitive ability, or social support system  Outcome: Progressing     Problem: Knowledge Deficit  Goal: Patient/family/caregiver demonstrates understanding of disease process, treatment plan, medications, and discharge instructions  Description  Complete learning assessment and assess knowledge base    Interventions:  - Provide teaching at level of understanding  - Provide teaching via preferred learning methods  Outcome: Progressing

## 2020-06-22 NOTE — ASSESSMENT & PLAN NOTE
CT abdomen showed: Moderate to severe right hydroureteronephrosis with  high attenuation seen within the collecting system, possibly related to hemorrhage or abnormal excretion of contrast from the right kidney related to previous CT study about 1 week ago  There is abrupt nonvisualization of the right ureter at the UVJ with associated soft tissue fullness and bladder wall thickening  Correlate with cystoscopy regarding possible bladder neoplasm  Additionally, there is obscuration of the soft tissue planes between   the vaginal cuff and posterior right bladder wall (best seen on series 2 image 102)  The possibility of potential neoplastic involvement of this region cannot be excluded    - urology on-call was [place] texted to review chart to decide on inpatient versus outpatient workup including possible cystoscopy

## 2020-06-22 NOTE — ASSESSMENT & PLAN NOTE
Creatinine 1 17 which appears at patient's baseline, blood pressure elevated on arrival will continue to monitor, does not appear like patient is on medication for hypertension, son does not have medication list with him, will continue to monitor blood pressure

## 2020-06-23 LAB
ALBUMIN SERPL BCP-MCNC: 2 G/DL (ref 3.5–5)
ALP SERPL-CCNC: 147 U/L (ref 46–116)
ALT SERPL W P-5'-P-CCNC: 20 U/L (ref 12–78)
ANION GAP SERPL CALCULATED.3IONS-SCNC: 9 MMOL/L (ref 4–13)
AST SERPL W P-5'-P-CCNC: 25 U/L (ref 5–45)
BILIRUB SERPL-MCNC: 0.5 MG/DL (ref 0.2–1)
BUN SERPL-MCNC: 18 MG/DL (ref 5–25)
CALCIUM SERPL-MCNC: 7.5 MG/DL (ref 8.3–10.1)
CHLORIDE SERPL-SCNC: 102 MMOL/L (ref 100–108)
CO2 SERPL-SCNC: 21 MMOL/L (ref 21–32)
CREAT SERPL-MCNC: 0.96 MG/DL (ref 0.6–1.3)
ERYTHROCYTE [DISTWIDTH] IN BLOOD BY AUTOMATED COUNT: 15.2 % (ref 11.6–15.1)
GFR SERPL CREATININE-BSD FRML MDRD: 53 ML/MIN/1.73SQ M
GLUCOSE SERPL-MCNC: 85 MG/DL (ref 65–140)
HCT VFR BLD AUTO: 25 % (ref 34.8–46.1)
HGB BLD-MCNC: 8.1 G/DL (ref 11.5–15.4)
MCH RBC QN AUTO: 30.7 PG (ref 26.8–34.3)
MCHC RBC AUTO-ENTMCNC: 32.4 G/DL (ref 31.4–37.4)
MCV RBC AUTO: 95 FL (ref 82–98)
PLATELET # BLD AUTO: 119 THOUSANDS/UL (ref 149–390)
PMV BLD AUTO: 9.5 FL (ref 8.9–12.7)
POTASSIUM SERPL-SCNC: 3.9 MMOL/L (ref 3.5–5.3)
PROT SERPL-MCNC: 5.4 G/DL (ref 6.4–8.2)
RBC # BLD AUTO: 2.64 MILLION/UL (ref 3.81–5.12)
SODIUM SERPL-SCNC: 132 MMOL/L (ref 136–145)
WBC # BLD AUTO: 9.22 THOUSAND/UL (ref 4.31–10.16)

## 2020-06-23 PROCEDURE — 85027 COMPLETE CBC AUTOMATED: CPT | Performed by: FAMILY MEDICINE

## 2020-06-23 PROCEDURE — 97163 PT EVAL HIGH COMPLEX 45 MIN: CPT

## 2020-06-23 PROCEDURE — 97167 OT EVAL HIGH COMPLEX 60 MIN: CPT

## 2020-06-23 PROCEDURE — 80053 COMPREHEN METABOLIC PANEL: CPT | Performed by: FAMILY MEDICINE

## 2020-06-23 PROCEDURE — 99223 1ST HOSP IP/OBS HIGH 75: CPT | Performed by: NURSE PRACTITIONER

## 2020-06-23 PROCEDURE — 97129 THER IVNTJ 1ST 15 MIN: CPT

## 2020-06-23 PROCEDURE — 97130 THER IVNTJ EA ADDL 15 MIN: CPT

## 2020-06-23 PROCEDURE — 99232 SBSQ HOSP IP/OBS MODERATE 35: CPT | Performed by: FAMILY MEDICINE

## 2020-06-23 PROCEDURE — 97530 THERAPEUTIC ACTIVITIES: CPT

## 2020-06-23 RX ORDER — ECHINACEA PURPUREA EXTRACT 125 MG
1 TABLET ORAL
Status: DISCONTINUED | OUTPATIENT
Start: 2020-06-23 | End: 2020-07-03 | Stop reason: HOSPADM

## 2020-06-23 RX ADMIN — PANTOPRAZOLE SODIUM 20 MG: 20 TABLET, DELAYED RELEASE ORAL at 05:29

## 2020-06-23 RX ADMIN — Medication 2 G: at 12:37

## 2020-06-23 RX ADMIN — FERROUS SULFATE TAB 325 MG (65 MG ELEMENTAL FE) 325 MG: 325 (65 FE) TAB at 07:44

## 2020-06-23 RX ADMIN — Medication 2 G: at 07:44

## 2020-06-23 RX ADMIN — CEFTRIAXONE SODIUM 1000 MG: 10 INJECTION, POWDER, FOR SOLUTION INTRAVENOUS at 12:38

## 2020-06-23 RX ADMIN — Medication 2 G: at 18:19

## 2020-06-23 NOTE — ASSESSMENT & PLAN NOTE
Creatinine 1 17 which appears at patient's baseline, blood pressure elevated on arrival will continue to monitor, does not appear like patient is on medication for hypertension, son does not have medication list with him, will continue to monitor blood pressure    Creatinine has improved this morning to 0 96

## 2020-06-23 NOTE — PLAN OF CARE
Problem: PHYSICAL THERAPY ADULT  Goal: Performs mobility at highest level of function for planned discharge setting  See evaluation for individualized goals  Description  Treatment/Interventions: Functional transfer training, LE strengthening/ROM, Elevations, Therapeutic exercise, Endurance training, Patient/family training, Equipment eval/education, Gait training, Bed mobility, Spoke to nursing, OT  Equipment Recommended: Walker(RW)       See flowsheet documentation for full assessment, interventions and recommendations  Note:   Prognosis: Good  Problem List: Decreased strength, Decreased endurance, Impaired balance, Decreased mobility  Assessment: Pt is 80 y o  female seen for PT evaluation on 6/23/2020 s/p admit to [facility] on 6/22/2020 w/ Urinary tract infection associated with indwelling urethral catheter ([facility] [address] )  PT consulted to assess pt's functional mobility and d/c needs  Order placed for PT eval and tx, w/ up w/ A order  Performed at least 2 patient identifiers during session: Name and wristband  Comorbidities affecting pt's physical performance at time of assessment include: breast & ovarian cancer, factor 5 Leiden mutation, HTN, rib fractures, H/O DVT, neurogenic bladder, generalized weakness, hypoxia, CKD stage 3  PTA, pt was independent w/ all functional mobility w/ AD prn, ambulates household & community distances, has 3 MIKAELA and lives w/ spouse in 2 level home (optional 1st floor set up)  Personal factors affecting pt at time of IE include: inaccessible home environment, ambulating w/ assistive device, stairs to enter home, inability to ambulate household distances, inability to navigate level surfaces w/o external assistance, limited home support and positive fall history   Please find objective findings from PT assessment regarding body systems outlined above with impairments and limitations including weakness, impaired balance, decreased endurance, gait deviations, decreased activity tolerance and fall risk, as well as mobility assessment (need for cueing for mobility technique)  The following objective measures performed on IE also reveal limitations: Barthel Index: 35/100 and Modified [place]: 4 (moderate/severe disability)  Pt's clinical presentation is currently unstable/unpredictable seen in pt's presentation of abnormal lab value(s), need for input for task focus and mobility technique and ongoing medical assessment  Pt to benefit from continued PT tx to address deficits as defined above and maximize level of functional independent mobility and consistency  From PT/mobility standpoint, recommendation at time of d/c would be STR pending progress in order to facilitate return to PLOF  Barriers to Discharge: Inaccessible home environment, Decreased caregiver support     PT Discharge Recommendation: [address]     PT - OK to Discharge: Yes(when medically cleared if to STR)    See flowsheet documentation for full assessment

## 2020-06-23 NOTE — OCCUPATIONAL THERAPY NOTE
Occupational Therapy Evaluation Note        Patient Name: Destiny Galvan  Today's Date: 6/23/2020 06/23/20 0331   Note Type   Note type Eval only   Restrictions/Precautions   Weight Bearing Precautions Per Order No   Braces or Orthoses   (elastic back support prn)   Other Precautions Chair Alarm; Bed Alarm;O2;Fall Risk  (back safety precautions; log roll technique)   Pain Assessment   Pain Assessment Tool 0-10   Pain Score No Pain   Home Living   Type of Home House   Home Layout Two level;1/2 bath on main level; Other (Comment); Stairs to enter with rails  (first-floor setup available w/ bedroom and bath; 3 MIKAELA)   Bathroom Shower/Tub Tub/shower unit   Bathroom Toilet Standard   Bathroom Equipment Grab bars in shower;Grab bars around toilet   [address] Walker;Cane  (no AD at baseline; used prn)   Additional Comments 3 MIKAELA ; 9-10 stairs to 2nd floor  uses O2 at night - no chronic O2 use identified   Prior Function   Level of [place] Independent with ADLs and functional mobility; Needs assistance with IADLs   Lives With Spouse   Receives Help From Family; Other (Comment)  (Spouse and son)   ADL Assistance Independent   IADLs Needs assistance   Falls in the last 6 months 1 to 4   Vocational Retired   Comments (+) driving   Lifestyle   Autonomy Patient reports that at baseline, she is independent in ADLs and requires assistance with IADLs, including grocery shopping, which her son is currently assisting with, Patient reports that her son and spouse have currently been driving and transporting her to appointments, she states "I probably shouldn't be driving " Patient reports that she has a first-floor setup with a bedroom and full bathroom available if needed     Reciprocal Relationships Supportive family   Service to Others Retired   [address] Patient reports that she enjoys watching TV because "it's the only thing she can do" and spending time with family ADL   Eating Assistance 5  Supervision/Setup   Grooming Assistance 4  Minimal Assistance   UB Bathing Assistance 4  Minimal Assistance   LB Bathing Assistance 2  Maximal Assistance   UB Dressing Assistance 4  Minimal Assistance   LB Dressing Assistance 2  Maximal Assistance   Toileting Assistance  3  Moderate Assistance   Functional Assistance 3  Moderate Assistance   Additional Comments ADL assist levels based on pt's functional performance during OT evaluation   Bed Mobility   Rolling R 4  Minimal assistance   Additional items Assist x 1;HOB elevated; Increased time required;Verbal cues;LE management; Bedrails  (log roll)   Supine to Sit 4  Minimal assistance   Additional items Assist x 1;HOB elevated; Bedrails; Increased time required;Verbal cues;LE management  (log roll)   Additional Comments history of L rib fractures 1 month ago after mechanical fall  Patient received lying supine in bed upon OT arrival; at end of session: pt seated OOB to recliner chair w/ all needs within reach and chair alarm activated   Transfers   Sit to Stand 3  Moderate assistance   Additional items Assist x 2; Increased time required;Verbal cues   Stand to Sit 3  Moderate assistance   Additional items Assist x 2; Increased time required;Verbal cues   Additional Comments Performed w/ RW; utilized bed/chair swap technique due to pt's difficulty weight shifting on LLE   Functional Mobility   Functional Mobility   (Unable to assess)   Balance   Static Sitting Fair   Dynamic Sitting Fair -   Static Standing Poor +   Dynamic Standing Poor -   Activity Tolerance   Activity Tolerance Patient limited by fatigue;Patient limited by pain   Medical Staff Made Aware PT Melsisa Cooper; PCA Santa Blood   Nurse Made Aware RN Estefania Street confirmed pt appropriate for therapy   RUE Assessment   RUE Assessment X  (Decreased BUE, based on functional assessment)   LUE Assessment   LUE Assessment X  (Decreased BUE, based on functional assessment)   Hand Function   Gross Motor Coordination Impaired   Fine Motor Coordination Functional   Sensation   Light Touch No apparent deficits   Vision-Basic Assessment   Current Vision Wears glasses only for reading   Cognition   Overall Cognitive Status Impaired   Arousal/Participation Alert; Responsive; Cooperative   Attention Within functional limits   Orientation Level Oriented to person;Oriented to place; Disoriented to time;Oriented to situation   Memory Decreased short term memory;Decreased recall of recent events   Following Commands Follows one step commands without difficulty   Comments Pt agreeable to OT evaluation   Assessment   Limitation Decreased ADL status; Decreased UE strength;Decreased Safe judgement during ADL;Decreased cognition;Decreased endurance;Decreased self-care trans;Decreased high-level ADLs   Prognosis Good   Assessment Patient is a 80 y o  female seen for OT evaluation s/p admit to [address] [facility] on 6/22/2020 w/Urinary tract infection associated with indwelling urethral catheter ([facility] [address] )  Commorbidities affecting patient's functional performance at time of assessment include: hydroureteronephrosis, anemia, history of DVT, generalized weakness, and benign HTN with CKD stage III  Orders placed for OT evaluation and treatment  Performed at least two patient identifiers during session including name and wristband  Prior to admission, patient was living with her spouse in a two-story home with 3 MIKAELA and a first-floor setup available if needed per patient report  Patient reports that at baseline, she is independent in ADLs and requires assistance for IADLs from family, including her spouse and son  Personal factors affecting patient at time of initial evaluation include: steps to enter, limited insight into deficits, decreased initiation and engagement, difficulty performing ADLs and difficulty performing IADLs   Upon evaluation, patient requires minimal  assist for UB ADLs, maximal assist for LB ADLs, transfers with moderate assist, with the use of Rolling Walker  Ambulation deferred at this time due to patient's difficulty weight shifting on LLE Occupational performance is affected by the following deficits: orientation, decreased muscle strength, degenerative arthritic joint changes, impaired gross motor coordination, dynamic sit/ stand balance deficit with poor standing tolerance time for self care and functional mobility, decreased activity tolerance, impaired memory, impaired problem solving, decreased safety awareness and postural control and postural alignment deficit, requiring external assistance to complete transitional movements  Therapist completed  extensive additional review of medical records and additional review of physical, cognitive or psychosocial history, clinical examination identifying 5 or more performance deficits, clinical decision making of a high complexity , consistent with a high complexity level evaluation  Patient to benefit from continued Occupational Therapy treatment while in the hospital to address deficits as defined above and maximize level of functional independence with ADLs and functional mobility  Occupational Performance areas to address include:  grooming , bathing/ shower, dressing, toilet hygiene, transfer to all surfaces, functional mobility, community mobility, IADLs: safety procedures, Leisure Participation and Social participation  From OT standpoint, recommendation at time of d/c would be post-acute rehabilitation services  Goals   Patient Goals "to be myself again"   Plan   Treatment Interventions ADL retraining;Functional transfer training;UE strengthening/ROM; Endurance training;Cognitive reorientation;Patient/family training;Equipment evaluation/education; Compensatory technique education; Energy conservation; Activityengagement;Continued evaluation   Goal Expiration Date 07/07/20   OT Frequency 3-5x/wk   Recommendation   OT Discharge Recommendation Post-Acute Rehabilitation Services   Barthel Index   Feeding 10   Bathing 0   Grooming Score 5   Dressing Score 5   Bladder Score 0   Bowels Score 5   Toilet Use Score 5   Transfers (Bed/Chair) Score 5   Mobility (Level Surface) Score 0   Stairs Score 0   Barthel Index Score 35   Modified [place] Scale   Modified [place] Scale 4     Occupational Therapy Goals:    1- Patient will increase OOB/ sitting tolerance to 2-4 hours per day for increased participation in self care and leisure tasks with no s/s of exertion  2- Patient will increase standing tolerance time to 5 minutes with unilateral UE support to complete sink level ADLs@ min assist level  3- Patient will follow 100% two step directives without verbal cues  4- Patient will increase sitting tolerance at edge of bed to 20 minutes to complete UB ADLs @ min assist level  5- Patient will transfer bed to Chair / toilet with min assist with AD as indicated  6- Patient will complete UB ADLs with set up assist     7- Patient will complete LB ADLs with min assist with the use of adaptive equipment as indicated  8- Patient will complete toileting hygiene with min assist assist/ supervision for thoroughness  9-Patient/ Family will demonstrate competency with UE Home Exercise Program      10- Pt will attend to continued cognitive assessment 100% of the time in order to provide most appropriate recommendations for d/c plans      Juan Valentin, OTR/L

## 2020-06-23 NOTE — PLAN OF CARE
Problem: Prexisting or High Potential for Compromised Skin Integrity  Goal: Skin integrity is maintained or improved  Description  INTERVENTIONS:  - Identify patients at risk for skin breakdown  - Assess and monitor skin integrity  - Assess and monitor nutrition and hydration status  - Monitor labs   - Assess for incontinence   - Turn and reposition patient  - Assist with mobility/ambulation  - Relieve pressure over bony prominences  - Avoid friction and shearing  - Provide appropriate hygiene as needed including keeping skin clean and dry  - Evaluate need for skin moisturizer/barrier cream  - Collaborate with interdisciplinary team   - Patient/family teaching  - Consider wound care consult   6/23/2020 0755 by Angela Valles RN  Outcome: Progressing  6/22/2020 1841 by Angela Valles RN  Outcome: Progressing  6/22/2020 1840 by Angela Valles RN  Outcome: Progressing     Problem: Potential for Falls  Goal: Patient will remain free of falls  Description  INTERVENTIONS:  - Assess patient frequently for physical needs  -  Identify cognitive and physical deficits and behaviors that affect risk of falls    -  [place] fall precautions as indicated by assessment   - Educate patient/family on patient safety including physical limitations  - Instruct patient to call for assistance with activity based on assessment  - Modify environment to reduce risk of injury  - Consider OT/PT consult to assist with strengthening/mobility  6/23/2020 0755 by Angela Valles RN  Outcome: Progressing  6/22/2020 1841 by Angela Valles RN  Outcome: Progressing  6/22/2020 1840 by Angela Valles RN  Outcome: Progressing     Problem: PAIN - ADULT  Goal: Verbalizes/displays adequate comfort level or baseline comfort level  Description  Interventions:  - Encourage patient to monitor pain and request assistance  - Assess pain using appropriate pain scale  - Administer analgesics based on type and severity of pain and evaluate response  - Implement non-pharmacological measures as appropriate and evaluate response  - Consider cultural and social influences on pain and pain management  - Notify physician/advanced practitioner if interventions unsuccessful or patient reports new pain  6/23/2020 0755 by Rachel Yin RN  Outcome: Progressing  6/22/2020 1841 by Rachel Yin RN  Outcome: Progressing     Problem: SAFETY ADULT  Goal: Maintain or return to baseline ADL function  Description  INTERVENTIONS:  -  Assess patient's ability to carry out ADLs; assess patient's baseline for ADL function and identify physical deficits which impact ability to perform ADLs (bathing, care of mouth/teeth, toileting, grooming, dressing, etc )  - Assess/evaluate cause of self-care deficits   - Assess range of motion  - Assess patient's mobility; develop plan if impaired  - Assess patient's need for assistive devices and provide as appropriate  - Encourage maximum independence but intervene and supervise when necessary  - Involve family in performance of ADLs  - Assess for home care needs following discharge   - Consider OT consult to assist with ADL evaluation and planning for discharge  - Provide patient education as appropriate  6/23/2020 0755 by Rachel Yin RN  Outcome: Progressing  6/22/2020 1841 by Rachel Yin RN  Outcome: Progressing  Goal: Maintain or return mobility status to optimal level  Description  INTERVENTIONS:  - Assess patient's baseline mobility status (ambulation, transfers, stairs, etc )    - Identify cognitive and physical deficits and behaviors that affect mobility  - Identify mobility aids required to assist with transfers and/or ambulation (gait belt, sit-to-stand, lift, walker, cane, etc )  - [place] fall precautions as indicated by assessment  - Record patient progress and toleration of activity level on Mobility SBAR; progress patient to next Phase/Stage  - Instruct patient to call for assistance with activity based on assessment  - Consider rehabilitation consult to assist with strengthening/weightbearing, etc   6/23/2020 0755 by Orrin Pallas, RN  Outcome: Progressing  6/22/2020 1841 by Orrin Pallas, RN  Outcome: Progressing     Problem: DISCHARGE PLANNING  Goal: Discharge to home or other facility with appropriate resources  Description  INTERVENTIONS:  - Identify barriers to discharge w/patient and caregiver  - Arrange for needed discharge resources and transportation as appropriate  - Identify discharge learning needs (meds, wound care, etc )  - Arrange for interpretive services to assist at discharge as needed  - Refer to Case Management Department for coordinating discharge planning if the patient needs post-hospital services based on physician/advanced practitioner order or complex needs related to functional status, cognitive ability, or social support system  6/23/2020 0755 by Orrin Pallas, RN  Outcome: Progressing  6/22/2020 1841 by Orrin Pallas, RN  Outcome: Progressing     Problem: Knowledge Deficit  Goal: Patient/family/caregiver demonstrates understanding of disease process, treatment plan, medications, and discharge instructions  Description  Complete learning assessment and assess knowledge base  Interventions:  - Provide teaching at level of understanding  - Provide teaching via preferred learning methods  6/23/2020 0755 by Orrin Pallas, RN  Outcome: Progressing  6/22/2020 1841 by Orrin Pallas, RN  Outcome: Progressing     Problem: Nutrition/Hydration-ADULT  Goal: Nutrient/Hydration intake appropriate for improving, restoring or maintaining nutritional needs  Description  Monitor and assess patient's nutrition/hydration status for malnutrition  Collaborate with interdisciplinary team and initiate plan and interventions as ordered  Monitor patient's weight and dietary intake as ordered or per policy  Utilize nutrition screening tool and intervene as necessary   Determine patient's food preferences and provide high-protein, high-caloric foods as appropriate       INTERVENTIONS:  - Monitor oral intake, urinary output, labs, and treatment plans  - Assess nutrition and hydration status and recommend course of action  - Evaluate amount of meals eaten  - Assist patient with eating if necessary   - Allow adequate time for meals  - Recommend/ encourage appropriate diets, oral nutritional supplements, and vitamin/mineral supplements  - Order, calculate, and assess calorie counts as needed  - Recommend, monitor, and adjust tube feedings and TPN/PPN based on assessed needs  - Assess need for intravenous fluids  - Provide specific nutrition/hydration education as appropriate  - Include patient/family/caregiver in decisions related to nutrition  Outcome: Progressing

## 2020-06-23 NOTE — CONSULTS
UROLOGY CONSULTATION NOTE     Patient Identifiers: Shree Beaver (MRN [id number])  Service Requesting Consultation: [facility]  Service Providing Consultation:  Urology, BRODERICK Omalley    Date of Service: 6/23/2020  Inpatient consult to Urology  Consult performed by: BRODERICK Omalley  Consult ordered by: Shravan Bonilla MD          Reason for Consultation:  Severe right hydronephrosis with potential bladder neoplasm    ASSESSMENT:     80 y o  old female with  history of  breast and ovarian malignancy, neurogenic bladder, CIC, recurrent UTI, prior gross hematuria and right hydronephrosis of unclear etiology  No stones appreciated on imaging, but multiple renal cysts  There is soft tissue fullness at the right UVJ with bladder wall thickening and suspicious pelvic regional lymphadenopathy  PLAN:   1  Continue medical optimization and empiric antibiotics  2  Narrow per sensitivities  3  Hold Coumadin/anticoagulation  4  Allow INR to drift  5  Will consider cystoscopic examination for diagnostic purposes, given patient's past oncologic history once INR is safe to proceed and patient is has several doses of antibiotics  6  Patient may require palliative consultation to establish goals of care  History of Present Illness:     Shree Beaver is a 80 y o  old female with unclear urologic history seen 06/10/2020 in-hospital consultation for UTI  At the time, patient could not recall urologist name but states that several years ago she was instructed to perform CIC daily for management of neurogenic bladder  She received empiric ceftriaxone for several days while hospitalized and final urine culture was negative for growth; therefore, antibiotics were discontinued at time of discharge  She was scheduled for urologic evaluation and to discuss long-term elimination management within the next few weeks  Unfortunately she now re-presents to [facility] ER with generalized weakness and cloudy urine    She is afebrile, hemodynamically stable without leukocytosis or acute kidney injury  Ms Valerio Pang denies flank, abdominal, groin or suprapubic pain, dysuria or gross hematuria  Of note, patient is chronically anticoagulated, on Coumadin with current INR of 2 12   CT of the abdomen shows moderate to severe right hydroureteronephrosis with  high attenuation seen within the collecting system, possibly related to hemorrhage or abnormal excretion of contrast from the right kidney   There is abrupt nonvisualization of the right ureter at the UVJ with associated soft tissue fullness and bladder wall thickening  Additionally, there is obscuration of the soft tissue planes between the vaginal cuff and posterior right bladder wall and regional lymph adenopathy  Patient relays personal history of breast and ovarian cancer  Our service was contacted for urologic consultation in light of CT findings for further management recommendations      Past Medical, Past Surgical History:     Past Medical History:   Diagnosis Date    Cancer ([place] [id number] )     BREAST , OVARIAN    Factor 5 Leiden mutation, heterozygous ([place] [id number] )     Hypertension    :    Past Surgical History:   Procedure Laterality Date    BREAST SURGERY      HYSTERECTOMY     :    Medications, Allergies:     Current Facility-Administered Medications   Medication Dose Route Frequency    acetaminophen (TYLENOL) tablet 650 mg  650 mg Oral Q6H PRN    albuterol (PROVENTIL HFA,VENTOLIN HFA) inhaler 2 puff  2 puff Inhalation Q4H PRN    cefTRIAXone (ROCEPHIN) 1,000 mg in dextrose 5 % 50 mL IVPB  1,000 mg Intravenous Q24H    ferrous sulfate tablet 325 mg  325 mg Oral Daily With Breakfast    fluticasone (FLONASE) 50 mcg/act nasal spray 1 spray  1 spray Nasal Daily PRN    pantoprazole (PROTONIX) EC tablet 20 mg  20 mg Oral Early Morning    polyethylene glycol (MIRALAX) packet 17 g  17 g Oral Daily PRN    sodium chloride (OCEAN) 0 65 % nasal spray 1 spray  1 spray Each Nare Q1H PRN    sodium chloride tablet 2 g  2 g Oral TID With Meals       Allergies: Allergies   Allergen Reactions    Amoxicillin      Pt does not remember     Ciprofloxacin     Penicillins    :    Social and Family History:   Social History:   Social History     Tobacco Use    Smoking status: Former Smoker    Smokeless tobacco: Never Used   Substance Use Topics    Alcohol use: Yes     Alcohol/week: 1 0 - 2 0 standard drinks     Types: 1 - 2 Glasses of wine per week    Drug use: No        Social History     Tobacco Use   Smoking Status Former Smoker   Smokeless Tobacco Never Used       Family History:  History reviewed  No pertinent family history :     Review of Systems:   Review of Systems - History obtained from chart review and the patient  General ROS: positive for  - malaise  Respiratory ROS: no cough, shortness of breath, or wheezing  Cardiovascular ROS: no chest pain or dyspnea on exertion  Gastrointestinal ROS: no abdominal pain, change in bowel habits, or black or bloody stools  Genito-Urinary ROS: no dysuria, trouble voiding, or hematuria  Neurological ROS: no TIA or stroke symptoms    All other systems queried were negative  Physical Exam:     /75 (BP Location: Right arm)   Pulse 81   Temp 97 9 °F (36 6 °C) (Oral)   Resp 18   Ht 5' (1 524 m)   Wt 61 kg (134 lb 7 7 oz)   SpO2 90%   BMI 26 26 kg/m² Temp (24hrs), Av 4 °F (36 9 °C), Min:97 9 °F (36 6 °C), Max:99 3 °F (37 4 °C)  current; Temperature: 97 9 °F (36 6 °C)  I/O last 24 hours:   In: 2531 5 [P O :540; I V :941 5; IV Piggyback:1050]  Out: 500 [Urine:500]    General appearance: alert and oriented, in no acute distress, appears stated age, cooperative and no distress  Head: Normocephalic, without obvious abnormality, atraumatic  Neck: no adenopathy, no carotid bruit, no JVD, supple, symmetrical, trachea midline and thyroid not enlarged, symmetric, no tenderness/mass/nodules  Lungs: diminished breath sounds  Heart: regular rate and rhythm, S1, S2 normal, no murmur, click, rub or gallop  Abdomen: soft, non-tender; bowel sounds normal; no masses,  no organomegaly  Extremities: extremities normal, warm and well-perfused; no cyanosis, clubbing, or edema  Pulses: 2+ and symmetric  Neurologic: Grossly normal  Smith catheter patent for clear chucky urine    Labs:     Lab Results   Component Value Date    HGB 8 1 (L) 06/23/2020    HCT 25 0 (L) 06/23/2020    WBC 9 22 06/23/2020     (L) 06/23/2020   ]    Lab Results   Component Value Date    K 3 9 06/23/2020     06/23/2020    CO2 21 06/23/2020    BUN 18 06/23/2020    CREATININE 0 96 06/23/2020    CALCIUM 7 5 (L) 06/23/2020   ]    Imaging:   I personally reviewed the images and report of the following studies, and reviewed them with the patient:    CT Abdomen: See below  CT chest abdomen pelvis wo contrast [[id number]] Collected: 06/22/20 1119   Order Status: Completed Updated: 06/22/20 1209   Narrative:     CT CHEST, ABDOMEN AND PELVIS WITHOUT IV CONTRAST    INDICATION:  Sepsis  COMPARISON:  CT chest 6/15/2020, CT chest, abdomen and [facility]16/[facility]    TECHNIQUE: CT examination of the chest, abdomen and pelvis was performed without intravenous contrast   Axial, sagittal, and coronal 2D reformatted images were created from the source data and submitted for interpretation  Radiation dose length product (DLP) for this visit: 42-71-89-64 mGy-cm   This examination, like all CT scans [facility], was performed utilizing techniques to minimize radiation dose exposure, including the use of iterative   reconstruction and automated exposure control  Enteric contrast was not administered  FINDINGS: The study is limited without IV contrast     CHEST    LUNGS:  Mild dependent bibasilar atelectasis and patchy subpleural atelectatic changes or scarring  Lung aeration is similar  No convincing evidence of pneumonia      Stable 9 mm smoothly marginated nodule in the right middle lobe again noted and chronically unchanged from July 2015  This is almost certainly benign  No endobronchial lesions  PLEURA:  Tiny left pleural effusion  HEART/GREAT VESSELS:  The heart is top normal size  Visualization of the intraventricular septum suggesting anemia  Atherosclerotic changes thoracic aorta and coronary arteries  The main pulmonary artery is dilated which may be indicative of pulmonary   artery hypertension  MEDIASTINUM AND MARYURI:  Mild prevascular adenopathy, similar  CHEST WALL AND LOWER NECK:   Unremarkable  ABDOMEN  Evaluation of the solid organs is limited without IV contrast     LIVER/BILIARY TREE:  Fatty liver infiltration  GALLBLADDER:  No calcified gallstones  Cannot exclude gallbladder sludge  No pericholecystic inflammatory change  SPLEEN:  Unremarkable  PANCREAS:  Unremarkable  ADRENAL GLANDS:  Unremarkable  KIDNEYS/URETERS:    There is moderate to severe right hydroureteronephrosis with high attenuation seen within the collecting system, possibly related to hemorrhage or abnormal excretion of contrast from the right kidney related to previous CT study about 1 week ago  There   is abrupt nonvisualization of the right ureter at the UVJ with associated soft tissue fullness/bladder wall thickening  Additionally, there is obscuration of the soft tissue planes between the vaginal cuff and posterior right bladder wall (best seen on   series 2 image 102)  The possibility of potential neoplastic involvement of this region cannot be excluded  Small hyperdense cysts are suggested in the right mid to lower pole cortex  2 6 cm left interpolar parapelvic cyst with extrarenal pelvis  STOMACH AND BOWEL:    Evaluation of the bowel is limited without oral contrast    The stomach is poorly distended, evaluation limited  Small bowel is normal caliber  Colonic diverticulosis without evidence of diverticulitis      APPENDIX:  No findings to suggest appendicitis  ABDOMINOPELVIC CAVITY:    No pneumoperitoneum  There is a tiny amount of free fluid in the pelvis  Limited evaluation of adenopathy without IV contrast  Question enlarged 2 4 x 1 3 cm garrick hepatis lymph node series 2/62  Volume averaging with tortuous portal vein cannot be completely excluded  2 1 x 2 0 cm left pelvic sidewall lymph node series 2/93, previously measured about 8 x 8 mm on the July 2015 exam  Additional bilateral external iliac chain adenopathy suggested, again suboptimally evaluated without IV contrast     Left para-aortic adenopathy suspected measuring about 9 mm short axis series 2/74, increased from previous study  VESSELS:  Unremarkable for patient's age  PELVIS    REPRODUCTIVE ORGANS:  Status post hysterectomy  URINARY BLADDER:  The urinary bladder is partially collapsed by Smith catheter with intraluminal air and diffuse wall thickening, more pronounced posteriorly and on the right compared to the left  Underlying neoplasm cannot be excluded  Extensive bladder diverticula were noted on previous study, probably present on the current exam along the upper posterior bladder dome such as series 3 image 93 but better visualized on sagittal imaging  ABDOMINAL WALL/INGUINAL REGIONS:  Subcutaneous edema seen along the midline subcutaneous back fat  Ventral abdominal wall scarring  OSSEOUS STRUCTURES:  No acute fracture or destructive osseous lesion  Degenerative changes of the spine and pubic symphysis  Grade 1-2 anterolisthesis L5 on S1, likely related to L5 pars defects  Healing fractures of the left anterolateral third through sixth ribs  Impression:       1  Moderate to severe right hydroureteronephrosis with  high attenuation seen within the collecting system, possibly related to hemorrhage or abnormal excretion of contrast from the right kidney related to previous CT study about 1 week ago   There is   abrupt nonvisualization of the right ureter at the UVJ with associated soft tissue fullness and bladder wall thickening  Correlate with cystoscopy regarding possible bladder neoplasm  Additionally, there is obscuration of the soft tissue planes between   the vaginal cuff and posterior right bladder wall (best seen on series 2 image 102)  The possibility of potential neoplastic involvement of this region cannot be excluded  2  Enlarged left pelvic sidewall lymph node with possible enlarging lymph nodes in the garrick hepatis and left retroperitoneum, concerning for metastases  Evaluation of adenopathy is significantly limited without IV contrast     3  No CT evidence of pneumonia  Mild bibasilar atelectasis and scattered subpleural scarring  4  Trace left pleural effusion, unchanged in retrospect  5  Stable mild mediastinal adenopathy  6  Additionally subtle findings including fatty liver, possible gallbladder sludge, colonic diverticulosis and other additional findings as detailed above      I personally discussed this study with DUNG Maddox on 6/22/2020 at 12:02 PM           Workstation performed: [id number]             Thank you for allowing me to participate in this patients care  Please do not hesitate to call with any additional questions    BRODERICK Alston

## 2020-06-23 NOTE — OCCUPATIONAL THERAPY NOTE
Occupational Therapy Cognitive Evaluation        Patient Name: Felicitas Jarrell  Today's Date: 6/23/2020    Pt engaged in [place] Cognitive Assessment (MoCA) version 1  Pt scored overall 13/30 indicative of moderate neurocognitive impairments  Pt noted w/ area of deficit in alternating trail making scoring 4/5 points  Pt able to ID 2/3 familiar animals scoring 2/3 points  Pt able to recall 5/5 words for STM/immediate recall, and able to repeat 5 digits in forward order and 3 digits in reverse order scoring 2/2 points  Pt scored 0/1 points in area of attention/concentration and reaction w/ difficulty tapping on each letter "A" amidst a long list of letters  Pt noted able to perform serial seven subtraction scoring 3/3 points w/ ability to  correclty complete 4 subtraction problems  Pt scored 1/2 points for sentence repetition w/ difficulty w/ complex phrases  Pt identified only 8 words that begin with the letter "F", scoring 0/1 points in the area of word finding/language fluency  Pt with difficulty identifying similarities between two words, scoring 0/2 points in the area of abstract/ correlational  thinking  Pt able to recall 0 words post 2 minute delay in area of delayed recall scoring 0/5 points  Pt responded incorrectly to all orientation questions, including date/ month/ year/ day/ place /city, scoring 0/6 points in area of orientation  Pt educated on functional implications and meaning of MoCA score, further education deferred until caregiver present to receive education about compensatory strategies to utilize with patient in the home environment  Patient give verbal permission for OT to speak with Owen medina on the phone during OT session who requested to speak with therapist  Owen Wilkins, informed about activities performed during session  MARVIN Walker made aware of adam Ortega's request to speak with her about patient updates/status      Updated/Additional OT Goals:    1- Patient will demonstrate improved sustained attention skills by participating in a functional task for at least 10 minutes with min VCs for redirection  2- Patient will demonstrate improved abstraction skills by identifying the common purpose/use of 3 sets of at least 2 self-care tools (comb/shampoo/conditioner, toothbrush/toothpaste, washcloth/soap, etc ) in order to increase independence and performance in ADLs      Brenna Cervantes, OTR/L

## 2020-06-23 NOTE — PROGRESS NOTES
Progress Note - Thera Res 9/20/1930, 80 y o  female MRN: [id number]    Unit/Bed#: -01 Encounter: [id number]    Primary Care Provider: Fabián Young DO   Date and time admitted to hospital: 6/22/2020 10:22 AM        Hydroureteronephrosis  Assessment & Plan  CT abdomen showed: Moderate to severe right hydroureteronephrosis with  high attenuation seen within the collecting system, possibly related to hemorrhage or abnormal excretion of contrast from the right kidney related to previous CT study about 1 week ago  There is abrupt nonvisualization of the right ureter at the UVJ with associated soft tissue fullness and bladder wall thickening  Correlate with cystoscopy regarding possible bladder neoplasm  Additionally, there is obscuration of the soft tissue planes between   the vaginal cuff and posterior right bladder wall (best seen on series 2 image 102)  The possibility of potential neoplastic involvement of this region cannot be excluded  Follow with urology recommendations later    Anemia  Assessment & Plan  - history of anemia, takes oral iron daily, hemoglobin 8 2  Hemoglobin is stable in the low 8    History of DVT (deep vein thrombosis)  Assessment & Plan  Hold Coumadin today for potential cystoscopy    Generalized weakness  Assessment & Plan  - patient has had a few days of generalized weakness, no her falls, will have PT OT evaluate, patient lives with son and is having trouble getting around the house and oldest son who was at bedside would like patient to be considered for short-term rehab    Benign hypertension with chronic kidney disease, stage III ([address] )  Assessment & Plan  Creatinine 1 17 which appears at patient's baseline, blood pressure elevated on arrival will continue to monitor, does not appear like patient is on medication for hypertension, son does not have medication list with him, will continue to monitor blood pressure    Creatinine has improved this morning to 0 96    * Urinary tract infection associated with indwelling urethral catheter [facility])  Assessment & Plan  Patient has a history of a neurogenic bladder with an indwelling Smith catheter, was scheduled for urology follow-up outpatient   She has a few day history of cloudy urine, generalized weakness, and some episodes a hematuria last week  - UA showed innumerable bacteria  - creatinine 1 17, history of CKD 3  - CT of the abdomen showed hydroureternephrosis, with some bladder wall thickening and need for follow-up cystoscopy to rule out bladder cancer  Urology will evaluate the patient this afternoon  They asked to keep the patient off the Coumadin for now just in case anything as planned later this week  -will continue Rocephin 1 g Q 24 in follow-up urine culture  - in May patient had UTI with E coli and Proteus, most recent urine culture showed mixed contaminants  VTE Pharmacologic Prophylaxis:   Pharmacologic: Warfarin (Coumadin)  Mechanical VTE Prophylaxis in Place: Yes    Patient Centered Rounds: I have performed bedside rounds with nursing staff today  Discussions with Specialists or Other Care Team Provider:  urology    Education and Discussions with Family / Patient: Will call the son    Time Spent for Care: 20 minutes  More than 50% of total time spent on counseling and coordination of care as described above  Current Length of Stay: 1 day(s)    Current Patient Status: Inpatient   Certification Statement: The patient will continue to require additional inpatient hospital stay due to Needing IV antibiotics and possible cystoscopies for severe hydronephrosis    Discharge Plan:  Discharge hopefully in the next 48 hours    Code Status: Level 1 - Full Code      Subjective:   Patient seen examined    She is pleasantly confused    Objective:     Vitals:   Temp (24hrs), Av 4 °F (36 9 °C), Min:97 9 °F (36 6 °C), Max:99 3 °F (37 4 °C)    Temp:  [97 9 °F (36 6 °C)-99 3 °F (37 4 °C)] 97 9 °F (36 6 °C)  HR:  [62-81] 81  Resp:  [18-25] 18  BP: (152-184)/(68-92) 155/75  SpO2:  [89 %-98 %] 90 %  Body mass index is 26 26 kg/m²  Input and Output Summary (last 24 hours): Intake/Output Summary (Last 24 hours) at 6/23/2020 1131  Last data filed at 6/23/2020 0900  Gross per 24 hour   Intake 2331 5 ml   Output 500 ml   Net 1831 5 ml       Physical Exam:     Physical Exam  (   General Appearance:    Alert, cooperative, no distress, appears stated age                               Lungs:     Clear to auscultation bilaterally, respirations unlabored       Heart:    Regular rate and rhythm, S1 and S2 normal, no murmur, rub    or gallop   Abdomen:     Soft, non-tender, bowel sounds active all four quadrants,     no masses, no organomegaly           Extremities:   Extremities normal, atraumatic, no cyanosis or edema     Additional Data:     Labs:    Results from last 7 days   Lab Units 06/23/20  0637 06/22/20  1053   WBC Thousand/uL 9 22 8 28   HEMOGLOBIN g/dL 8 1* 8 2*   HEMATOCRIT % 25 0* 25 1*   PLATELETS Thousands/uL 119* 123*   NEUTROS PCT %  --  75   LYMPHS PCT %  --  12*   MONOS PCT %  --  8   EOS PCT %  --  3     Results from last 7 days   Lab Units 06/23/20  0637   SODIUM mmol/L 132*   POTASSIUM mmol/L 3 9   CHLORIDE mmol/L 102   CO2 mmol/L 21   BUN mg/dL 18   CREATININE mg/dL 0 96   ANION GAP mmol/L 9   CALCIUM mg/dL 7 5*   ALBUMIN g/dL 2 0*   TOTAL BILIRUBIN mg/dL 0 50   ALK PHOS U/L 147*   ALT U/L 20   AST U/L 25   GLUCOSE RANDOM mg/dL 85     Results from last 7 days   Lab Units 06/22/20  1054   INR  2 12*             Results from last 7 days   Lab Units 06/22/20  1054 06/22/20  1053   LACTIC ACID mmol/L  --  0 8   PROCALCITONIN ng/ml 0 14  --            * I Have Reviewed All Lab Data Listed Above  * Additional Pertinent Lab Tests Reviewed:  All [facility] Admission Reviewed      Recent Cultures (last 7 days):     Results from last 7 days   Lab Units 06/22/20  1054   BLOOD CULTURE Received in Microbiology Lab  Culture in Progress  Received in Microbiology Lab  Culture in Progress  Last 24 Hours Medication List:     Current Facility-Administered Medications:  acetaminophen 650 mg Oral Q6H PRN Christina Found, DO   albuterol 2 puff Inhalation Q4H PRN Christina Found, DO   cefTRIAXone 1,000 mg Intravenous Q24H Christina Found, DO   ferrous sulfate 325 mg Oral Daily With Breakfast Celiaairam Piña, DO   fluticasone 1 spray Nasal Daily PRN Christina Found, DO   pantoprazole 20 mg Oral Early Morning Celia Ayana, DO   polyethylene glycol 17 g Oral Daily PRN Christina Found, DO   sodium chloride 1 spray Each Nare Q1H PRN Marjan Villalobos MD   sodium chloride 2 g Oral TID With Meals Christina Found, DO        Today, Patient Was Seen By: Marjan Villalobos MD    ** Please Note: Dictation voice to text software may have been used in the creation of this document   **

## 2020-06-23 NOTE — QUICK NOTE
I did speak to the patient's daughter-in-law  Did give her an update  Did tell her urology will be coming by to see her    We are holding the Coumadin for now but tomorrow will likely need to bridge if it drops below 2 because the patient does have a history of factor 5 Leiden deficiency

## 2020-06-23 NOTE — PHYSICAL THERAPY NOTE
Physical Therapy Evaluation     Patient's Name: Fatemeh Barcenas    Admitting Diagnosis  UTI (urinary tract infection) [N39 0]  Urinary tract infection [N39 0]  Abnormal CT of the abdomen [R93 5]  Hydroureter on right [N13 4]  Generalized weakness [R53 1]    Problem List  Patient Active Problem List   Diagnosis    Mixed dyslipidemia    Benign hypertension with chronic kidney disease, stage III ([place] [address] )    CKD (chronic kidney disease) stage 3, GFR 30-59 ml/min ([facility])    UTI (urinary tract infection)    Hypoxia    Generalized weakness    Neurogenic bladder    History of DVT (deep vein thrombosis)    Rib fractures    Anemia    Subtherapeutic international normalized ratio (INR)    RAINE (acute kidney injury) ([place] [address] )    Hyponatremia    Hyperkalemia    Hematuria    Urinary tract infection associated with indwelling urethral catheter ([place] [address] )    Hydroureteronephrosis       Past Medical History  Past Medical History:   Diagnosis Date    Cancer ([place] [address] )     BREAST , OVARIAN    Factor 5 Leiden mutation, heterozygous ([place] [address] )     Hypertension        Past Surgical History  Past Surgical History:   Procedure Laterality Date    BREAST SURGERY      HYSTERECTOMY          06/23/20 0850   Note Type   Note type Eval/Treat   Pain Assessment   Pain Assessment Tool 0-10   Pain Score No Pain   Home Living   Type of [address] [place] [address] Two level;1/2 bath on main level; Other (Comment); Stairs to enter with rails  (first-floor setup available w/ bedroom and bath; 3 MIKAELA)   Bathroom Shower/Tub Tub/shower unit   Bathroom Toilet Standard   Bathroom Equipment Grab bars in shower;Grab bars around toilet   [address] Walker;Cane  (no AD at baseline; used prn)   Additional Comments 3 MIKAELA ; 9-10 stairs to 2nd floor  uses O2 at night - no chronic O2 use identified   Prior Function   Level of [place] Independent with ADLs and functional mobility; Needs assistance with IADLs   Lives With Spouse Receives Help From Family; Other (Comment)  (Spouse and son)   ADL Assistance Independent   IADLs Needs assistance   Falls in the last 6 months 1 to 4   Vocational Retired   Restrictions/Precautions   Wells [place] Bearing Precautions Per Order No   Braces or Orthoses   (elastic back support prn)   Cognition   Overall Cognitive Status Impaired   Attention Within functional limits   Orientation Level Oriented to person;Oriented to place; Disoriented to time;Oriented to situation   Memory Decreased short term memory;Decreased recall of recent events   Following Commands Follows one step commands without difficulty   RUE Assessment   RUE Assessment X  (defer to OT eval for comments)   LUE Assessment   LUE Assessment X  (defer to OT eval for comments)   RLE Assessment   RLE Assessment X  (grossly 3/5)   LLE Assessment   LLE Assessment X  (grossly 3/5)   Coordination   Movements are Fluid and Coordinated 1   Sensation WFL   Bed Mobility   Rolling R 4  Minimal assistance   Additional items Assist x 1;HOB elevated; Increased time required;Verbal cues;LE management; Bedrails  (log roll)   Supine to Sit 4  Minimal assistance   Additional items Assist x 1;HOB elevated; Increased time required;Verbal cues;LE management; Bedrails  (log roll)   Additional Comments history of L rib fractures 1 month ago after mechanical fall  Patient received lying supine in bed upon PT arrival; at end of session: pt seated OOB to recliner chair w/ all needs within reach and chair alarm activated   Transfers   Sit to Stand 3  Moderate assistance   Additional items Assist x 2; Increased time required;Verbal cues   Stand to Sit 3  Moderate assistance   Additional items Assist x 2; Increased time required;Verbal cues   Ambulation/Elevation   Gait pattern Decreased foot clearance; Short stride; Step to;Excessively slow   Gait Assistance 3  Moderate assist   Additional items Assist x 2;Verbal cues; Tactile cues   Assistive Device Rolling walker   Distance 1' lateral side step   Stair Management Assistance Not tested   Balance   Static Sitting Fair   Dynamic Sitting Fair -   Static Standing Poor +   Dynamic Standing Poor   Ambulatory Poor   Endurance Deficit   Endurance Deficit Yes   Activity Tolerance   Activity Tolerance Patient limited by fatigue;Patient limited by pain   Medical Staff Made Aware KELLI Edmonds re activity order   Nurse Made Aware MARVIN Ly confirmed pt appropriate for therapy   Assessment   Prognosis Good   Problem List Decreased strength;Decreased endurance; Impaired balance;Decreased mobility   Assessment Pt is 80 y o  female seen for PT evaluation on 6/23/2020 s/p admit to [facility] on 6/22/2020 w/ Urinary tract infection associated with indwelling urethral catheter ([facility] [address] )  PT consulted to assess pt's functional mobility and d/c needs  Order placed for PT eval and tx, w/ up w/ A order  Performed at least 2 patient identifiers during session: Name and wristband  Comorbidities affecting pt's physical performance at time of assessment include: breast & ovarian cancer, factor 5 Leiden mutation, HTN, rib fractures, H/O DVT, neurogenic bladder, generalized weakness, hypoxia, CKD stage 3  PTA, pt was independent w/ all functional mobility w/ AD prn, ambulates household & community distances, has 3 MIKAELA and lives w/ spouse in 2 level home (optional 1st floor set up)  Personal factors affecting pt at time of IE include: inaccessible home environment, ambulating w/ assistive device, stairs to enter home, inability to ambulate household distances, inability to navigate level surfaces w/o external assistance, limited home support and positive fall history   Please find objective findings from PT assessment regarding body systems outlined above with impairments and limitations including weakness, impaired balance, decreased endurance, gait deviations, decreased activity tolerance and fall risk, as well as mobility assessment (need for cueing for mobility technique)  The following objective measures performed on IE also reveal limitations: Barthel Index: 35/100 and Modified [place]: 4 (moderate/severe disability)  Pt's clinical presentation is currently unstable/unpredictable seen in pt's presentation of abnormal lab value(s), need for input for task focus and mobility technique and ongoing medical assessment  Pt to benefit from continued PT tx to address deficits as defined above and maximize level of functional independent mobility and consistency  From PT/mobility standpoint, recommendation at time of d/c would be STR pending progress in order to facilitate return to PLOF  Barriers to Discharge Inaccessible home environment;Decreased caregiver support   Goals   Patient Goals "to be myself again"   STG Expiration Date 07/03/20   Short Term Goal #1 In 7-10 days: Increase bilateral LE strength 1/2 grade to facilitate independent mobility, Perform all bed mobility tasks modified independent to decrease caregiver burden, Perform all transfers with distant S to improve independence, Ambulate > 150 ft  with least restrictive assistive device with distant S w/o LOB and w/ normalized gait pattern 100% of the time, Navigate 3 stairs x 3 trials with distant S with unilateral handrail to facilitate return to previous living environment and Increase all balance 1 grade to decrease risk for falls   PT Treatment Day 1   Plan   Treatment/Interventions Functional transfer training;LE strengthening/ROM; Elevations; Therapeutic exercise; Endurance training;Patient/family training;Equipment eval/education;Gait training;Bed mobility;Spoke to nursing;OT   PT Frequency   (3-5x/wk)   Recommendation   PT Discharge Recommendation Post-Acute Rehabilitation Services   Equipment Recommended Walker  (RW)   PT - OK to Discharge Yes  (when medically cleared if to STR)   Modified Madison Scale   Modified Madison Scale 4   Barthel Index   Feeding 10   Bathing 0   Grooming Score 5 Dressing Score 5   Bladder Score 0   Bowels Score 5   Toilet Use Score 5   Transfers (Bed/Chair) Score 5   Mobility (Level Surface) Score 0   Stairs Score 0   Barthel Index Score 35           Myra Orellana PT, DPT    Physical Therapy Treatment Note  Time In: 840  Time Out: 850  Total Time: 10 min     S:  Pt agreeable to PT treatment session s/p PT eval, in no apparent distress and responsive      O:  Pt seen for PT treatment session this date with interventions consisting of therapeutic activity consisting of training: static standing tolerance for 30 sec w/ B UE support of RW, vc and tactile cues for static standing posture faciliation  VC required for technique      A:  Education on improving lateral weight shifting as pre gait activity  Pt requiring bed<>chair swap for enhanced pt safety for OOB transfer d/t pt's poor ability to advance B LEs  Pt able to tolerate static standing for 30 sec duration to perform bed chair swap, tactile cues provided for upright posture symmetrical position  Use of RW, B UE support  Post session: pt returned back to recliner, chair alarm engaged, all needs in reach and RN notified of session findings/recommendations      P:  Continue to recommend STR at time of d/c in order to maximize pt's functional independence and safety w/ mobility  Pt continues to be functioning below baseline level, and remains limited 2* factors listed above  PT will continue to see pt while here in order to address the deficits listed above and provide interventions consistent w/ POC in effort to achieve STGs      Myra Orellana PT, DPT

## 2020-06-23 NOTE — ASSESSMENT & PLAN NOTE
Patient has a history of a neurogenic bladder with an indwelling Smith catheter, was scheduled for urology follow-up outpatient 7/24  She has a few day history of cloudy urine, generalized weakness, and some episodes a hematuria last week  - UA showed innumerable bacteria  - creatinine 1 17, history of CKD 3  - CT of the abdomen showed hydroureternephrosis, with some bladder wall thickening and need for follow-up cystoscopy to rule out bladder cancer  Urology will evaluate the patient this afternoon  They asked to keep the patient off the Coumadin for now just in case anything as planned later this week  -will continue Rocephin 1 g Q 24 in follow-up urine culture  - in May patient had UTI with E coli and Proteus, most recent urine culture showed mixed contaminants

## 2020-06-23 NOTE — PLAN OF CARE
Problem: OCCUPATIONAL THERAPY ADULT  Goal: Performs self-care activities at highest level of function for planned discharge setting  See evaluation for individualized goals  Description  Treatment Interventions: ADL retraining, Functional transfer training, UE strengthening/ROM, Endurance training, Cognitive reorientation, Patient/family training, Equipment evaluation/education, Compensatory technique education, Energy conservation, Activityengagement, Continued evaluation          See flowsheet documentation for full assessment, interventions and recommendations  Note:   Limitation: Decreased ADL status, Decreased UE strength, Decreased Safe judgement during ADL, Decreased cognition, Decreased endurance, Decreased self-care trans, Decreased high-level ADLs  Prognosis: Good  Assessment: Patient is a 80 y o  female seen for OT evaluation s/p admit to [id number] [place] [address] on 6/22/2020 w/Urinary tract infection associated with indwelling urethral catheter ([address] )  Commorbidities affecting patient's functional performance at time of assessment include: hydroureteronephrosis, anemia, history of DVT, generalized weakness, and benign HTN with CKD stage III  Orders placed for OT evaluation and treatment  Performed at least two patient identifiers during session including name and wristband  Prior to admission, patient was living with her spouse in a two-story home with 3 MIKAELA and a first-floor setup available if needed per patient report  Patient reports that at baseline, she is independent in ADLs and requires assistance for IADLs from family, including her spouse and son  Personal factors affecting patient at time of initial evaluation include: steps to enter, limited insight into deficits, decreased initiation and engagement, difficulty performing ADLs and difficulty performing IADLs   Upon evaluation, patient requires minimal  assist for UB ADLs, maximal assist for LB ADLs, transfers with moderate assist, with the use of Rolling Walker  Ambulation deferred at this time due to patient's difficulty weight shifting on LLE Occupational performance is affected by the following deficits: orientation, decreased muscle strength, degenerative arthritic joint changes, impaired gross motor coordination, dynamic sit/ stand balance deficit with poor standing tolerance time for self care and functional mobility, decreased activity tolerance, impaired memory, impaired problem solving, decreased safety awareness and postural control and postural alignment deficit, requiring external assistance to complete transitional movements  Therapist completed  extensive additional review of medical records and additional review of physical, cognitive or psychosocial history, clinical examination identifying 5 or more performance deficits, clinical decision making of a high complexity , consistent with a high complexity level evaluation  Patient to benefit from continued Occupational Therapy treatment while in the hospital to address deficits as defined above and maximize level of functional independence with ADLs and functional mobility   Occupational Performance areas to address include: grooming , bathing/ shower, dressing, toilet hygiene, transfer to all surfaces, functional mobility, community mobility, IADLs: safety procedures, Leisure Participation and Social part     OT Discharge Recommendation: Post-Acute Rehabilitation Services

## 2020-06-23 NOTE — ASSESSMENT & PLAN NOTE
CT abdomen showed: Moderate to severe right hydroureteronephrosis with  high attenuation seen within the collecting system, possibly related to hemorrhage or abnormal excretion of contrast from the right kidney related to previous CT study about 1 week ago  There is abrupt nonvisualization of the right ureter at the UVJ with associated soft tissue fullness and bladder wall thickening  Correlate with cystoscopy regarding possible bladder neoplasm  Additionally, there is obscuration of the soft tissue planes between   the vaginal cuff and posterior right bladder wall (best seen on series 2 image 102)  The possibility of potential neoplastic involvement of this region cannot be excluded    Follow with urology recommendations later

## 2020-06-24 ENCOUNTER — APPOINTMENT (INPATIENT)
Dept: RADIOLOGY | Facility: HOSPITAL | Age: 85
DRG: 698 | End: 2020-06-24
Payer: MEDICARE

## 2020-06-24 LAB
ANION GAP SERPL CALCULATED.3IONS-SCNC: 8 MMOL/L (ref 4–13)
BUN SERPL-MCNC: 18 MG/DL (ref 5–25)
CALCIUM SERPL-MCNC: 7.5 MG/DL (ref 8.3–10.1)
CHLORIDE SERPL-SCNC: 101 MMOL/L (ref 100–108)
CO2 SERPL-SCNC: 24 MMOL/L (ref 21–32)
CREAT SERPL-MCNC: 1.16 MG/DL (ref 0.6–1.3)
ERYTHROCYTE [DISTWIDTH] IN BLOOD BY AUTOMATED COUNT: 15.4 % (ref 11.6–15.1)
GFR SERPL CREATININE-BSD FRML MDRD: 42 ML/MIN/1.73SQ M
GLUCOSE SERPL-MCNC: 102 MG/DL (ref 65–140)
HCT VFR BLD AUTO: 24.2 % (ref 34.8–46.1)
HGB BLD-MCNC: 7.9 G/DL (ref 11.5–15.4)
INR PPP: 2.51 (ref 0.84–1.19)
MCH RBC QN AUTO: 30.5 PG (ref 26.8–34.3)
MCHC RBC AUTO-ENTMCNC: 32.6 G/DL (ref 31.4–37.4)
MCV RBC AUTO: 93 FL (ref 82–98)
PLATELET # BLD AUTO: 102 THOUSANDS/UL (ref 149–390)
PMV BLD AUTO: 9.7 FL (ref 8.9–12.7)
POTASSIUM SERPL-SCNC: 3.7 MMOL/L (ref 3.5–5.3)
PROTHROMBIN TIME: 27.4 SECONDS (ref 11.6–14.5)
RBC # BLD AUTO: 2.59 MILLION/UL (ref 3.81–5.12)
SODIUM SERPL-SCNC: 133 MMOL/L (ref 136–145)
WBC # BLD AUTO: 8.93 THOUSAND/UL (ref 4.31–10.16)

## 2020-06-24 PROCEDURE — 99232 SBSQ HOSP IP/OBS MODERATE 35: CPT | Performed by: FAMILY MEDICINE

## 2020-06-24 PROCEDURE — 80048 BASIC METABOLIC PNL TOTAL CA: CPT | Performed by: FAMILY MEDICINE

## 2020-06-24 PROCEDURE — 85610 PROTHROMBIN TIME: CPT | Performed by: FAMILY MEDICINE

## 2020-06-24 PROCEDURE — NC001 PR NO CHARGE: Performed by: UROLOGY

## 2020-06-24 PROCEDURE — 71045 X-RAY EXAM CHEST 1 VIEW: CPT

## 2020-06-24 PROCEDURE — 85027 COMPLETE CBC AUTOMATED: CPT | Performed by: FAMILY MEDICINE

## 2020-06-24 RX ORDER — FUROSEMIDE 10 MG/ML
20 INJECTION INTRAMUSCULAR; INTRAVENOUS ONCE
Status: COMPLETED | OUTPATIENT
Start: 2020-06-24 | End: 2020-06-24

## 2020-06-24 RX ADMIN — CEFTRIAXONE SODIUM 1000 MG: 10 INJECTION, POWDER, FOR SOLUTION INTRAVENOUS at 14:47

## 2020-06-24 RX ADMIN — Medication 2 G: at 17:46

## 2020-06-24 RX ADMIN — FERROUS SULFATE TAB 325 MG (65 MG ELEMENTAL FE) 325 MG: 325 (65 FE) TAB at 08:48

## 2020-06-24 RX ADMIN — Medication 2 G: at 08:48

## 2020-06-24 RX ADMIN — Medication 2 G: at 14:30

## 2020-06-24 RX ADMIN — PANTOPRAZOLE SODIUM 20 MG: 20 TABLET, DELAYED RELEASE ORAL at 05:11

## 2020-06-24 RX ADMIN — FUROSEMIDE 20 MG: 10 INJECTION, SOLUTION INTRAMUSCULAR; INTRAVENOUS at 14:40

## 2020-06-24 NOTE — PROGRESS NOTES
Progress Note - Mendez Francois 9/20/1930, 80 y o  female MRN: [id number]    Unit/Bed#: -01 Encounter: [id number]    Primary Care Provider: Leopoldo Corrigan, DO   Date and time admitted to hospital: 6/22/2020 10:22 AM        Hydroureteronephrosis  Assessment & Plan  CT abdomen showed: Moderate to severe right hydroureteronephrosis with  high attenuation seen within the collecting system, possibly related to hemorrhage or abnormal excretion of contrast from the right kidney related to previous CT study about 1 week ago  There is abrupt nonvisualization of the right ureter at the UVJ with associated soft tissue fullness and bladder wall thickening  Correlate with cystoscopy regarding possible bladder neoplasm  Additionally, there is obscuration of the soft tissue planes between   the vaginal cuff and posterior right bladder wall (best seen on series 2 image 102)  The possibility of potential neoplastic involvement of this region cannot be excluded  Follow up with with urology conversation later today  NPO after midnight just in case they proceed with surgery    Anemia  Assessment & Plan  - history of anemia, takes oral iron daily, hemoglobin 8 2  Hemoglobin is stable     History of DVT (deep vein thrombosis)  Assessment & Plan  Hold Coumadin today for potential cystoscopy  Was held yesterday as well as per recommendations with the Urology  Patient does have a history of factor 5 Leiden so within 1 hold it for too long and she drops below 3 I will put the patient on IV heparin      Generalized weakness  Assessment & Plan  - patient has had a few days of generalized weakness, no her falls, will have PT OT evaluate, patient lives with son and is having trouble getting around the house and oldest son who was at bedside would like patient to be considered for short-term rehab    Benign hypertension with chronic kidney disease, stage III ([address] )  Assessment & Plan  Creatinine 1 17 which appears at patient's baseline, blood pressure elevated on arrival will continue to monitor, does not appear like patient is on medication for hypertension, son does not have medication list with him, will continue to monitor blood pressure  Creatinine has improved this morning to 0 96    * Urinary tract infection associated with indwelling urethral catheter [facility])  Assessment & Plan  Patient has a history of a neurogenic bladder with an indwelling Smith catheter, was scheduled for urology follow-up outpatient 7/24  She has a few day history of cloudy urine, generalized weakness, and some episodes a hematuria last week  - UA showed innumerable bacteria  - creatinine 1 17, history of CKD 3  - CT of the abdomen showed hydroureternephrosis, with some bladder wall thickening and need for follow-up cystoscopy to rule out bladder cancer  Urology will evaluate the patient this afternoon  They asked to keep the patient off the Coumadin for now just in case anything as planned later this week  -will continue Rocephin 1 g Q 24 in follow-up urine culture  - in May patient had UTI with E coli and Proteus, most recent urine culture showed mixed contaminants  Acute hypoxemic respiratory failure  This could be secondary to little extra volume  Patient does have some pleural effusions she also has some atelectasis  Will give us small dose of Lasix and also recommend incentive spirometry  VTE Pharmacologic Prophylaxis:   Pharmacologic: Warfarin (Coumadin)  Mechanical VTE Prophylaxis in Place: Yes    Patient Centered Rounds: I have performed bedside rounds with nursing staff today  Discussions with Specialists or Other Care Team Provider:  Did Reach out to Urology    Education and Discussions with Family / Patient:  Son at the bedside  Will give the daughter-in-law call  Time Spent for Care: 20 minutes  More than 50% of total time spent on counseling and coordination of care as described above      Current Length of Stay: 2 day(s)    Current Patient Status: Inpatient   Certification Statement: The patient will continue to require additional inpatient hospital stay due to Needing IV antibiotics and a urology plan and also currently requiring oxygen at 4 L by nasal cannula    Discharge Plan:  Maybe in the next 48 to 72 hours    Code Status: Level 1 - Full Code      Subjective:   Patient seen examined  Had increased oxygen requirements last night  Otherwise she just feels tired  Objective:     Vitals:   Temp (24hrs), Av 3 °F (36 8 °C), Min:97 8 °F (36 6 °C), Max:98 9 °F (37 2 °C)    Temp:  [97 8 °F (36 6 °C)-98 9 °F (37 2 °C)] 98 3 °F (36 8 °C)  HR:  [77-85] 85  Resp:  [17-18] 18  BP: (152-166)/(66-84) 152/66  SpO2:  [90 %-95 %] 90 %  Body mass index is 26 26 kg/m²  Input and Output Summary (last 24 hours): Intake/Output Summary (Last 24 hours) at 2020 1443  Last data filed at 2020 0851  Gross per 24 hour   Intake 340 ml   Output 1100 ml   Net -760 ml       Physical Exam:     Physical Exam  (   General Appearance:    Alert, cooperative, no distress, appears stated age                               Lungs:     Clear to auscultation bilaterally, respirations unlabored       Heart:    Regular rate and rhythm, S1 and S2 normal, no murmur, rub    or gallop   Abdomen:     Soft, non-tender, bowel sounds active all four quadrants,     no masses, no organomegaly           Extremities:   Extremities normal, atraumatic, no cyanosis or edema       Additional Data:     Labs:    Results from last 7 days   Lab Units 20  0435  20  1053   WBC Thousand/uL 8 93   < > 8 28   HEMOGLOBIN g/dL 7 9*   < > 8 2*   HEMATOCRIT % 24 2*   < > 25 1*   PLATELETS Thousands/uL 102*   < > 123*   NEUTROS PCT %  --   --  75   LYMPHS PCT %  --   --  12*   MONOS PCT %  --   --  8   EOS PCT %  --   --  3    < > = values in this interval not displayed       Results from last 7 days   Lab Units 20  0435 20  0637   SODIUM mmol/L 133* 132*   POTASSIUM mmol/L 3 7 3 9   CHLORIDE mmol/L 101 102   CO2 mmol/L 24 21   BUN mg/dL 18 18   CREATININE mg/dL 1 16 0 96   ANION GAP mmol/L 8 9   CALCIUM mg/dL 7 5* 7 5*   ALBUMIN g/dL  --  2 0*   TOTAL BILIRUBIN mg/dL  --  0 50   ALK PHOS U/L  --  147*   ALT U/L  --  20   AST U/L  --  25   GLUCOSE RANDOM mg/dL 102 85     Results from last 7 days   Lab Units 06/24/20  0435   INR  2 51*             Results from last 7 days   Lab Units 06/22/20  1054 06/22/20  1053   LACTIC ACID mmol/L  --  0 8   PROCALCITONIN ng/ml 0 14  --            * I Have Reviewed All Lab Data Listed Above  * Additional Pertinent Lab Tests Reviewed: [address] Admission Reviewed    Imaging:        Recent Cultures (last 7 days):     Results from last 7 days   Lab Units 06/22/20  1741 06/22/20  1054   BLOOD CULTURE   --  No Growth at 24 hrs  No Growth at 24 hrs  URINE CULTURE  >100,000 cfu/ml Gram Negative Darryl*  --        Last 24 Hours Medication List:     Current Facility-Administered Medications:  acetaminophen 650 mg Oral Q6H PRN Gwendalyn Labor, DO    albuterol 2 puff Inhalation Q4H PRN Gwendalyn Labor, DO    cefTRIAXone 1,000 mg Intravenous Q24H Gwendalyn Labor, DO Last Rate: Stopped (06/23/20 1337)   ferrous sulfate 325 mg Oral Daily With Breakfast Celia Ayana, DO    fluticasone 1 spray Nasal Daily PRN Gwendalyn Labor, DO    pantoprazole 20 mg Oral Early Morning Celia Ayana, DO    polyethylene glycol 17 g Oral Daily PRN Gwendalyn Labor, DO    sodium chloride 1 spray Each Nare Q1H PRN Allan Jaimes MD    sodium chloride 2 g Oral TID With Meals Gwendalyn Labor, DO         Today, Patient Was Seen By: Allan Jaimes MD    ** Please Note: Dictation voice to text software may have been used in the creation of this document   **

## 2020-06-24 NOTE — SOCIAL WORK
Abdon spoke with the pt dtr-n-law and she states that she spoke with a surgeon today and was informed about the pt surgery tomorrow  She has requested that the CM reach put to the surgeon and speak with him about talking with the pt sons about the procedure  She says that she would feel more comfortable with the pt sons receiving the information rather than her being the point of contact, since this is a very serious matter, given the pt age  CM reached out to the urologist to provide him with an update regarding her concerns       Pt dtr-n-law Catalina Baum [phone]  Urologist Jose Lee

## 2020-06-24 NOTE — ASSESSMENT & PLAN NOTE
Hold Coumadin today for potential cystoscopy  Was held yesterday as well as per recommendations with the Urology  Patient does have a history of factor 5 Leiden so within 1 hold it for too long and she drops below 3 I will put the patient on IV heparin

## 2020-06-24 NOTE — SOCIAL WORK
Cm was informed by nursing that there was a call from someone named Kristine Solomon inquiring about the pt and requesting an update    Kristine Solomon [phone]    Cm called the number and it was a [facility] CM, Kristine Solomon    The CM provided her with an update on the pt

## 2020-06-24 NOTE — ASSESSMENT & PLAN NOTE
CT abdomen showed: Moderate to severe right hydroureteronephrosis with  high attenuation seen within the collecting system, possibly related to hemorrhage or abnormal excretion of contrast from the right kidney related to previous CT study about 1 week ago  There is abrupt nonvisualization of the right ureter at the UVJ with associated soft tissue fullness and bladder wall thickening  Correlate with cystoscopy regarding possible bladder neoplasm  Additionally, there is obscuration of the soft tissue planes between   the vaginal cuff and posterior right bladder wall (best seen on series 2 image 102)  The possibility of potential neoplastic involvement of this region cannot be excluded  Follow up with with urology conversation later today    NPO after midnight just in case they proceed with surgery

## 2020-06-24 NOTE — PLAN OF CARE
Problem: Prexisting or High Potential for Compromised Skin Integrity  Goal: Skin integrity is maintained or improved  Description  INTERVENTIONS:  - Identify patients at risk for skin breakdown  - Assess and monitor skin integrity  - Assess and monitor nutrition and hydration status  - Monitor labs   - Assess for incontinence   - Turn and reposition patient  - Assist with mobility/ambulation  - Relieve pressure over bony prominences  - Avoid friction and shearing  - Provide appropriate hygiene as needed including keeping skin clean and dry  - Evaluate need for skin moisturizer/barrier cream  - Collaborate with interdisciplinary team   - Patient/family teaching  - Consider wound care consult   Outcome: Progressing     Problem: Potential for Falls  Goal: Patient will remain free of falls  Description  INTERVENTIONS:  - Assess patient frequently for physical needs  -  Identify cognitive and physical deficits and behaviors that affect risk of falls    -  [place] fall precautions as indicated by assessment   - Educate patient/family on patient safety including physical limitations  - Instruct patient to call for assistance with activity based on assessment  - Modify environment to reduce risk of injury  - Consider OT/PT consult to assist with strengthening/mobility  Outcome: Progressing     Problem: PAIN - ADULT  Goal: Verbalizes/displays adequate comfort level or baseline comfort level  Description  Interventions:  - Encourage patient to monitor pain and request assistance  - Assess pain using appropriate pain scale  - Administer analgesics based on type and severity of pain and evaluate response  - Implement non-pharmacological measures as appropriate and evaluate response  - Consider cultural and social influences on pain and pain management  - Notify physician/advanced practitioner if interventions unsuccessful or patient reports new pain  Outcome: Progressing     Problem: SAFETY ADULT  Goal: Maintain or return to baseline ADL function  Description  INTERVENTIONS:  -  Assess patient's ability to carry out ADLs; assess patient's baseline for ADL function and identify physical deficits which impact ability to perform ADLs (bathing, care of mouth/teeth, toileting, grooming, dressing, etc )  - Assess/evaluate cause of self-care deficits   - Assess range of motion  - Assess patient's mobility; develop plan if impaired  - Assess patient's need for assistive devices and provide as appropriate  - Encourage maximum independence but intervene and supervise when necessary  - Involve family in performance of ADLs  - Assess for home care needs following discharge   - Consider OT consult to assist with ADL evaluation and planning for discharge  - Provide patient education as appropriate  Outcome: Progressing  Goal: Maintain or return mobility status to optimal level  Description  INTERVENTIONS:  - Assess patient's baseline mobility status (ambulation, transfers, stairs, etc )    - Identify cognitive and physical deficits and behaviors that affect mobility  - Identify mobility aids required to assist with transfers and/or ambulation (gait belt, sit-to-stand, lift, walker, cane, etc )  - [place] fall precautions as indicated by assessment  - Record patient progress and toleration of activity level on Mobility SBAR; progress patient to next Phase/Stage  - Instruct patient to call for assistance with activity based on assessment  - Consider rehabilitation consult to assist with strengthening/weightbearing, etc   Outcome: Progressing     Problem: DISCHARGE PLANNING  Goal: Discharge to home or other facility with appropriate resources  Description  INTERVENTIONS:  - Identify barriers to discharge w/patient and caregiver  - Arrange for needed discharge resources and transportation as appropriate  - Identify discharge learning needs (meds, wound care, etc )  - Arrange for interpretive services to assist at discharge as needed  - Refer to Case Management Department for coordinating discharge planning if the patient needs post-hospital services based on physician/advanced practitioner order or complex needs related to functional status, cognitive ability, or social support system  Outcome: Progressing     Problem: Knowledge Deficit  Goal: Patient/family/caregiver demonstrates understanding of disease process, treatment plan, medications, and discharge instructions  Description  Complete learning assessment and assess knowledge base  Interventions:  - Provide teaching at level of understanding  - Provide teaching via preferred learning methods  Outcome: Progressing     Problem: Nutrition/Hydration-ADULT  Goal: Nutrient/Hydration intake appropriate for improving, restoring or maintaining nutritional needs  Description  Monitor and assess patient's nutrition/hydration status for malnutrition  Collaborate with interdisciplinary team and initiate plan and interventions as ordered  Monitor patient's weight and dietary intake as ordered or per policy  Utilize nutrition screening tool and intervene as necessary  Determine patient's food preferences and provide high-protein, high-caloric foods as appropriate       INTERVENTIONS:  - Monitor oral intake, urinary output, labs, and treatment plans  - Assess nutrition and hydration status and recommend course of action  - Evaluate amount of meals eaten  - Assist patient with eating if necessary   - Allow adequate time for meals  - Recommend/ encourage appropriate diets, oral nutritional supplements, and vitamin/mineral supplements  - Order, calculate, and assess calorie counts as needed  - Recommend, monitor, and adjust tube feedings based on assessed needs  - Assess need for intravenous fluids  - Provide  nutrition/hydration education as appropriate  - Include patient/family/caregiver in decisions related to nutrition   Outcome: Progressing

## 2020-06-24 NOTE — PROGRESS NOTES
I had a conference call just now with the daughter-in-law and the patient's sons  I again discussed with him the risks and benefits of surgery, and the fact that are surgery would be for diagnostic purposes to obtain tissue  The patient's tumor markers have been increasing, her functional status has been decreasing  They will think on the options in terms of what they would like done  They will let me know tomorrow whether not they wish to undergo transurethral resection of bladder tumor for tissue sampling, and ureteral stent placement  We spoke on the telephone just now for 30 minutes, they are understandably upset about having to make this decision, but they did appreciate our candid conversation regarding risks and benefits of any surgical intervention in this patient  Tonie Colon

## 2020-06-24 NOTE — CONSULTS
Progress Note - Wound   Lauryn Pak 80 y o  female MRN: [id number]  Unit/Bed#: -01 Encounter: [id number]      Assessment:  Wound care consulted for assessment of 2 open areas noted to the sacrum that were noted on admission by nursing  Patient seen in bed, family (son) at bedside, both are agreeable for the assessment  Patient is oriented x 2  Dependent for care  Smith cath in place  Assist of one with turning for the assessment  Wedges in use for repositioning  Nutrition is following  Will recommend accu-max to the mattress  B/l heels and buttocks are intact with no redness or wounds noted  Sacrum with 2 pressure injuries - POA  Wounds photographed together  There is blanchable hyperpigmented skin in between the open areas  1  Proximal sacrum with stage 2 pressure injury POA - wound bed is round shaped, 100% pink/red tissue  Partial thickness  Edges fragile and attached, no maceration  Jessi-wound is intact with blanchable hyperpigmented skin  2  Distal sacrum with unstageable pressure injury POA  True depth of wound is unknown due to devitalized tissue covering the wound bed  Wound bed is approx: 80% yellow adhered tissue, 20% pink/red tissue surrounding the slough  Edges fragile and attached, no maceration  Jessi-wound is intact with blanchable hyperpigmented skin and pink intact blanchable scarring    - due to location of wounds - will recommend calazime cream  Son reports patient has incontinence of bowel at times, and confirms that patient sits OOB a lot - discussed for them to take the [facility] waffle cushion that is at bedside already on the recliner chair home with them to help with offloading  Discussed appropriate use of calazime cream - verbalized understanding of the plan in place  Educated patient and son on the importance of frequent offloading of pressure via turning, repositioning and weight-shifting in the bed and in the chair  Verbalized understanding of plan of care      No induration, fluctuance, odor, warmth, redness, or purulence noted to the above noted wounds  Patient tolerated well- no s/s of non-verbal pain noted  Primary nurse aware of plan of care  See flow sheets for more detailed assessment findings  Will follow along  Plan: 1  Cleanse b/l buttocks and sacrum with soap and water, pat dry, apply calazime cream to the sacrum and apply hydraguard to the b/l buttocks TID and PRN  2  Apply skin nourishing cream the entire skin daily for moisture  3  Turn and reposition patient every  2 hours   4  Elevate heels off of bed with pillows to offload pressure   5  Apply EHOB waffle cushion to chair when OOB, if able  6  Allevyn foam to heels, flavia w/P, peel foam check skin integrity q-shift  Change q5d   7  Please apply accu-max pump to mattress  8  Wound care will follow along with patient weekly, please call with questions and concerns    Objective:    Vitals: Blood pressure 152/66, pulse 85, temperature 98 3 °F (36 8 °C), temperature source Oral, resp  rate 18, height 5' (1 524 m), weight 61 kg (134 lb 7 7 oz), SpO2 90 %  ,Body mass index is 26 26 kg/m²  Wound 06/22/20 Pressure Injury Sacrum Upper;Distal (Active)   Wound Image   6/24/2020 10:30 AM   Wound Description Beefy red;Yellow;Slough;Pink 6/24/2020 10:30 AM   Staging Unstagable 6/24/2020 10:30 AM   Jessi-wound Assessment Dry; Intact;Fragile; Hyperpigmented 6/24/2020 10:30 AM   Wound Length (cm) 0 5 cm 6/24/2020 10:30 AM   Wound Width (cm) 0 5 cm 6/24/2020 10:30 AM   Wound Depth (cm) 0 1 6/24/2020 10:30 AM   Calculated Wound Area (cm^2) 0 25 cm^2 6/24/2020 10:30 AM   Calculated Wound Volume (cm^3) 0 02 cm^3 6/24/2020 10:30 AM   Tunneling 0 cm 6/24/2020 10:30 AM   Tunneling in depth located at 0 6/24/2020 10:30 AM   Undermining 0 6/24/2020 10:30 AM   Undermining is depth extending from 0 6/24/2020 10:30 AM   Closure None 6/24/2020 10:30 AM   Drainage Amount Scant 6/24/2020 10:30 AM   Drainage Description Serosanguineous 6/24/2020 10:30 AM   Non-staged Wound Description Partial thickness 6/24/2020 10:30 AM   Treatments Cleansed;Site care;Irrigation with NSS;Elevated 6/24/2020 10:30 AM   Dressing Protective barrier 6/24/2020 10:30 AM   Wound packed? No 6/24/2020 10:30 AM   Packing- # removed 0 6/24/2020 10:30 AM   Packing- # inserted 0 6/24/2020 10:30 AM   Patient Tolerance Tolerated well 6/24/2020 10:30 AM       Wound 06/24/20 Pressure Injury Sacrum Proximal (Active)   Wound Description Beefy red;Pink 6/24/2020 10:30 AM   Staging Stage II 6/24/2020 10:30 AM   Jessi-wound Assessment Dry; Intact;Fragile; Hyperpigmented 6/24/2020 10:30 AM   Wound Length (cm) 0 2 cm 6/24/2020 10:30 AM   Wound Width (cm) 0 2 cm 6/24/2020 10:30 AM   Wound Depth (cm) 0 1 6/24/2020 10:30 AM   Calculated Wound Area (cm^2) 0 04 cm^2 6/24/2020 10:30 AM   Calculated Wound Volume (cm^3) 0 cm^3 6/24/2020 10:30 AM   Tunneling 0 cm 6/24/2020 10:30 AM   Tunneling in depth located at 0 6/24/2020 10:30 AM   Undermining 0 6/24/2020 10:30 AM   Undermining is depth extending from 0 6/24/2020 10:30 AM   Closure None 6/24/2020 10:30 AM   Drainage Amount Scant 6/24/2020 10:30 AM   Drainage Description Serosanguineous 6/24/2020 10:30 AM   Non-staged Wound Description Partial thickness 6/24/2020 10:30 AM   Treatments Cleansed;Irrigation with NSS;Site care;Elevated 6/24/2020 10:30 AM   Dressing Protective barrier 6/24/2020 10:30 AM   Wound packed? No 6/24/2020 10:30 AM   Packing- # removed 0 6/24/2020 10:30 AM   Packing- # inserted 0 6/24/2020 10:30 AM   Patient Tolerance Tolerated well 6/24/2020 10:30 AM       Recommendations written as orders  AVS updated    Yulisa Gordon RN BSN [facility]

## 2020-06-24 NOTE — PROGRESS NOTES
I called and spoke to the patient's daughter-in-law, as well as to her son regarding the imaging finding showing high density material throughout the right collecting system with tortuosity of the ureter, and thickened bladder wall posteriorly  In reviewing the patient's history with them it sounds like she has had ovarian cancer previously, and previously was also a smoker  As such, her bladder wall thickening could represent cystitis (she does have a history of recurrent infections, as is common in women in their late [de-identified]), a urothelial carcinoma, or recurrent ovarian or other GYN carcinoma in this area  I did tell them that this would be in a retrograde approach to the urethra, under general anesthesia, and would involve a potential transurethral section of bladder tumor and ureteral stent placement  If a ureteral stent is placed it would require changing every so often, typically every 3-4 months, which would involve further exposure to general anesthesia  It sounds like over the last few months the patient has been losing weight, she can no longer walk on her own, and her cognitive status has been worsening  Given this information I did tell the daughter-in-law and her son that she will be at an increased risk for any surgical intervention to include the risk of death or harm from undergoing this procedure  They are thinking about their options to see what they would like in terms of goals of care    I did tell them that surgery is not necessarily contraindicated given the imaging and laboratory findings, however I did urge them to fully consider risks and benefits in this particular case prior to having as proceed to cystoscopy with bladder biopsy/TURBT and ureteral stent placement

## 2020-06-25 LAB
ANION GAP SERPL CALCULATED.3IONS-SCNC: 6 MMOL/L (ref 4–13)
BUN SERPL-MCNC: 20 MG/DL (ref 5–25)
CALCIUM SERPL-MCNC: 7.6 MG/DL (ref 8.3–10.1)
CHLORIDE SERPL-SCNC: 100 MMOL/L (ref 100–108)
CO2 SERPL-SCNC: 27 MMOL/L (ref 21–32)
CREAT SERPL-MCNC: 1.16 MG/DL (ref 0.6–1.3)
ERYTHROCYTE [DISTWIDTH] IN BLOOD BY AUTOMATED COUNT: 15.4 % (ref 11.6–15.1)
GFR SERPL CREATININE-BSD FRML MDRD: 42 ML/MIN/1.73SQ M
GLUCOSE SERPL-MCNC: 104 MG/DL (ref 65–140)
HCT VFR BLD AUTO: 24.4 % (ref 34.8–46.1)
HGB BLD-MCNC: 8 G/DL (ref 11.5–15.4)
INR PPP: 2.19 (ref 0.84–1.19)
MCH RBC QN AUTO: 30.7 PG (ref 26.8–34.3)
MCHC RBC AUTO-ENTMCNC: 32.8 G/DL (ref 31.4–37.4)
MCV RBC AUTO: 94 FL (ref 82–98)
PLATELET # BLD AUTO: 107 THOUSANDS/UL (ref 149–390)
PMV BLD AUTO: 10.2 FL (ref 8.9–12.7)
POTASSIUM SERPL-SCNC: 3.5 MMOL/L (ref 3.5–5.3)
PROTHROMBIN TIME: 24.6 SECONDS (ref 11.6–14.5)
RBC # BLD AUTO: 2.61 MILLION/UL (ref 3.81–5.12)
SODIUM SERPL-SCNC: 133 MMOL/L (ref 136–145)
WBC # BLD AUTO: 8.63 THOUSAND/UL (ref 4.31–10.16)

## 2020-06-25 PROCEDURE — 85610 PROTHROMBIN TIME: CPT | Performed by: FAMILY MEDICINE

## 2020-06-25 PROCEDURE — 80048 BASIC METABOLIC PNL TOTAL CA: CPT | Performed by: FAMILY MEDICINE

## 2020-06-25 PROCEDURE — 99232 SBSQ HOSP IP/OBS MODERATE 35: CPT | Performed by: NURSE PRACTITIONER

## 2020-06-25 PROCEDURE — 99232 SBSQ HOSP IP/OBS MODERATE 35: CPT | Performed by: FAMILY MEDICINE

## 2020-06-25 PROCEDURE — 85027 COMPLETE CBC AUTOMATED: CPT | Performed by: FAMILY MEDICINE

## 2020-06-25 RX ORDER — WARFARIN SODIUM 4 MG/1
4 TABLET ORAL
Status: DISCONTINUED | OUTPATIENT
Start: 2020-06-25 | End: 2020-06-26

## 2020-06-25 RX ADMIN — Medication 2 G: at 18:15

## 2020-06-25 RX ADMIN — PANTOPRAZOLE SODIUM 20 MG: 20 TABLET, DELAYED RELEASE ORAL at 05:07

## 2020-06-25 RX ADMIN — WARFARIN SODIUM 4 MG: 4 TABLET ORAL at 18:15

## 2020-06-25 RX ADMIN — CEFTRIAXONE SODIUM 1000 MG: 10 INJECTION, POWDER, FOR SOLUTION INTRAVENOUS at 11:50

## 2020-06-25 NOTE — ASSESSMENT & PLAN NOTE
- patient has had a few days of generalized weakness, no her falls, will have PT OT evaluate, patient lives with son and is having trouble getting around the house and oldest son who was at bedside would like patient to be considered for short-term rehab  Of the plan is to go home or to rehab    Will discuss with case management

## 2020-06-25 NOTE — SOCIAL WORK
Cm met with pt and family at the bedside to discuss dcp  The pt and family reside in a multi story home with 1 MIKA[address]A and 5 steps to her first floor room  The pt uses a wheelchair at all times and requires assistance with all ADL's  The pt is currently on O2 and this is new  The pt has had VNA in the past but does not recall the name  The pt has no Hx of STR  MH or SA  The pt son, Soto Tristan is her POA  The pt will Rx at [facility] in [facility] and she is able to afford her meds  The pt is retired and her family transport her to and from [employer]  The pt family resides in very close proximity to the pt  CM reviewed discharge planning process including the following: identifying caregivers at home, preference for d/c planning needs, availability of treatment team to discuss questions or concerns patient and/or family may have regarding diagnosis, plan of care, old or new medications and discharge planning  Discharge Checklist reviewed and CM will continue to monitor for progress toward discharge goals in nursing and provider rounds  The pt jossr Hudson Gann [phone] was contacted about STR and she ahs requested that there be a list provided that included the [place] area  CM emailed the list of STR to [email or web address]  She will either call or email her choices to the CM

## 2020-06-25 NOTE — ASSESSMENT & PLAN NOTE
I have restarted the patient's Coumadin today  She had offer 2 days for possible OR but they are not going to pursue with the OR at this time  This could be revaluated as an outpatient    INR has been therapeutic

## 2020-06-25 NOTE — PROGRESS NOTES
Progress Note - Jessica Acosta 9/20/1930, 80 y o  female MRN: [id number]    Unit/Bed#: -01 Encounter: [id number]    Primary Care Provider: Charise Nissen, DO   Date and time admitted to hospital: 6/22/2020 10:22 AM        Hydroureteronephrosis  Assessment & Plan  CT abdomen showed: Moderate to severe right hydroureteronephrosis with  high attenuation seen within the collecting system, possibly related to hemorrhage or abnormal excretion of contrast from the right kidney related to previous CT study about 1 week ago  There is abrupt nonvisualization of the right ureter at the UVJ with associated soft tissue fullness and bladder wall thickening  Correlate with cystoscopy regarding possible bladder neoplasm  Additionally, there is obscuration of the soft tissue planes between   the vaginal cuff and posterior right bladder wall (best seen on series 2 image 102)  The possibility of potential neoplastic involvement of this region cannot be excluded  The daughter was going to bring in the disc from previous CT scan to compare the 2  For now they are going to forego any kind of procedure    Anemia  Assessment & Plan  - history of anemia, takes oral iron daily, hemoglobin 8 2  Hemoglobin is stable     History of DVT (deep vein thrombosis)  Assessment & Plan  I have restarted the patient's Coumadin today  She had offer 2 days for possible OR but they are not going to pursue with the OR at this time  This could be revaluated as an outpatient  INR has been therapeutic    Generalized weakness  Assessment & Plan  - patient has had a few days of generalized weakness, no her falls, will have PT OT evaluate, patient lives with son and is having trouble getting around the house and oldest son who was at bedside would like patient to be considered for short-term rehab  Of the plan is to go home or to rehab    Will discuss with case management    Benign hypertension with chronic kidney disease, stage III [facility])  Assessment & Plan  Creatinine 1 17 which appears at patient's baseline, blood pressure elevated on arrival will continue to monitor, does not appear like patient is on medication for hypertension, son does not have medication list with him, will continue to monitor blood pressure  Creatinine has improved this morning to 0 96    * Urinary tract infection associated with indwelling urethral catheter [facility])  Assessment & Plan  Patient has a history of a neurogenic bladder with an indwelling Smith catheter, was scheduled for urology follow-up outpatient 7/24  She has a few day history of cloudy urine, generalized weakness, and some episodes a hematuria last week  - UA showed innumerable bacteria  - creatinine 1 17, history of CKD 3  - CT of the abdomen showed hydroureternephrosis, with some bladder wall thickening and need for follow-up cystoscopy to rule out bladder cancer  Urology will evaluate the patient this afternoon  They asked to keep the patient off the Coumadin for now just in case anything as planned later this week  -will continue Rocephin 1 g Q 24 in follow-up urine culture  - in May patient had UTI with E coli and Proteus, most recent urine culture showed mixed contaminants  VTE Pharmacologic Prophylaxis:   Pharmacologic: Warfarin (Coumadin)  Mechanical VTE Prophylaxis in Place: Yes    Patient Centered Rounds: I have performed bedside rounds with nursing staff today  Discussions with Specialists or Other Care Team Provider:  Urology    Education and Discussions with Family / Patient:  Son as well as the daughter-in-law Elba Correa    Time Spent for Care: 30 minutes  More than 50% of total time spent on counseling and coordination of care as described above  Current Length of Stay: 3 day(s)    Current Patient Status: Inpatient   Certification Statement: The patient will continue to require additional inpatient hospital stay due to Needing safe discharge plan and urology plan    Patient is currently now waiting for rehab  Also monitoring patient's CBC and BMP    Discharge Plan:  Anticipate discharge hopefully in the next 24 hours    Code Status: Level 1 - Full Code      Subjective:   Patient seen examined  She has no complaints for self but appears little weak    Objective:     Vitals:   Temp (24hrs), Av 6 °F (37 °C), Min:98 4 °F (36 9 °C), Max:98 9 °F (37 2 °C)    Temp:  [98 4 °F (36 9 °C)-98 9 °F (37 2 °C)] 98 4 °F (36 9 °C)  HR:  [75-87] 75  Resp:  [18] 18  BP: (118-149)/(54-71) 118/71  SpO2:  [92 %-95 %] 92 %  Body mass index is 26 26 kg/m²  Input and Output Summary (last 24 hours): Intake/Output Summary (Last 24 hours) at 2020 1412  Last data filed at 2020 1222  Gross per 24 hour   Intake 590 ml   Output 2500 ml   Net -1910 ml       Physical Exam:     Physical Exam  (   General Appearance:    Alert, cooperative, no distress, appears stated age                               Lungs:     Clear to auscultation bilaterally, respirations unlabored       Heart:    Regular rate and rhythm, S1 and S2 normal, no murmur, rub    or gallop   Abdomen:     Soft, non-tender, bowel sounds active all four quadrants,     no masses, no organomegaly           Extremities:   Extremities normal, atraumatic, no cyanosis or edema       Additional Data:     Labs:    Results from last 7 days   Lab Units 20  04420  1053   WBC Thousand/uL 8 63   < > 8 28   HEMOGLOBIN g/dL 8 0*   < > 8 2*   HEMATOCRIT % 24 4*   < > 25 1*   PLATELETS Thousands/uL 107*   < > 123*   NEUTROS PCT %  --   --  75   LYMPHS PCT %  --   --  12*   MONOS PCT %  --   --  8   EOS PCT %  --   --  3    < > = values in this interval not displayed       Results from last 7 days   Lab Units 20  0444  20  0637   SODIUM mmol/L 133*   < > 132*   POTASSIUM mmol/L 3 5   < > 3 9   CHLORIDE mmol/L 100   < > 102   CO2 mmol/L 27   < > 21   BUN mg/dL 20   < > 18   CREATININE mg/dL 1 16   < > 0 96   ANION GAP mmol/L 6 < > 9   CALCIUM mg/dL 7 6*   < > 7 5*   ALBUMIN g/dL  --   --  2 0*   TOTAL BILIRUBIN mg/dL  --   --  0 50   ALK PHOS U/L  --   --  147*   ALT U/L  --   --  20   AST U/L  --   --  25   GLUCOSE RANDOM mg/dL 104   < > 85    < > = values in this interval not displayed  Results from last 7 days   Lab Units 06/25/20  0444   INR  2 19*             Results from last 7 days   Lab Units 06/22/20  1054 06/22/20  1053   LACTIC ACID mmol/L  --  0 8   PROCALCITONIN ng/ml 0 14  --            * I Have Reviewed All Lab Data Listed Above  * Additional Pertinent Lab Tests Reviewed: Anne 66 Admission Reviewed        Recent Cultures (last 7 days):     Results from last 7 days   Lab Units 06/22/20  1741 06/22/20  1054   BLOOD CULTURE   --  No Growth at 48 hrs  No Growth at 48 hrs  URINE CULTURE  >100,000 cfu/ml Gram Negative Darryl*  --        Last 24 Hours Medication List:     Current Facility-Administered Medications:  acetaminophen 650 mg Oral Q6H PRN [facility], DO    albuterol 2 puff Inhalation Q4H PRN [facility], DO    cefTRIAXone 1,000 mg Intravenous Q24H [facility], DO Last Rate: 1,000 mg (06/25/20 1150)   ferrous sulfate 325 mg Oral Daily With Breakfast [facility], DO    fluticasone 1 spray Nasal Daily PRN [facility], DO    pantoprazole 20 mg Oral Early Morning [facility], DO    polyethylene glycol 17 g Oral Daily PRN [facility], DO    sodium chloride 1 spray Each Nare Q1H PRN Emilie Rueda MD    sodium chloride 2 g Oral TID With Meals [facility], DO    warfarin 4 mg Oral Daily (warfarin) Emilie Rueda MD         Today, Patient Was Seen By: Emilie Rueda MD    ** Please Note: Dictation voice to text software may have been used in the creation of this document   **

## 2020-06-25 NOTE — PLAN OF CARE
Problem: Prexisting or High Potential for Compromised Skin Integrity  Goal: Skin integrity is maintained or improved  Description  INTERVENTIONS:  - Identify patients at risk for skin breakdown  - Assess and monitor skin integrity  - Assess and monitor nutrition and hydration status  - Monitor labs   - Assess for incontinence   - Turn and reposition patient  - Assist with mobility/ambulation  - Relieve pressure over bony prominences  - Avoid friction and shearing  - Provide appropriate hygiene as needed including keeping skin clean and dry  - Evaluate need for skin moisturizer/barrier cream  - Collaborate with interdisciplinary team   - Patient/family teaching  - Consider wound care consult   Outcome: Progressing     Problem: Potential for Falls  Goal: Patient will remain free of falls  Description  INTERVENTIONS:  - Assess patient frequently for physical needs  -  Identify cognitive and physical deficits and behaviors that affect risk of falls    -  [place] fall precautions as indicated by assessment   - Educate patient/family on patient safety including physical limitations  - Instruct patient to call for assistance with activity based on assessment  - Modify environment to reduce risk of injury  - Consider OT/PT consult to assist with strengthening/mobility  Outcome: Progressing     Problem: PAIN - ADULT  Goal: Verbalizes/displays adequate comfort level or baseline comfort level  Description  Interventions:  - Encourage patient to monitor pain and request assistance  - Assess pain using appropriate pain scale  - Administer analgesics based on type and severity of pain and evaluate response  - Implement non-pharmacological measures as appropriate and evaluate response  - Consider cultural and social influences on pain and pain management  - Notify physician/advanced practitioner if interventions unsuccessful or patient reports new pain  Outcome: Progressing     Problem: SAFETY ADULT  Goal: Maintain or return to baseline ADL function  Description  INTERVENTIONS:  -  Assess patient's ability to carry out ADLs; assess patient's baseline for ADL function and identify physical deficits which impact ability to perform ADLs (bathing, care of mouth/teeth, toileting, grooming, dressing, etc )  - Assess/evaluate cause of self-care deficits   - Assess range of motion  - Assess patient's mobility; develop plan if impaired  - Assess patient's need for assistive devices and provide as appropriate  - Encourage maximum independence but intervene and supervise when necessary  - Involve family in performance of ADLs  - Assess for home care needs following discharge   - Consider OT consult to assist with ADL evaluation and planning for discharge  - Provide patient education as appropriate  Outcome: Progressing  Goal: Maintain or return mobility status to optimal level  Description  INTERVENTIONS:  - Assess patient's baseline mobility status (ambulation, transfers, stairs, etc )    - Identify cognitive and physical deficits and behaviors that affect mobility  - Identify mobility aids required to assist with transfers and/or ambulation (gait belt, sit-to-stand, lift, walker, cane, etc )  - [place] fall precautions as indicated by assessment  - Record patient progress and toleration of activity level on Mobility SBAR; progress patient to next Phase/Stage  - Instruct patient to call for assistance with activity based on assessment  - Consider rehabilitation consult to assist with strengthening/weightbearing, etc   Outcome: Progressing     Problem: DISCHARGE PLANNING  Goal: Discharge to home or other facility with appropriate resources  Description  INTERVENTIONS:  - Identify barriers to discharge w/patient and caregiver  - Arrange for needed discharge resources and transportation as appropriate  - Identify discharge learning needs (meds, wound care, etc )  - Arrange for interpretive services to assist at discharge as needed  - Refer to Case Management Department for coordinating discharge planning if the patient needs post-hospital services based on physician/advanced practitioner order or complex needs related to functional status, cognitive ability, or social support system  Outcome: Progressing     Problem: Knowledge Deficit  Goal: Patient/family/caregiver demonstrates understanding of disease process, treatment plan, medications, and discharge instructions  Description  Complete learning assessment and assess knowledge base  Interventions:  - Provide teaching at level of understanding  - Provide teaching via preferred learning methods  Outcome: Progressing     Problem: Nutrition/Hydration-ADULT  Goal: Nutrient/Hydration intake appropriate for improving, restoring or maintaining nutritional needs  Description  Monitor and assess patient's nutrition/hydration status for malnutrition  Collaborate with interdisciplinary team and initiate plan and interventions as ordered  Monitor patient's weight and dietary intake as ordered or per policy  Utilize nutrition screening tool and intervene as necessary  Determine patient's food preferences and provide high-protein, high-caloric foods as appropriate       INTERVENTIONS:  - Monitor oral intake, urinary output, labs, and treatment plans  - Assess nutrition and hydration status and recommend course of action  - Evaluate amount of meals eaten  - Assist patient with eating if necessary   - Allow adequate time for meals  - Recommend/ encourage appropriate diets, oral nutritional supplements, and vitamin/mineral supplements  - Order, calculate, and assess calorie counts as needed  - Recommend, monitor, and adjust tube feedings based on assessed needs  - Assess need for intravenous fluids  - Provide  nutrition/hydration education as appropriate  - Include patient/family/caregiver in decisions related to nutrition   Outcome: Progressing

## 2020-06-25 NOTE — PROGRESS NOTES
Progress Note - Destiny Galvan 80 y o  female MRN: [id number]    Unit/Bed#: -01 Encounter: [id number]      Assessment:  Destiny Galvan is an 40-year-old female with history of breast and ovarian cancer, neurogenic bladder, daily CIC, recurrent UTI, prior gross hematuria, now resolved with new right hydronephrosis of unclear etiology and right UVJ soft tissue fullness, bladder wall thickening and suspicious pelvic lymphadenopathy suspicious for malignancy  Patient was made NPO and anticoagulation held in preparation for diagnostic cystoscopy, prasugrel TURBT and necessary procedures today  However, INR remains elevated at 2 19 despite holding Coumadin for history of factor 5  Furthermore, after lengthy discussion with both attending, Dr Daisy Tesfaye yesterday and now attending internal medicine service, Dr Karly Limon, family have decided against further  surgical instrumentation at this time  Plan:  Resume diet and anticoagulation  Our service will contact patient/caregiver with hospital follow-up  Family have expressed interest in reviewing outside imaging and further discussion in the office  This decision is not unreasonable considering patient is without renal colic and/or acute kidney injury  Subjective:   Denies fever, chills, flank, abdominal, suprapubic, or groin pain, dysuria or gross hematuria    Objective:     Vitals: Blood pressure 118/71, pulse 75, temperature 98 4 °F (36 9 °C), temperature source Oral, resp  rate 18, height 5' (1 524 m), weight 61 kg (134 lb 7 7 oz), SpO2 92 %  ,Body mass index is 26 26 kg/m²        Intake/Output Summary (Last 24 hours) at 6/25/2020 1030  Last data filed at 6/25/2020 0900  Gross per 24 hour   Intake 350 ml   Output 2150 ml   Net -1800 ml       Physical Exam: General appearance: alert and oriented, in no acute distress, appears stated age, cooperative and no distress  Head: Normocephalic, without obvious abnormality, atraumatic  Neck: no adenopathy, no carotid bruit, no JVD, supple, symmetrical, trachea midline and thyroid not enlarged, symmetric, no tenderness/mass/nodules  Lungs: diminished breath sounds  Heart: regular rate and rhythm, S1, S2 normal, no murmur, click, rub or gallop  Abdomen: soft, non-tender; bowel sounds normal; no masses,  no organomegaly  Extremities: extremities normal, warm and well-perfused; no cyanosis, clubbing, or edema  Pulses: 2+ and symmetric  Neurologic: Grossly normal  Smith patent for clear chucky urine     Invasive Devices     Peripheral Intravenous Line            Peripheral IV 06/22/20 Left Wrist 3 days    Peripheral IV 06/22/20 Left Antecubital 2 days          Drain            Urethral Catheter Non-latex 18 Fr  7 days              Lab Results   Component Value Date    WBC 8 63 06/25/2020    HGB 8 0 (L) 06/25/2020    HCT 24 4 (L) 06/25/2020    MCV 94 06/25/2020     (L) 06/25/2020       Lab Results   Component Value Date    SODIUM 133 (L) 06/25/2020    K 3 5 06/25/2020     06/25/2020    CO2 27 06/25/2020    BUN 20 06/25/2020    CREATININE 1 16 06/25/2020    GLUC 104 06/25/2020    CALCIUM 7 6 (L) 06/25/2020       Lab, Imaging and other studies: I have personally reviewed pertinent reports

## 2020-06-25 NOTE — ASSESSMENT & PLAN NOTE
CT abdomen showed: Moderate to severe right hydroureteronephrosis with  high attenuation seen within the collecting system, possibly related to hemorrhage or abnormal excretion of contrast from the right kidney related to previous CT study about 1 week ago  There is abrupt nonvisualization of the right ureter at the UVJ with associated soft tissue fullness and bladder wall thickening  Correlate with cystoscopy regarding possible bladder neoplasm  Additionally, there is obscuration of the soft tissue planes between   the vaginal cuff and posterior right bladder wall (best seen on series 2 image 102)  The possibility of potential neoplastic involvement of this region cannot be excluded  The daughter was going to bring in the disc from previous CT scan to compare the 2   For now they are going to forego any kind of procedure

## 2020-06-26 ENCOUNTER — TELEPHONE (OUTPATIENT)
Dept: OTHER | Facility: OTHER | Age: 85
End: 2020-06-26

## 2020-06-26 LAB
ANION GAP SERPL CALCULATED.3IONS-SCNC: 5 MMOL/L (ref 4–13)
BACTERIA UR CULT: ABNORMAL
BACTERIA UR CULT: ABNORMAL
BUN SERPL-MCNC: 20 MG/DL (ref 5–25)
CALCIUM SERPL-MCNC: 7.9 MG/DL (ref 8.3–10.1)
CHLORIDE SERPL-SCNC: 99 MMOL/L (ref 100–108)
CO2 SERPL-SCNC: 27 MMOL/L (ref 21–32)
CREAT SERPL-MCNC: 1.15 MG/DL (ref 0.6–1.3)
ERYTHROCYTE [DISTWIDTH] IN BLOOD BY AUTOMATED COUNT: 15.4 % (ref 11.6–15.1)
GFR SERPL CREATININE-BSD FRML MDRD: 42 ML/MIN/1.73SQ M
GLUCOSE SERPL-MCNC: 111 MG/DL (ref 65–140)
HCT VFR BLD AUTO: 23.8 % (ref 34.8–46.1)
HGB BLD-MCNC: 7.8 G/DL (ref 11.5–15.4)
INR PPP: 1.91 (ref 0.84–1.19)
MCH RBC QN AUTO: 30.6 PG (ref 26.8–34.3)
MCHC RBC AUTO-ENTMCNC: 32.8 G/DL (ref 31.4–37.4)
MCV RBC AUTO: 93 FL (ref 82–98)
PLATELET # BLD AUTO: 100 THOUSANDS/UL (ref 149–390)
PMV BLD AUTO: 10.2 FL (ref 8.9–12.7)
POTASSIUM SERPL-SCNC: 3.6 MMOL/L (ref 3.5–5.3)
PROTHROMBIN TIME: 22.1 SECONDS (ref 11.6–14.5)
RBC # BLD AUTO: 2.55 MILLION/UL (ref 3.81–5.12)
SODIUM SERPL-SCNC: 131 MMOL/L (ref 136–145)
WBC # BLD AUTO: 9.33 THOUSAND/UL (ref 4.31–10.16)

## 2020-06-26 PROCEDURE — 99233 SBSQ HOSP IP/OBS HIGH 50: CPT | Performed by: FAMILY MEDICINE

## 2020-06-26 PROCEDURE — 86300 IMMUNOASSAY TUMOR CA 15-3: CPT | Performed by: PHYSICIAN ASSISTANT

## 2020-06-26 PROCEDURE — 86300 IMMUNOASSAY TUMOR CA 15-3: CPT | Performed by: FAMILY MEDICINE

## 2020-06-26 PROCEDURE — 80048 BASIC METABOLIC PNL TOTAL CA: CPT | Performed by: FAMILY MEDICINE

## 2020-06-26 PROCEDURE — 99223 1ST HOSP IP/OBS HIGH 75: CPT | Performed by: PHYSICIAN ASSISTANT

## 2020-06-26 PROCEDURE — 86304 IMMUNOASSAY TUMOR CA 125: CPT | Performed by: FAMILY MEDICINE

## 2020-06-26 PROCEDURE — 85027 COMPLETE CBC AUTOMATED: CPT | Performed by: FAMILY MEDICINE

## 2020-06-26 PROCEDURE — 85610 PROTHROMBIN TIME: CPT | Performed by: FAMILY MEDICINE

## 2020-06-26 RX ORDER — FUROSEMIDE 10 MG/ML
20 INJECTION INTRAMUSCULAR; INTRAVENOUS ONCE
Status: COMPLETED | OUTPATIENT
Start: 2020-06-26 | End: 2020-06-26

## 2020-06-26 RX ORDER — WARFARIN SODIUM 5 MG/1
5 TABLET ORAL
Status: DISCONTINUED | OUTPATIENT
Start: 2020-06-26 | End: 2020-07-01

## 2020-06-26 RX ADMIN — CEFEPIME HYDROCHLORIDE 1000 MG: 1 INJECTION, POWDER, FOR SOLUTION INTRAMUSCULAR; INTRAVENOUS at 21:43

## 2020-06-26 RX ADMIN — Medication 2 G: at 09:02

## 2020-06-26 RX ADMIN — Medication 2 G: at 17:07

## 2020-06-26 RX ADMIN — WARFARIN SODIUM 5 MG: 5 TABLET ORAL at 17:07

## 2020-06-26 RX ADMIN — PANTOPRAZOLE SODIUM 20 MG: 20 TABLET, DELAYED RELEASE ORAL at 05:06

## 2020-06-26 RX ADMIN — Medication 2 G: at 12:07

## 2020-06-26 RX ADMIN — FUROSEMIDE 20 MG: 10 INJECTION, SOLUTION INTRAMUSCULAR; INTRAVENOUS at 12:07

## 2020-06-26 RX ADMIN — FERROUS SULFATE TAB 325 MG (65 MG ELEMENTAL FE) 325 MG: 325 (65 FE) TAB at 09:02

## 2020-06-26 RX ADMIN — CEFEPIME HYDROCHLORIDE 1000 MG: 1 INJECTION, POWDER, FOR SOLUTION INTRAMUSCULAR; INTRAVENOUS at 12:08

## 2020-06-26 NOTE — PLAN OF CARE
Problem: Prexisting or High Potential for Compromised Skin Integrity  Goal: Skin integrity is maintained or improved  Description  INTERVENTIONS:  - Identify patients at risk for skin breakdown  - Assess and monitor skin integrity  - Assess and monitor nutrition and hydration status  - Monitor labs   - Assess for incontinence   - Turn and reposition patient  - Assist with mobility/ambulation  - Relieve pressure over bony prominences  - Avoid friction and shearing  - Provide appropriate hygiene as needed including keeping skin clean and dry  - Evaluate need for skin moisturizer/barrier cream  - Collaborate with interdisciplinary team   - Patient/family teaching  - Consider wound care consult   Outcome: Progressing     Problem: Potential for Falls  Goal: Patient will remain free of falls  Description  INTERVENTIONS:  - Assess patient frequently for physical needs  -  Identify cognitive and physical deficits and behaviors that affect risk of falls    -  [place] fall precautions as indicated by assessment   - Educate patient/family on patient safety including physical limitations  - Instruct patient to call for assistance with activity based on assessment  - Modify environment to reduce risk of injury  - Consider OT/PT consult to assist with strengthening/mobility  Outcome: Progressing     Problem: PAIN - ADULT  Goal: Verbalizes/displays adequate comfort level or baseline comfort level  Description  Interventions:  - Encourage patient to monitor pain and request assistance  - Assess pain using appropriate pain scale  - Administer analgesics based on type and severity of pain and evaluate response  - Implement non-pharmacological measures as appropriate and evaluate response  - Consider cultural and social influences on pain and pain management  - Notify physician/advanced practitioner if interventions unsuccessful or patient reports new pain  Outcome: Progressing     Problem: SAFETY ADULT  Goal: Maintain or return to baseline ADL function  Description  INTERVENTIONS:  -  Assess patient's ability to carry out ADLs; assess patient's baseline for ADL function and identify physical deficits which impact ability to perform ADLs (bathing, care of mouth/teeth, toileting, grooming, dressing, etc )  - Assess/evaluate cause of self-care deficits   - Assess range of motion  - Assess patient's mobility; develop plan if impaired  - Assess patient's need for assistive devices and provide as appropriate  - Encourage maximum independence but intervene and supervise when necessary  - Involve family in performance of ADLs  - Assess for home care needs following discharge   - Consider OT consult to assist with ADL evaluation and planning for discharge  - Provide patient education as appropriate  Outcome: Progressing  Goal: Maintain or return mobility status to optimal level  Description  INTERVENTIONS:  - Assess patient's baseline mobility status (ambulation, transfers, stairs, etc )    - Identify cognitive and physical deficits and behaviors that affect mobility  - Identify mobility aids required to assist with transfers and/or ambulation (gait belt, sit-to-stand, lift, walker, cane, etc )  - [place] fall precautions as indicated by assessment  - Record patient progress and toleration of activity level on Mobility SBAR; progress patient to next Phase/Stage  - Instruct patient to call for assistance with activity based on assessment  - Consider rehabilitation consult to assist with strengthening/weightbearing, etc   Outcome: Progressing     Problem: DISCHARGE PLANNING  Goal: Discharge to home or other facility with appropriate resources  Description  INTERVENTIONS:  - Identify barriers to discharge w/patient and caregiver  - Arrange for needed discharge resources and transportation as appropriate  - Identify discharge learning needs (meds, wound care, etc )  - Arrange for interpretive services to assist at discharge as needed  - Refer to Case Management Department for coordinating discharge planning if the patient needs post-hospital services based on physician/advanced practitioner order or complex needs related to functional status, cognitive ability, or social support system  Outcome: Progressing     Problem: Knowledge Deficit  Goal: Patient/family/caregiver demonstrates understanding of disease process, treatment plan, medications, and discharge instructions  Description  Complete learning assessment and assess knowledge base  Interventions:  - Provide teaching at level of understanding  - Provide teaching via preferred learning methods  Outcome: Progressing     Problem: Nutrition/Hydration-ADULT  Goal: Nutrient/Hydration intake appropriate for improving, restoring or maintaining nutritional needs  Description  Monitor and assess patient's nutrition/hydration status for malnutrition  Collaborate with interdisciplinary team and initiate plan and interventions as ordered  Monitor patient's weight and dietary intake as ordered or per policy  Utilize nutrition screening tool and intervene as necessary  Determine patient's food preferences and provide high-protein, high-caloric foods as appropriate       INTERVENTIONS:  - Monitor oral intake, urinary output, labs, and treatment plans  - Assess nutrition and hydration status and recommend course of action  - Evaluate amount of meals eaten  - Assist patient with eating if necessary   - Allow adequate time for meals  - Recommend/ encourage appropriate diets, oral nutritional supplements, and vitamin/mineral supplements  - Order, calculate, and assess calorie counts as needed  - Recommend, monitor, and adjust tube feedings based on assessed needs  - Assess need for intravenous fluids  - Provide  nutrition/hydration education as appropriate  - Include patient/family/caregiver in decisions related to nutrition   Outcome: Progressing     Problem: GENITOURINARY - ADULT  Goal: Maintains or returns to baseline urinary function  Description  INTERVENTIONS:  - Assess urinary function  - Encourage oral fluids to ensure adequate hydration if ordered  - Administer IV fluids as ordered to ensure adequate hydration  - Administer ordered medications as needed  - Offer frequent toileting  - Follow urinary retention protocol if ordered  Outcome: Progressing  Goal: Urinary catheter remains patent  Description  INTERVENTIONS:  - Assess patency of urinary catheter  - If patient has a chronic lentz, consider changing catheter if non-functioning  - Follow guidelines for intermittent irrigation of non-functioning urinary catheter  Outcome: Progressing

## 2020-06-26 NOTE — ASSESSMENT & PLAN NOTE
I have restarted the patient's Coumadin today  She had offer 2 days for possible OR but they are not going to pursue with the OR at this time  Has a history of factor 5 Leiden deficiency  Restarted Coumadin yesterday  It is 1 91 today  Will give a little extra dose of Coumadin tonight and check another Coumadin level in the morning    If it is any lower I will bridge the patient

## 2020-06-26 NOTE — PROGRESS NOTES
Progress Note - Leslie Castellano 9/20/1930, 80 y o  female MRN: [id number]    Unit/Bed#: -01 Encounter: [id number]    Primary Care Provider: Ross Lucio DO   Date and time admitted to hospital: 6/22/2020 10:22 AM        Hydroureteronephrosis  Assessment & Plan  CT abdomen showed: Moderate to severe right hydroureteronephrosis with  high attenuation seen within the collecting system, possibly related to hemorrhage or abnormal excretion of contrast from the right kidney related to previous CT study about 1 week ago  There is abrupt nonvisualization of the right ureter at the UVJ with associated soft tissue fullness and bladder wall thickening  Correlate with cystoscopy regarding possible bladder neoplasm  Additionally, there is obscuration of the soft tissue planes between   the vaginal cuff and posterior right bladder wall (best seen on series 2 image 102)  The possibility of potential neoplastic involvement of this region cannot be excluded  We are not going to pursue any further treatment at this time as far as this goes secondary to risk of general anesthesia  Anemia  Assessment & Plan  - history of anemia, takes oral iron daily, hemoglobin 8 2  Hemoglobin is stable     History of DVT (deep vein thrombosis)  Assessment & Plan  I have restarted the patient's Coumadin today  She had offer 2 days for possible OR but they are not going to pursue with the OR at this time  Has a history of factor 5 Leiden deficiency  Restarted Coumadin yesterday  It is 1 91 today  Will give a little extra dose of Coumadin tonight and check another Coumadin level in the morning    If it is any lower I will bridge the patient    Generalized weakness  Assessment & Plan  - patient has had a few days of generalized weakness, no her falls, will have PT OT evaluate, patient lives with son and is having trouble getting around the house and oldest son who was at bedside would like patient to be considered for short-term rehab   Needs rehab but family is deciding what to do based on potential prognosis if this is worsening cancer  Benign hypertension with chronic kidney disease, stage III (HCC)  Assessment & Plan  Creatinine 1 17 which appears at patient's baseline, blood pressure elevated on arrival will continue to monitor, does not appear like patient is on medication for hypertension, son does not have medication list with him, will continue to monitor blood pressure  Creatinine has improved this morning to 0 96    * Urinary tract infection associated with indwelling urethral catheter [facility])  Assessment & Plan  Patient has a history of a neurogenic bladder with an indwelling Smith catheter, was scheduled for urology follow-up outpatient 7/24  She has a few day history of cloudy urine, generalized weakness, and some episodes a hematuria last week  - UA showed innumerable bacteria  - creatinine 1 17, history of CKD 3  - CT of the abdomen showed hydroureternephrosis, with some bladder wall thickening and need for follow-up cystoscopy to rule out bladder cancer  Urology will evaluate the patient this afternoon  They asked to keep the patient off the Coumadin for now just in case anything as planned later this week   -patient with multi drug resistance  I did change the patient over to cefepime       VTE Pharmacologic Prophylaxis:   Pharmacologic: Warfarin (Coumadin)  Mechanical VTE Prophylaxis in Place: Yes    Patient Centered Rounds: I have performed bedside rounds with nursing staff today  Discussions with Specialists or Other Care Team Provider:  Discussed with Oncology  Both are oncologist in the patient's oncologist    Education and Discussions with Family / Patient:  Daughter    Time Spent for Care:  35 minutes  More than 50% of total time spent on counseling and coordination of care as described above      Current Length of Stay: 4 day(s)    Current Patient Status: Inpatient   Certification Statement: The patient will continue to require additional inpatient hospital stay due to Having multi-drug resistant urinary tract infection in need of IV antibiotics    Discharge Plan:  Anticipate discharge Monday    Code Status: Level 1 - Full Code      Subjective:   Patient seen examined  No acute events reported    Objective:     Vitals:   Temp (24hrs), Av 7 °F (37 1 °C), Min:98 4 °F (36 9 °C), Max:98 9 °F (37 2 °C)    Temp:  [98 4 °F (36 9 °C)-98 9 °F (37 2 °C)] 98 4 °F (36 9 °C)  HR:  [82-86] 86  Resp:  [18-19] 18  BP: (140-152)/(65-70) 152/70  SpO2:  [90 %-94 %] 90 %  Body mass index is 26 26 kg/m²  Input and Output Summary (last 24 hours): Intake/Output Summary (Last 24 hours) at 2020 1418  Last data filed at 2020 0900  Gross per 24 hour   Intake 360 ml   Output 1950 ml   Net -1590 ml       Physical Exam:     Physical Exam  (   General Appearance:    Alert, cooperative, no distress, appears stated age                               Lungs:     Clear to auscultation bilaterally, respirations unlabored       Heart:    Regular rate and rhythm, S1 and S2 normal, no murmur, rub    or gallop   Abdomen:     Soft, non-tender, bowel sounds active all four quadrants,     no masses, no organomegaly           Extremities:   Extremities normal, atraumatic, no cyanosis or edema       Additional Data:     Labs:    Results from last 7 days   Lab Units 20  0437  20  1053   WBC Thousand/uL 9 33   < > 8 28   HEMOGLOBIN g/dL 7 8*   < > 8 2*   HEMATOCRIT % 23 8*   < > 25 1*   PLATELETS Thousands/uL 100*   < > 123*   NEUTROS PCT %  --   --  75   LYMPHS PCT %  --   --  12*   MONOS PCT %  --   --  8   EOS PCT %  --   --  3    < > = values in this interval not displayed       Results from last 7 days   Lab Units 20  0437  20  0637   SODIUM mmol/L 131*   < > 132*   POTASSIUM mmol/L 3 6   < > 3 9   CHLORIDE mmol/L 99*   < > 102   CO2 mmol/L 27   < > 21   BUN mg/dL 20   < > 18   CREATININE mg/dL 1 15   < > 0 96 ANION GAP mmol/L 5   < > 9   CALCIUM mg/dL 7 9*   < > 7 5*   ALBUMIN g/dL  --   --  2 0*   TOTAL BILIRUBIN mg/dL  --   --  0 50   ALK PHOS U/L  --   --  147*   ALT U/L  --   --  20   AST U/L  --   --  25   GLUCOSE RANDOM mg/dL 111   < > 85    < > = values in this interval not displayed  Results from last 7 days   Lab Units 06/26/20  0437   INR  1 91*             Results from last 7 days   Lab Units 06/22/20  1054 06/22/20  1053   LACTIC ACID mmol/L  --  0 8   PROCALCITONIN ng/ml 0 14  --            * I Have Reviewed All Lab Data Listed Above  * Additional Pertinent Lab Tests Reviewed: Anne 66 Admission Reviewed      Recent Cultures (last 7 days):     Results from last 7 days   Lab Units 06/22/20  1741 06/22/20  1054   BLOOD CULTURE   --  No Growth at 72 hrs  No Growth at 72 hrs  URINE CULTURE  >100,000 cfu/ml Enterobacter cloacae-[facility] *  10,000-19,000 cfu/ml Morganella morganii*  --        Last 24 Hours Medication List:     Current Facility-Administered Medications:  acetaminophen 650 mg Oral Q6H PRN Ingris Valdes DO    albuterol 2 puff Inhalation Q4H PRN Ingris Valdes DO    cefepime 1,000 mg Intravenous Q12H Eb Oh MD Last Rate: 1,000 mg (06/26/20 1208)   ferrous sulfate 325 mg Oral Daily With Breakfast Celia Piña, DO    fluticasone 1 spray Nasal Daily PRN Ingris Valdes DO    pantoprazole 20 mg Oral Early Morning Celia Piña DO    polyethylene glycol 17 g Oral Daily PRN Ingris Valdes DO    sodium chloride 1 spray Each Nare Q1H PRN Eb Oh MD    sodium chloride 2 g Oral TID With Meals Ingris Valdes DO    warfarin 5 mg Oral Daily (warfarin) Eb Oh MD         Today, Patient Was Seen By: Eb Oh MD    ** Please Note: Dictation voice to text software may have been used in the creation of this document   **

## 2020-06-26 NOTE — PLAN OF CARE
Problem: Prexisting or High Potential for Compromised Skin Integrity  Goal: Skin integrity is maintained or improved  Description  INTERVENTIONS:  - Identify patients at risk for skin breakdown  - Assess and monitor skin integrity  - Assess and monitor nutrition and hydration status  - Monitor labs   - Assess for incontinence   - Turn and reposition patient  - Assist with mobility/ambulation  - Relieve pressure over bony prominences  - Avoid friction and shearing  - Provide appropriate hygiene as needed including keeping skin clean and dry  - Evaluate need for skin moisturizer/barrier cream  - Collaborate with interdisciplinary team   - Patient/family teaching  - Consider wound care consult   Outcome: Progressing     Problem: Potential for Falls  Goal: Patient will remain free of falls  Description  INTERVENTIONS:  - Assess patient frequently for physical needs  -  Identify cognitive and physical deficits and behaviors that affect risk of falls    -  [place] fall precautions as indicated by assessment   - Educate patient/family on patient safety including physical limitations  - Instruct patient to call for assistance with activity based on assessment  - Modify environment to reduce risk of injury  - Consider OT/PT consult to assist with strengthening/mobility  Outcome: Progressing     Problem: PAIN - ADULT  Goal: Verbalizes/displays adequate comfort level or baseline comfort level  Description  Interventions:  - Encourage patient to monitor pain and request assistance  - Assess pain using appropriate pain scale  - Administer analgesics based on type and severity of pain and evaluate response  - Implement non-pharmacological measures as appropriate and evaluate response  - Consider cultural and social influences on pain and pain management  - Notify physician/advanced practitioner if interventions unsuccessful or patient reports new pain  Outcome: Progressing     Problem: SAFETY ADULT  Goal: Maintain or return to baseline ADL function  Description  INTERVENTIONS:  -  Assess patient's ability to carry out ADLs; assess patient's baseline for ADL function and identify physical deficits which impact ability to perform ADLs (bathing, care of mouth/teeth, toileting, grooming, dressing, etc )  - Assess/evaluate cause of self-care deficits   - Assess range of motion  - Assess patient's mobility; develop plan if impaired  - Assess patient's need for assistive devices and provide as appropriate  - Encourage maximum independence but intervene and supervise when necessary  - Involve family in performance of ADLs  - Assess for home care needs following discharge   - Consider OT consult to assist with ADL evaluation and planning for discharge  - Provide patient education as appropriate  Outcome: Progressing  Goal: Maintain or return mobility status to optimal level  Description  INTERVENTIONS:  - Assess patient's baseline mobility status (ambulation, transfers, stairs, etc )    - Identify cognitive and physical deficits and behaviors that affect mobility  - Identify mobility aids required to assist with transfers and/or ambulation (gait belt, sit-to-stand, lift, walker, cane, etc )  - [place] fall precautions as indicated by assessment  - Record patient progress and toleration of activity level on Mobility SBAR; progress patient to next Phase/Stage  - Instruct patient to call for assistance with activity based on assessment  - Consider rehabilitation consult to assist with strengthening/weightbearing, etc   Outcome: Progressing     Problem: DISCHARGE PLANNING  Goal: Discharge to home or other facility with appropriate resources  Description  INTERVENTIONS:  - Identify barriers to discharge w/patient and caregiver  - Arrange for needed discharge resources and transportation as appropriate  - Identify discharge learning needs (meds, wound care, etc )  - Arrange for interpretive services to assist at discharge as needed  - Refer to Case Management Department for coordinating discharge planning if the patient needs post-hospital services based on physician/advanced practitioner order or complex needs related to functional status, cognitive ability, or social support system  Outcome: Progressing     Problem: Knowledge Deficit  Goal: Patient/family/caregiver demonstrates understanding of disease process, treatment plan, medications, and discharge instructions  Description  Complete learning assessment and assess knowledge base  Interventions:  - Provide teaching at level of understanding  - Provide teaching via preferred learning methods  Outcome: Progressing     Problem: Nutrition/Hydration-ADULT  Goal: Nutrient/Hydration intake appropriate for improving, restoring or maintaining nutritional needs  Description  Monitor and assess patient's nutrition/hydration status for malnutrition  Collaborate with interdisciplinary team and initiate plan and interventions as ordered  Monitor patient's weight and dietary intake as ordered or per policy  Utilize nutrition screening tool and intervene as necessary  Determine patient's food preferences and provide high-protein, high-caloric foods as appropriate       INTERVENTIONS:  - Monitor oral intake, urinary output, labs, and treatment plans  - Assess nutrition and hydration status and recommend course of action  - Evaluate amount of meals eaten  - Assist patient with eating if necessary   - Allow adequate time for meals  - Recommend/ encourage appropriate diets, oral nutritional supplements, and vitamin/mineral supplements  - Order, calculate, and assess calorie counts as needed  - Recommend, monitor, and adjust tube feedings based on assessed needs  - Assess need for intravenous fluids  - Provide  nutrition/hydration education as appropriate  - Include patient/family/caregiver in decisions related to nutrition   Outcome: Progressing

## 2020-06-26 NOTE — SOCIAL WORK
[place] called the pt dtr-n-law Nayeli Fan [phone] to discuss the pt STR choices  She states that she would like the pt  and breast cancer markers to be taken in order to assess whether her cancer has recurred and then she will be able to make a decision about the pt going to rehab  She states that her main concern is that if the pt is not going to get better in rehab that she would not want her to be alone in a rehab, since they are not allowing any visitors  Nayeli Fan asked to speak with Shelia Muniz CM informed Shelia Muniz of the pt request

## 2020-06-26 NOTE — ASSESSMENT & PLAN NOTE
CT abdomen showed: Moderate to severe right hydroureteronephrosis with  high attenuation seen within the collecting system, possibly related to hemorrhage or abnormal excretion of contrast from the right kidney related to previous CT study about 1 week ago  There is abrupt nonvisualization of the right ureter at the UVJ with associated soft tissue fullness and bladder wall thickening  Correlate with cystoscopy regarding possible bladder neoplasm  Additionally, there is obscuration of the soft tissue planes between   the vaginal cuff and posterior right bladder wall (best seen on series 2 image 102)  The possibility of potential neoplastic involvement of this region cannot be excluded  We are not going to pursue any further treatment at this time as far as this goes secondary to risk of general anesthesia

## 2020-06-26 NOTE — CONSULTS
Hematology/Oncology Consult   Asim Salgado 80 y o  female MRN: [id number]  Unit/Bed#: -01 Encounter: [id number]      Hospital Physician Requesting Consult: Emilie Rueda MD  Reason for Consult:  History of breast cancer and ovarian cancer  Hematology/Oncology Supervising Physician: TAYLOR Chairez , PhD    HPI: Asim Salgado is a 80y o  year old female with a history of breast cancer and ovarian cancer and neurogenic bladder with chronic indwelling Smith who presented  6/22/2020  for  several day history of generalized weakness  Subsequent workup showed urinary tract infection  CT imaging showed moderate to severe right hydronephrosis, bladder wall thickening and enlarged left pelvic sidewall lymphadenopathy measuring approximately up to 2 4 x 1 3 cm  She was subsequently initiated on antibiotic therapy for UTI  Urology was consulted consideration of continued workup for bladder wall thickening with cystoscopy for a tissue diagnosis  Medical oncology is consulted for the patient's history of breast and ovarian carcinoma now possible bladder malignancy versus metastatic disease  Patient's history includes breast and ovarian carcinoma  She is followed by Dr Alex Skelton at [facility] oncology  Ovarian carcinoma was diagnosed in 2005, she subsequently underwent surgery followed by chemotherapy  In 2015 she was diagnosed with left breast cancer  She underwent lobectomy and declined further adjuvant radiotherapy     She had further recurrence of left breast cancer in 2019, again underwent lumpectomy and follow through with adjuvant radiotherapy completing in 09/2019  Patient was feeling well up until approximately January of 2020 when she began to feel more fatigued and lost approximately 25 lb  CT imaging was performed by her primary oncologist in May of 2020 and per family report did not demonstrate any abnormality but noted her tumor markers,  have been increasing for several months      Patient Active Problem List   Diagnosis    Mixed dyslipidemia    Benign hypertension with chronic kidney disease, stage III (HCC)    CKD (chronic kidney disease) stage 3, GFR 30-59 ml/min (HCC)    UTI (urinary tract infection)    Hypoxia    Generalized weakness    Neurogenic bladder    History of DVT (deep vein thrombosis)    Rib fractures    Anemia    Subtherapeutic international normalized ratio (INR)    RAINE (acute kidney injury) ([facility] [address] )    Hyponatremia    Hyperkalemia    Hematuria    Urinary tract infection associated with indwelling urethral catheter (HCC)    Hydroureteronephrosis        Assessment/Plan:   #1 Bladder wall thickening/pelvic LAD  #2 History of ovarian and breast carcinoma  On admission, patient was found with the UTI as well as lymphadenopathy and bladder wall thickening possibly concerning for malignancy  Urology was consulted regarding cystoscopy and TURBT for diagnosing the abnormality  However follow-up discussion with the patient's family, Otis Mack daughter-in-law, decided to wait to see if the patient's tumor markers continue to rise and therefore not put the patient through a biopsy to determine the likelihood of progression of prior breast or ovarian carcinoma versus possible new malignancy  I spoke with the patient's daughter-in-law Otis Mack as well, she does not want the patient to go through chemotherapy or invasive procedures at this time unless we are unclear on a diagnosis  She prefers to follow the tumor markers for now and discuss further with the patient's primary oncologist, Dr Hiro Rose     -I have faxed a copy of the CT imaging study from this admission to the patient's oncologist, javier Arauz's request   -we have received a CD with the outside scan from 5/2020 to try to determine the rate of progression as well   -family continues to decide on how to proceed whether to pursue biopsy this time or pursue palliative treatment going forward      #3 Anemia, normocytic  Admission hemoglobin 8 2 grams/deciliter/MCV 94  Iron studies are satisfactory on 6/16/2020 on prior recent admission  Therefore she does not require repletion of iron  Recommend continue to monitor hemoglobin if less than 7 5 grams/deciliter or ongoing hematuria or blood loss or symptomatic transfuse 1 unit PRBC  Discussed with the primary team (Massimo Campos) 6/26/2020  Please contact us if you have any questions regarding this consult  Thank you for this consult  Past Medical History:   Diagnosis Date    Cancer ([place] [address] )     BREAST , OVARIAN    Factor 5 Leiden mutation, heterozygous ([place] [address] )     Hypertension      History reviewed  No pertinent family history  Social History     Socioeconomic History    Marital status:      Spouse name: None    Number of children: None    Years of education: None    Highest education level: None   Occupational History    None   Social Needs    Financial resource strain: None    Food insecurity:     Worry: None     Inability: None    Transportation needs:     Medical: None     Non-medical: None   Tobacco Use    Smoking status: Former Smoker    Smokeless tobacco: Never Used   Substance and Sexual Activity    Alcohol use:  Yes     Alcohol/week: 1 0 - 2 0 standard drinks     Types: 1 - 2 Glasses of wine per week    Drug use: No    Sexual activity: None   Lifestyle    Physical activity:     Days per week: None     Minutes per session: None    Stress: None   Relationships    Social connections:     Talks on phone: None     Gets together: None     Attends Muslim service: None     Active member of club or organization: None     Attends meetings of clubs or organizations: None     Relationship status: None    Intimate partner violence:     Fear of current or ex partner: None     Emotionally abused: None     Physically abused: None     Forced sexual activity: None   Other Topics Concern    None   Social History Narrative    None     Allergies   Allergen Reactions    Amoxicillin      Pt does not remember     Ciprofloxacin     Penicillins        Subjective:  Shree Beaver reports:   Today, the patient reports she is fatigued but otherwise does not have any pain or discomfort  Denies any fever chills  She has a very poor appetite, but denies nausea vomiting  Although she did not seem overtly confused she was a very poor historian with regard to her cancer history and history of present illness and reasoning for hospital admission  The history was largely obtained from the patient's daughter-in-law, Alysia Stern via telephone  Review of Systems   Constitutional: Positive for appetite change and fatigue  Negative for fever  HENT:   Negative for sore throat and trouble swallowing  Eyes: Negative for icterus  Respiratory: Negative for cough and shortness of breath  Cardiovascular: Negative for chest pain and leg swelling  Gastrointestinal: Negative for abdominal pain, nausea and vomiting  Genitourinary: Positive for dysuria (Denies)  Musculoskeletal: Negative for myalgias  Skin: Negative for rash  Hematological: Does not bruise/bleed easily  Psychiatric/Behavioral: Negative for confusion (Denies)  All other systems reviewed and are negative  The remainder of a 12 point review of systems was negative  Objective:  /70 (BP Location: Right arm)   Pulse 86   Temp 98 4 °F (36 9 °C) (Oral)   Resp 18   Ht 5' (1 524 m)   Wt 61 kg (134 lb 7 7 oz)   SpO2 90%   BMI 26 26 kg/m²     Physical Exam   Constitutional: She is cooperative  She appears ill  No distress  Patient is cooperative but disinterested in ongoing discussion regarding her current and medical history   Eyes: Conjunctivae are normal  No scleral icterus  Neck: Normal range of motion  Cardiovascular: Normal rate  Pulmonary/Chest: Effort normal  No respiratory distress  She has no wheezes  Abdominal: Soft  She exhibits no distension  There is no tenderness     No suprapubic tenderness on palpation   Musculoskeletal: She exhibits no edema or tenderness  Lymphadenopathy:     She has no cervical adenopathy  Neurological: She is alert  Oriented to person and place  Not clear on reason for admission or current date   Skin: Skin is warm and dry  No rash noted  No pallor  Vitals reviewed  Current Facility-Administered Medications:     acetaminophen (TYLENOL) tablet 650 mg, 650 mg, Oral, Q6H PRN, Melneelima [place], DO    albuterol (PROVENTIL HFA,VENTOLIN HFA) inhaler 2 puff, 2 puff, Inhalation, Q4H PRN, Melneelima [place], DO    cefepime (MAXIPIME) 1,000 mg in dextrose 5 % 50 mL IVPB, 1,000 mg, Intravenous, Q12H, Francisco Noonan MD    ferrous sulfate tablet 325 mg, 325 mg, Oral, Daily With Breakfast, Celia Piña DO, 325 mg at 06/26/20 0902    fluticasone (FLONASE) 50 mcg/act nasal spray 1 spray, 1 spray, Nasal, Daily PRN, Jia Sow, DO    furosemide (LASIX) injection 20 mg, 20 mg, Intravenous, Once, Francisco Noonan MD    pantoprazole (PROTONIX) EC tablet 20 mg, 20 mg, Oral, Early Morning, Celia Piña DO, 20 mg at 06/26/20 0506    polyethylene glycol (MIRALAX) packet 17 g, 17 g, Oral, Daily PRN, Jia [place], DO    sodium chloride (OCEAN) 0 65 % nasal spray 1 spray, 1 spray, Each Nare, Q1H PRN, Francisco Noonan MD    sodium chloride tablet 2 g, 2 g, Oral, TID With Meals, Jia Sow DO, 2 g at 06/26/20 0902    warfarin (COUMADIN) tablet 4 mg, 4 mg, Oral, Daily (warfarin), Francisco Noonan MD, 4 mg at 06/25/20 1815      Laboratory studies:  Recent Labs     06/24/20  0435 06/25/20  0444 06/26/20  0437   WBC 8 93 8 63 9 33   HGB 7 9* 8 0* 7 8*   * 107* 100*   MCV 93 94 93   RDW 15 4* 15 4* 15 4*   CREATININE 1 16 1 16 1 15         Laboratory studies were reviewed    Imaging Studies:    [unfilled]     Pathology: [unfilled]     Code Status: Level 1 - Full Code    Counseling / Coordination of Care  Total floor / unit time spent today 45 minutes   Greater than 50% of total time was spent with the patient and / or family counseling and / or coordination of care

## 2020-06-26 NOTE — ASSESSMENT & PLAN NOTE
- patient has had a few days of generalized weakness, no her falls, will have PT OT evaluate, patient lives with son and is having trouble getting around the house and oldest son who was at bedside would like patient to be considered for short-term rehab  Needs rehab but family is deciding what to do based on potential prognosis if this is worsening cancer

## 2020-06-26 NOTE — ASSESSMENT & PLAN NOTE
Patient has a history of a neurogenic bladder with an indwelling Smith catheter, was scheduled for urology follow-up outpatient 7/24  She has a few day history of cloudy urine, generalized weakness, and some episodes a hematuria last week  - UA showed innumerable bacteria  - creatinine 1 17, history of CKD 3  - CT of the abdomen showed hydroureternephrosis, with some bladder wall thickening and need for follow-up cystoscopy to rule out bladder cancer  Urology will evaluate the patient this afternoon  They asked to keep the patient off the Coumadin for now just in case anything as planned later this week   -patient with multi drug resistance    I did change the patient over to cefepime

## 2020-06-27 ENCOUNTER — APPOINTMENT (INPATIENT)
Dept: CT IMAGING | Facility: HOSPITAL | Age: 85
DRG: 698 | End: 2020-06-27
Payer: MEDICARE

## 2020-06-27 PROBLEM — J96.01 ACUTE RESPIRATORY FAILURE WITH HYPOXEMIA (HCC): Status: ACTIVE | Noted: 2020-06-27

## 2020-06-27 LAB
ANION GAP SERPL CALCULATED.3IONS-SCNC: 9 MMOL/L (ref 4–13)
BACTERIA BLD CULT: NORMAL
BACTERIA BLD CULT: NORMAL
BUN SERPL-MCNC: 22 MG/DL (ref 5–25)
CALCIUM SERPL-MCNC: 7.8 MG/DL (ref 8.3–10.1)
CANCER AG125 SERPL-ACNC: 175.6 U/ML (ref 0–30)
CANCER AG15-3 SERPL-ACNC: 185 U/ML (ref 0–25)
CANCER AG27-29 SERPL-ACNC: 363.2 U/ML (ref 0–42.3)
CHLORIDE SERPL-SCNC: 99 MMOL/L (ref 100–108)
CO2 SERPL-SCNC: 26 MMOL/L (ref 21–32)
CREAT SERPL-MCNC: 1.1 MG/DL (ref 0.6–1.3)
ERYTHROCYTE [DISTWIDTH] IN BLOOD BY AUTOMATED COUNT: 15.2 % (ref 11.6–15.1)
GFR SERPL CREATININE-BSD FRML MDRD: 45 ML/MIN/1.73SQ M
GLUCOSE SERPL-MCNC: 105 MG/DL (ref 65–140)
GLUCOSE SERPL-MCNC: 164 MG/DL (ref 65–140)
HCT VFR BLD AUTO: 24.1 % (ref 34.8–46.1)
HGB BLD-MCNC: 8 G/DL (ref 11.5–15.4)
INR PPP: 1.93 (ref 0.84–1.19)
MCH RBC QN AUTO: 30.8 PG (ref 26.8–34.3)
MCHC RBC AUTO-ENTMCNC: 33.2 G/DL (ref 31.4–37.4)
MCV RBC AUTO: 93 FL (ref 82–98)
PLATELET # BLD AUTO: 89 THOUSANDS/UL (ref 149–390)
PMV BLD AUTO: 10.4 FL (ref 8.9–12.7)
POTASSIUM SERPL-SCNC: 3.5 MMOL/L (ref 3.5–5.3)
PROTHROMBIN TIME: 22.3 SECONDS (ref 11.6–14.5)
RBC # BLD AUTO: 2.6 MILLION/UL (ref 3.81–5.12)
SARS-COV-2 RNA RESP QL NAA+PROBE: NEGATIVE
SODIUM SERPL-SCNC: 134 MMOL/L (ref 136–145)
WBC # BLD AUTO: 9.94 THOUSAND/UL (ref 4.31–10.16)

## 2020-06-27 PROCEDURE — 87635 SARS-COV-2 COVID-19 AMP PRB: CPT | Performed by: FAMILY MEDICINE

## 2020-06-27 PROCEDURE — 80048 BASIC METABOLIC PNL TOTAL CA: CPT | Performed by: FAMILY MEDICINE

## 2020-06-27 PROCEDURE — 99232 SBSQ HOSP IP/OBS MODERATE 35: CPT | Performed by: FAMILY MEDICINE

## 2020-06-27 PROCEDURE — 85610 PROTHROMBIN TIME: CPT | Performed by: FAMILY MEDICINE

## 2020-06-27 PROCEDURE — 85027 COMPLETE CBC AUTOMATED: CPT | Performed by: FAMILY MEDICINE

## 2020-06-27 PROCEDURE — 82948 REAGENT STRIP/BLOOD GLUCOSE: CPT

## 2020-06-27 PROCEDURE — 71275 CT ANGIOGRAPHY CHEST: CPT

## 2020-06-27 RX ORDER — SODIUM CHLORIDE 9 MG/ML
75 INJECTION, SOLUTION INTRAVENOUS CONTINUOUS
Status: DISPENSED | OUTPATIENT
Start: 2020-06-27 | End: 2020-06-27

## 2020-06-27 RX ADMIN — FERROUS SULFATE TAB 325 MG (65 MG ELEMENTAL FE) 325 MG: 325 (65 FE) TAB at 08:20

## 2020-06-27 RX ADMIN — SODIUM CHLORIDE 75 ML/HR: 0.9 INJECTION, SOLUTION INTRAVENOUS at 17:50

## 2020-06-27 RX ADMIN — CEFEPIME HYDROCHLORIDE 1000 MG: 1 INJECTION, POWDER, FOR SOLUTION INTRAMUSCULAR; INTRAVENOUS at 09:53

## 2020-06-27 RX ADMIN — PANTOPRAZOLE SODIUM 20 MG: 20 TABLET, DELAYED RELEASE ORAL at 06:53

## 2020-06-27 RX ADMIN — CEFEPIME HYDROCHLORIDE 1000 MG: 1 INJECTION, POWDER, FOR SOLUTION INTRAMUSCULAR; INTRAVENOUS at 23:00

## 2020-06-27 RX ADMIN — FLUTICASONE PROPIONATE 1 SPRAY: 50 SPRAY, METERED NASAL at 03:31

## 2020-06-27 RX ADMIN — Medication 2 G: at 13:25

## 2020-06-27 RX ADMIN — WARFARIN SODIUM 5 MG: 5 TABLET ORAL at 17:50

## 2020-06-27 RX ADMIN — IOHEXOL 100 ML: 350 INJECTION, SOLUTION INTRAVENOUS at 12:38

## 2020-06-27 RX ADMIN — Medication 2 G: at 08:20

## 2020-06-27 RX ADMIN — Medication 2 G: at 17:50

## 2020-06-27 NOTE — ASSESSMENT & PLAN NOTE
Creatinine 1 17 which appears at patient's baseline, blood pressure elevated on arrival will continue to monitor, does not appear like patient is on medication for hypertension, son does not have medication list with him, will continue to monitor blood pressure  Creatinine has improved this morning to 0 96    From this standpoint patient is stable

## 2020-06-27 NOTE — ASSESSMENT & PLAN NOTE
Patient has been requiring oxygen here  They did bump her up to 5 L  The patient is not a good historian so it is hard to say words coming from but I will check a CT scan of the chest   I will put the patient on gentle hydration as well

## 2020-06-27 NOTE — PROGRESS NOTES
Progress Note - [facility] 9/20/1930, 80 y o  female MRN: [id number]    Unit/Bed#: -01 Encounter: [id number]    Primary Care Provider: Singh Hernandez DO   Date and time admitted to hospital: 6/22/2020 10:22 AM        Acute respiratory failure with hypoxemia [facility])  Assessment & Plan  Patient has been requiring oxygen here  They did bump her up to 5 L  The patient is not a good historian so it is hard to say words coming from but I will check a CT scan of the chest   I will put the patient on gentle hydration as well  Hydroureteronephrosis  Assessment & Plan  CT abdomen showed: Moderate to severe right hydroureteronephrosis with  high attenuation seen within the collecting system, possibly related to hemorrhage or abnormal excretion of contrast from the right kidney related to previous CT study about 1 week ago  There is abrupt nonvisualization of the right ureter at the UVJ with associated soft tissue fullness and bladder wall thickening  Correlate with cystoscopy regarding possible bladder neoplasm  Additionally, there is obscuration of the soft tissue planes between   the vaginal cuff and posterior right bladder wall (best seen on series 2 image 102)  The possibility of potential neoplastic involvement of this region cannot be excluded  We are not going to pursue any further treatment at this time as far as this goes secondary to risk of general anesthesia  Breezy Manifold However patient does have a history of ovarian cancer as well as breast cancer  I did speak to the patient's primary oncologist as well as our Oncology team OB here  Family would like to know if this could be recurrence of either 1 of the cancers  Follow-up with the tumor markers  Family recommends Dr Noah Bingham see the patient on Monday and see what his recommendations are  If it is recurrence the patient may want to go home and not go to rehab  They are aware she is not a radiation or chemo candidate    Dr Noah Bingham the patient's original ovarian cancer treatment years ago at [address]  - history of anemia, takes oral iron daily, hemoglobin 8 2  Hemoglobin is stable at 8    History of DVT (deep vein thrombosis)  Assessment & Plan  I have restarted the patient's Coumadin today  Patient has a history of factor 5 Leiden  INR slightly subtherapeutic  If it is no different tomorrow will put on bridging    Generalized weakness  Assessment & Plan  - patient has had a few days of generalized weakness, no her falls, will have PT OT evaluate, patient lives with son and is having trouble getting around the house and oldest son who was at bedside would like patient to be considered for short-term rehab  Needs rehab but family is deciding what to do based on potential prognosis if this is worsening cancer  Benign hypertension with chronic kidney disease, stage III (HCC)  Assessment & Plan  Creatinine 1 17 which appears at patient's baseline, blood pressure elevated on arrival will continue to monitor, does not appear like patient is on medication for hypertension, son does not have medication list with him, will continue to monitor blood pressure  Creatinine has improved this morning to 0 96  From this standpoint patient is stable    * Urinary tract infection associated with indwelling urethral catheter [facility])  Assessment & Plan  Patient has a history of a neurogenic bladder with an indwelling Smith catheter, was scheduled for urology follow-up outpatient 7/24  She has a few day history of cloudy urine, generalized weakness, and some episodes a hematuria last week  - UA showed innumerable bacteria  - creatinine 1 17, history of CKD 3  - CT of the abdomen showed hydroureternephrosis, with some bladder wall thickening and need for follow-up cystoscopy to rule out bladder cancer  Urology will evaluate the patient this afternoon  Coumadin restarted  Can likely discontinue the antibiotics    It is likely all colonization  VTE Pharmacologic Prophylaxis:   Pharmacologic: Warfarin (Coumadin)  Mechanical VTE Prophylaxis in Place: Yes    Patient Centered Rounds: I have performed bedside rounds with nursing staff today  Discussions with Specialists or Other Care Team Provider:  Discussed with oncology yesterday    Education and Discussions with Family / Patient:  Discussed with son and sister-in-law    Time Spent for Care: 20 minutes  More than 50% of total time spent on counseling and coordination of care as described above  Current Length of Stay: 5 day(s)    Current Patient Status: Inpatient   Certification Statement: The patient will continue to require additional inpatient hospital stay due to Being hypoxic in need of further evaluation and also likely needing rehab and plan for Oncology    Discharge Plan:  Patient will be here another 48 hours    Code Status: Level 1 - Full Code      Subjective:   Patient seen examined  Had increased oxygen demands last night  Has been coughing this morning    Objective:     Vitals:   Temp (24hrs), Av 7 °F (37 1 °C), Min:98 1 °F (36 7 °C), Max:99 °F (37 2 °C)    Temp:  [98 1 °F (36 7 °C)-99 °F (37 2 °C)] 98 1 °F (36 7 °C)  HR:  [88-93] 89  Resp:  [18-21] 18  BP: (118-139)/(59-70) 118/59  SpO2:  [91 %-93 %] 93 %  Body mass index is 26 26 kg/m²  Input and Output Summary (last 24 hours):        Intake/Output Summary (Last 24 hours) at 2020 1255  Last data filed at 2020 1249  Gross per 24 hour   Intake 1229 ml   Output 1925 ml   Net -696 ml       Physical Exam:     Physical Exam  (   General Appearance:    Alert, cooperative, no distress, appears stated age                               Lungs:     Decreased breath sounds at the bases       Heart:    Regular rate and rhythm, S1 and S2 normal, no murmur, rub    or gallop   Abdomen:     Soft, non-tender, bowel sounds active all four quadrants,     no masses, no organomegaly           Extremities:   Extremities normal, atraumatic, no cyanosis or edema       Additional Data:     Labs:    Results from last 7 days   Lab Units 06/27/20  0550  06/22/20  1053   WBC Thousand/uL 9 94   < > 8 28   HEMOGLOBIN g/dL 8 0*   < > 8 2*   HEMATOCRIT % 24 1*   < > 25 1*   PLATELETS Thousands/uL 89*   < > 123*   NEUTROS PCT %  --   --  75   LYMPHS PCT %  --   --  12*   MONOS PCT %  --   --  8   EOS PCT %  --   --  3    < > = values in this interval not displayed  Results from last 7 days   Lab Units 06/27/20  0550  06/23/20  0637   SODIUM mmol/L 134*   < > 132*   POTASSIUM mmol/L 3 5   < > 3 9   CHLORIDE mmol/L 99*   < > 102   CO2 mmol/L 26   < > 21   BUN mg/dL 22   < > 18   CREATININE mg/dL 1 10   < > 0 96   ANION GAP mmol/L 9   < > 9   CALCIUM mg/dL 7 8*   < > 7 5*   ALBUMIN g/dL  --   --  2 0*   TOTAL BILIRUBIN mg/dL  --   --  0 50   ALK PHOS U/L  --   --  147*   ALT U/L  --   --  20   AST U/L  --   --  25   GLUCOSE RANDOM mg/dL 105   < > 85    < > = values in this interval not displayed  Results from last 7 days   Lab Units 06/27/20  0550   INR  1 93*             Results from last 7 days   Lab Units 06/22/20  1054 06/22/20  1053   LACTIC ACID mmol/L  --  0 8   PROCALCITONIN ng/ml 0 14  --            * I Have Reviewed All Lab Data Listed Above  * Additional Pertinent Lab Tests Reviewed: [facility]        Recent Cultures (last 7 days):     Results from last 7 days   Lab Units 06/22/20  1741 06/22/20  1054   BLOOD CULTURE   --  No Growth After 4 Days  No Growth After 4 Days     URINE CULTURE  >100,000 cfu/ml Enterobacter cloacae-[facility] *  10,000-19,000 cfu/ml Morganella morganii*  --        Last 24 Hours Medication List:     Current Facility-Administered Medications:  acetaminophen 650 mg Oral Q6H PRN Gagan Russo, DO    albuterol 2 puff Inhalation Q4H PRN Gagan Russo, DO    cefepime 1,000 mg Intravenous Q12H Marco Jarrell MD Last Rate: 1,000 mg (06/27/20 0953)   ferrous sulfate 325 mg Oral Daily With Breakfast Celia Ayana, DO    fluticasone 1 spray Nasal Daily PRN Huey Curd, DO    pantoprazole 20 mg Oral Early Morning Celia Ayana, DO    polyethylene glycol 17 g Oral Daily PRN Huey Curd, DO    sodium chloride 1 spray Each Nare Q1H PRN Alix Lala MD    sodium chloride 2 g Oral TID With Meals Huey Curd, DO    warfarin 5 mg Oral Daily (warfarin) Alix Lala MD         Today, Patient Was Seen By: Alix Lala MD    ** Please Note: Dictation voice to text software may have been used in the creation of this document   **

## 2020-06-27 NOTE — ASSESSMENT & PLAN NOTE
I have restarted the patient's Coumadin today  Patient has a history of factor 5 Leiden  INR slightly subtherapeutic    If it is no different tomorrow will put on bridging

## 2020-06-27 NOTE — ASSESSMENT & PLAN NOTE
CT abdomen showed: Moderate to severe right hydroureteronephrosis with  high attenuation seen within the collecting system, possibly related to hemorrhage or abnormal excretion of contrast from the right kidney related to previous CT study about 1 week ago  There is abrupt nonvisualization of the right ureter at the UVJ with associated soft tissue fullness and bladder wall thickening  Correlate with cystoscopy regarding possible bladder neoplasm  Additionally, there is obscuration of the soft tissue planes between   the vaginal cuff and posterior right bladder wall (best seen on series 2 image 102)  The possibility of potential neoplastic involvement of this region cannot be excluded  We are not going to pursue any further treatment at this time as far as this goes secondary to risk of general anesthesia  Hernandez Side However patient does have a history of ovarian cancer as well as breast cancer  I did speak to the patient's primary oncologist as well as our Oncology team OB here  Family would like to know if this could be recurrence of either 1 of the cancers  Follow-up with the tumor markers  Family recommends Dr Shane Snowden see the patient on Monday and see what his recommendations are  If it is recurrence the patient may want to go home and not go to rehab  They are aware she is not a radiation or chemo candidate    Dr Shane Snowden the patient's original ovarian cancer treatment years ago at [facility]

## 2020-06-27 NOTE — ASSESSMENT & PLAN NOTE
Patient has a history of a neurogenic bladder with an indwelling Smith catheter, was scheduled for urology follow-up outpatient 7/24  She has a few day history of cloudy urine, generalized weakness, and some episodes a hematuria last week  - UA showed innumerable bacteria  - creatinine 1 17, history of CKD 3  - CT of the abdomen showed hydroureternephrosis, with some bladder wall thickening and need for follow-up cystoscopy to rule out bladder cancer  Urology will evaluate the patient this afternoon  Coumadin restarted  Can likely discontinue the antibiotics  It is likely all colonization

## 2020-06-27 NOTE — PLAN OF CARE
Problem: Prexisting or High Potential for Compromised Skin Integrity  Goal: Skin integrity is maintained or improved  Description  INTERVENTIONS:  - Identify patients at risk for skin breakdown  - Assess and monitor skin integrity  - Assess and monitor nutrition and hydration status  - Monitor labs   - Assess for incontinence   - Turn and reposition patient  - Assist with mobility/ambulation  - Relieve pressure over bony prominences  - Avoid friction and shearing  - Provide appropriate hygiene as needed including keeping skin clean and dry  - Evaluate need for skin moisturizer/barrier cream  - Collaborate with interdisciplinary team   - Patient/family teaching  - Consider wound care consult   Outcome: Progressing     Problem: Potential for Falls  Goal: Patient will remain free of falls  Description  INTERVENTIONS:  - Assess patient frequently for physical needs  -  Identify cognitive and physical deficits and behaviors that affect risk of falls    -  [place] fall precautions as indicated by assessment   - Educate patient/family on patient safety including physical limitations  - Instruct patient to call for assistance with activity based on assessment  - Modify environment to reduce risk of injury  - Consider OT/PT consult to assist with strengthening/mobility  Outcome: Progressing     Problem: PAIN - ADULT  Goal: Verbalizes/displays adequate comfort level or baseline comfort level  Description  Interventions:  - Encourage patient to monitor pain and request assistance  - Assess pain using appropriate pain scale  - Administer analgesics based on type and severity of pain and evaluate response  - Implement non-pharmacological measures as appropriate and evaluate response  - Consider cultural and social influences on pain and pain management  - Notify physician/advanced practitioner if interventions unsuccessful or patient reports new pain  Outcome: Progressing     Problem: SAFETY ADULT  Goal: Maintain or return to baseline ADL function  Description  INTERVENTIONS:  -  Assess patient's ability to carry out ADLs; assess patient's baseline for ADL function and identify physical deficits which impact ability to perform ADLs (bathing, care of mouth/teeth, toileting, grooming, dressing, etc )  - Assess/evaluate cause of self-care deficits   - Assess range of motion  - Assess patient's mobility; develop plan if impaired  - Assess patient's need for assistive devices and provide as appropriate  - Encourage maximum independence but intervene and supervise when necessary  - Involve family in performance of ADLs  - Assess for home care needs following discharge   - Consider OT consult to assist with ADL evaluation and planning for discharge  - Provide patient education as appropriate  Outcome: Progressing  Goal: Maintain or return mobility status to optimal level  Description  INTERVENTIONS:  - Assess patient's baseline mobility status (ambulation, transfers, stairs, etc )    - Identify cognitive and physical deficits and behaviors that affect mobility  - Identify mobility aids required to assist with transfers and/or ambulation (gait belt, sit-to-stand, lift, walker, cane, etc )  - [place] fall precautions as indicated by assessment  - Record patient progress and toleration of activity level on Mobility SBAR; progress patient to next Phase/Stage  - Instruct patient to call for assistance with activity based on assessment  - Consider rehabilitation consult to assist with strengthening/weightbearing, etc   Outcome: Progressing     Problem: DISCHARGE PLANNING  Goal: Discharge to home or other facility with appropriate resources  Description  INTERVENTIONS:  - Identify barriers to discharge w/patient and caregiver  - Arrange for needed discharge resources and transportation as appropriate  - Identify discharge learning needs (meds, wound care, etc )  - Arrange for interpretive services to assist at discharge as needed  - Refer to Case Management Department for coordinating discharge planning if the patient needs post-hospital services based on physician/advanced practitioner order or complex needs related to functional status, cognitive ability, or social support system  Outcome: Progressing     Problem: Knowledge Deficit  Goal: Patient/family/caregiver demonstrates understanding of disease process, treatment plan, medications, and discharge instructions  Description  Complete learning assessment and assess knowledge base  Interventions:  - Provide teaching at level of understanding  - Provide teaching via preferred learning methods  Outcome: Progressing     Problem: Nutrition/Hydration-ADULT  Goal: Nutrient/Hydration intake appropriate for improving, restoring or maintaining nutritional needs  Description  Monitor and assess patient's nutrition/hydration status for malnutrition  Collaborate with interdisciplinary team and initiate plan and interventions as ordered  Monitor patient's weight and dietary intake as ordered or per policy  Utilize nutrition screening tool and intervene as necessary  Determine patient's food preferences and provide high-protein, high-caloric foods as appropriate       INTERVENTIONS:  - Monitor oral intake, urinary output, labs, and treatment plans  - Assess nutrition and hydration status and recommend course of action  - Evaluate amount of meals eaten  - Assist patient with eating if necessary   - Allow adequate time for meals  - Recommend/ encourage appropriate diets, oral nutritional supplements, and vitamin/mineral supplements  - Order, calculate, and assess calorie counts as needed  - Recommend, monitor, and adjust tube feedings based on assessed needs  - Assess need for intravenous fluids  - Provide  nutrition/hydration education as appropriate  - Include patient/family/caregiver in decisions related to nutrition   Outcome: Progressing     Problem: GENITOURINARY - ADULT  Goal: Maintains or returns to baseline urinary function  Description  INTERVENTIONS:  - Assess urinary function  - Encourage oral fluids to ensure adequate hydration if ordered  - Administer IV fluids as ordered to ensure adequate hydration  - Administer ordered medications as needed  - Offer frequent toileting  - Follow urinary retention protocol if ordered  Outcome: Progressing  Goal: Urinary catheter remains patent  Description  INTERVENTIONS:  - Assess patency of urinary catheter  - If patient has a chronic lentz, consider changing catheter if non-functioning  - Follow guidelines for intermittent irrigation of non-functioning urinary catheter  Outcome: Progressing

## 2020-06-28 LAB
ANION GAP SERPL CALCULATED.3IONS-SCNC: 9 MMOL/L (ref 4–13)
BUN SERPL-MCNC: 22 MG/DL (ref 5–25)
CALCIUM SERPL-MCNC: 7.7 MG/DL (ref 8.3–10.1)
CHLORIDE SERPL-SCNC: 102 MMOL/L (ref 100–108)
CO2 SERPL-SCNC: 26 MMOL/L (ref 21–32)
CREAT SERPL-MCNC: 1.06 MG/DL (ref 0.6–1.3)
ERYTHROCYTE [DISTWIDTH] IN BLOOD BY AUTOMATED COUNT: 15.5 % (ref 11.6–15.1)
GFR SERPL CREATININE-BSD FRML MDRD: 47 ML/MIN/1.73SQ M
GLUCOSE SERPL-MCNC: 96 MG/DL (ref 65–140)
HCT VFR BLD AUTO: 24.4 % (ref 34.8–46.1)
HGB BLD-MCNC: 7.9 G/DL (ref 11.5–15.4)
INR PPP: 2.33 (ref 0.84–1.19)
MCH RBC QN AUTO: 30.5 PG (ref 26.8–34.3)
MCHC RBC AUTO-ENTMCNC: 32.4 G/DL (ref 31.4–37.4)
MCV RBC AUTO: 94 FL (ref 82–98)
NT-PROBNP SERPL-MCNC: 7330 PG/ML
PLATELET # BLD AUTO: 84 THOUSANDS/UL (ref 149–390)
PMV BLD AUTO: 10.6 FL (ref 8.9–12.7)
POTASSIUM SERPL-SCNC: 3.5 MMOL/L (ref 3.5–5.3)
PROTHROMBIN TIME: 25.8 SECONDS (ref 11.6–14.5)
RBC # BLD AUTO: 2.59 MILLION/UL (ref 3.81–5.12)
SODIUM SERPL-SCNC: 137 MMOL/L (ref 136–145)
WBC # BLD AUTO: 8.35 THOUSAND/UL (ref 4.31–10.16)

## 2020-06-28 PROCEDURE — 80048 BASIC METABOLIC PNL TOTAL CA: CPT | Performed by: FAMILY MEDICINE

## 2020-06-28 PROCEDURE — 85027 COMPLETE CBC AUTOMATED: CPT | Performed by: FAMILY MEDICINE

## 2020-06-28 PROCEDURE — 83880 ASSAY OF NATRIURETIC PEPTIDE: CPT | Performed by: FAMILY MEDICINE

## 2020-06-28 PROCEDURE — 99232 SBSQ HOSP IP/OBS MODERATE 35: CPT | Performed by: FAMILY MEDICINE

## 2020-06-28 PROCEDURE — 85610 PROTHROMBIN TIME: CPT | Performed by: FAMILY MEDICINE

## 2020-06-28 RX ORDER — FUROSEMIDE 10 MG/ML
20 INJECTION INTRAMUSCULAR; INTRAVENOUS
Status: COMPLETED | OUTPATIENT
Start: 2020-06-28 | End: 2020-06-29

## 2020-06-28 RX ADMIN — FERROUS SULFATE TAB 325 MG (65 MG ELEMENTAL FE) 325 MG: 325 (65 FE) TAB at 08:41

## 2020-06-28 RX ADMIN — PANTOPRAZOLE SODIUM 20 MG: 20 TABLET, DELAYED RELEASE ORAL at 06:45

## 2020-06-28 RX ADMIN — Medication 2 G: at 17:13

## 2020-06-28 RX ADMIN — CEFEPIME HYDROCHLORIDE 1000 MG: 1 INJECTION, POWDER, FOR SOLUTION INTRAMUSCULAR; INTRAVENOUS at 09:32

## 2020-06-28 RX ADMIN — FUROSEMIDE 20 MG: 10 INJECTION, SOLUTION INTRAMUSCULAR; INTRAVENOUS at 17:13

## 2020-06-28 RX ADMIN — ACETAMINOPHEN 650 MG: 325 TABLET, FILM COATED ORAL at 23:35

## 2020-06-28 RX ADMIN — Medication 2 G: at 08:42

## 2020-06-28 RX ADMIN — Medication 2 G: at 12:52

## 2020-06-28 RX ADMIN — CEFEPIME HYDROCHLORIDE 1000 MG: 1 INJECTION, POWDER, FOR SOLUTION INTRAMUSCULAR; INTRAVENOUS at 22:21

## 2020-06-28 RX ADMIN — WARFARIN SODIUM 5 MG: 5 TABLET ORAL at 17:13

## 2020-06-28 NOTE — ASSESSMENT & PLAN NOTE
CT abdomen showed: Moderate to severe right hydroureteronephrosis with  high attenuation seen within the collecting system, possibly related to hemorrhage or abnormal excretion of contrast from the right kidney related to previous CT study about 1 week ago  There is abrupt nonvisualization of the right ureter at the UVJ with associated soft tissue fullness and bladder wall thickening  Correlate with cystoscopy regarding possible bladder neoplasm  Additionally, there is obscuration of the soft tissue planes between   the vaginal cuff and posterior right bladder wall (best seen on series 2 image 102)  The possibility of potential neoplastic involvement of this region cannot be excluded  We are not going to pursue any further treatment at this time as far as this goes secondary to risk of general anesthesia  Radha Watson However patient does have a history of ovarian cancer as well as breast cancer  I did speak to the patient's primary oncologist as well as our Oncology team OB here  Family would like to know if this could be recurrence of either 1 of the cancers  Follow-up with the tumor markers  Family recommends Dr Jodie Arciniega see the patient on Monday and see what his recommendations are  If it is recurrence the patient may want to go home and not go to rehab  They are aware she is not a radiation or chemo candidate    Dr Jodie Arciniega the patient's original ovarian cancer treatment years ago at [facility]

## 2020-06-28 NOTE — PLAN OF CARE
Problem: Prexisting or High Potential for Compromised Skin Integrity  Goal: Skin integrity is maintained or improved  Description  INTERVENTIONS:  - Identify patients at risk for skin breakdown  - Assess and monitor skin integrity  - Assess and monitor nutrition and hydration status  - Monitor labs   - Assess for incontinence   - Turn and reposition patient  - Assist with mobility/ambulation  - Relieve pressure over bony prominences  - Avoid friction and shearing  - Provide appropriate hygiene as needed including keeping skin clean and dry  - Evaluate need for skin moisturizer/barrier cream  - Collaborate with interdisciplinary team   - Patient/family teaching  - Consider wound care consult   Outcome: Progressing     Problem: Potential for Falls  Goal: Patient will remain free of falls  Description  INTERVENTIONS:  - Assess patient frequently for physical needs  -  Identify cognitive and physical deficits and behaviors that affect risk of falls    -  [place] fall precautions as indicated by assessment   - Educate patient/family on patient safety including physical limitations  - Instruct patient to call for assistance with activity based on assessment  - Modify environment to reduce risk of injury  - Consider OT/PT consult to assist with strengthening/mobility  Outcome: Progressing     Problem: PAIN - ADULT  Goal: Verbalizes/displays adequate comfort level or baseline comfort level  Description  Interventions:  - Encourage patient to monitor pain and request assistance  - Assess pain using appropriate pain scale  - Administer analgesics based on type and severity of pain and evaluate response  - Implement non-pharmacological measures as appropriate and evaluate response  - Consider cultural and social influences on pain and pain management  - Notify physician/advanced practitioner if interventions unsuccessful or patient reports new pain  Outcome: Progressing     Problem: SAFETY ADULT  Goal: Maintain or return to baseline ADL function  Description  INTERVENTIONS:  -  Assess patient's ability to carry out ADLs; assess patient's baseline for ADL function and identify physical deficits which impact ability to perform ADLs (bathing, care of mouth/teeth, toileting, grooming, dressing, etc )  - Assess/evaluate cause of self-care deficits   - Assess range of motion  - Assess patient's mobility; develop plan if impaired  - Assess patient's need for assistive devices and provide as appropriate  - Encourage maximum independence but intervene and supervise when necessary  - Involve family in performance of ADLs  - Assess for home care needs following discharge   - Consider OT consult to assist with ADL evaluation and planning for discharge  - Provide patient education as appropriate  Outcome: Progressing  Goal: Maintain or return mobility status to optimal level  Description  INTERVENTIONS:  - Assess patient's baseline mobility status (ambulation, transfers, stairs, etc )    - Identify cognitive and physical deficits and behaviors that affect mobility  - Identify mobility aids required to assist with transfers and/or ambulation (gait belt, sit-to-stand, lift, walker, cane, etc )  - [place] fall precautions as indicated by assessment  - Record patient progress and toleration of activity level on Mobility SBAR; progress patient to next Phase/Stage  - Instruct patient to call for assistance with activity based on assessment  - Consider rehabilitation consult to assist with strengthening/weightbearing, etc   Outcome: Progressing     Problem: DISCHARGE PLANNING  Goal: Discharge to home or other facility with appropriate resources  Description  INTERVENTIONS:  - Identify barriers to discharge w/patient and caregiver  - Arrange for needed discharge resources and transportation as appropriate  - Identify discharge learning needs (meds, wound care, etc )  - Arrange for interpretive services to assist at discharge as needed  - Refer to Case Management Department for coordinating discharge planning if the patient needs post-hospital services based on physician/advanced practitioner order or complex needs related to functional status, cognitive ability, or social support system  Outcome: Progressing     Problem: Knowledge Deficit  Goal: Patient/family/caregiver demonstrates understanding of disease process, treatment plan, medications, and discharge instructions  Description  Complete learning assessment and assess knowledge base  Interventions:  - Provide teaching at level of understanding  - Provide teaching via preferred learning methods  Outcome: Progressing     Problem: Nutrition/Hydration-ADULT  Goal: Nutrient/Hydration intake appropriate for improving, restoring or maintaining nutritional needs  Description  Monitor and assess patient's nutrition/hydration status for malnutrition  Collaborate with interdisciplinary team and initiate plan and interventions as ordered  Monitor patient's weight and dietary intake as ordered or per policy  Utilize nutrition screening tool and intervene as necessary  Determine patient's food preferences and provide high-protein, high-caloric foods as appropriate       INTERVENTIONS:  - Monitor oral intake, urinary output, labs, and treatment plans  - Assess nutrition and hydration status and recommend course of action  - Evaluate amount of meals eaten  - Assist patient with eating if necessary   - Allow adequate time for meals  - Recommend/ encourage appropriate diets, oral nutritional supplements, and vitamin/mineral supplements  - Order, calculate, and assess calorie counts as needed  - Recommend, monitor, and adjust tube feedings based on assessed needs  - Assess need for intravenous fluids  - Provide  nutrition/hydration education as appropriate  - Include patient/family/caregiver in decisions related to nutrition   Outcome: Progressing     Problem: GENITOURINARY - ADULT  Goal: Maintains or returns to baseline urinary function  Description  INTERVENTIONS:  - Assess urinary function  - Encourage oral fluids to ensure adequate hydration if ordered  - Administer IV fluids as ordered to ensure adequate hydration  - Administer ordered medications as needed  - Offer frequent toileting  - Follow urinary retention protocol if ordered  Outcome: Progressing  Goal: Urinary catheter remains patent  Description  INTERVENTIONS:  - Assess patency of urinary catheter  - If patient has a chronic lentz, consider changing catheter if non-functioning  - Follow guidelines for intermittent irrigation of non-functioning urinary catheter  Outcome: Progressing

## 2020-06-28 NOTE — ASSESSMENT & PLAN NOTE
Patient has a history of a neurogenic bladder with an indwelling Smith catheter, was scheduled for urology follow-up outpatient 7/24  She has a few day history of cloudy urine, generalized weakness, and some episodes a hematuria last week  - UA showed innumerable bacteria  - creatinine 1 17, history of CKD 3  - CT of the abdomen showed hydroureternephrosis, with some bladder wall thickening and need for follow-up cystoscopy to rule out bladder cancer  Family wanted to forego evaluation as far as that goes secondary to needing general anesthesia  Can likely discontinue the antibiotics  It is likely all colonization     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

## 2020-06-28 NOTE — ASSESSMENT & PLAN NOTE
I have restarted the patient's Coumadin today  Patient has a history of factor 5 Leiden  INR is now therapeutic    Will put the patient on her 4 mg daily as she normally takes

## 2020-06-28 NOTE — ASSESSMENT & PLAN NOTE
Patient has been requiring oxygen here  They did bump her up to 5 L  Patient is still on 5 L right now  I think this could be volume overload  I did get a CT scan of the chest yesterday which showed some opacities  I rechecked another COVID and that came back negative  Possible pneumonitis versus aspiration  Will give 2 doses of Lasix at a low dose and monitor    May need to consider pulmonology evaluation

## 2020-06-28 NOTE — PROGRESS NOTES
Progress Note - Jesus Daley 9/20/1930, 80 y o  female MRN: [id number]    Unit/Bed#: -01 Encounter: [id number]    Primary Care Provider: Emiliana Amador DO   Date and time admitted to hospital: 6/22/2020 10:22 AM        Acute respiratory failure with hypoxemia [facility])  Assessment & Plan  Patient has been requiring oxygen here  They did bump her up to 5 L  Patient is still on 5 L right now  I think this could be volume overload  I did get a CT scan of the chest yesterday which showed some opacities  I rechecked another COVID and that came back negative  Possible pneumonitis versus aspiration  Will give 2 doses of Lasix at a low dose and monitor  May need to consider pulmonology evaluation    Hydroureteronephrosis  Assessment & Plan  CT abdomen showed: Moderate to severe right hydroureteronephrosis with  high attenuation seen within the collecting system, possibly related to hemorrhage or abnormal excretion of contrast from the right kidney related to previous CT study about 1 week ago  There is abrupt nonvisualization of the right ureter at the UVJ with associated soft tissue fullness and bladder wall thickening  Correlate with cystoscopy regarding possible bladder neoplasm  Additionally, there is obscuration of the soft tissue planes between   the vaginal cuff and posterior right bladder wall (best seen on series 2 image 102)  The possibility of potential neoplastic involvement of this region cannot be excluded  We are not going to pursue any further treatment at this time as far as this goes secondary to risk of general anesthesia  Damian Abernathy However patient does have a history of ovarian cancer as well as breast cancer  I did speak to the patient's primary oncologist as well as our Oncology team OB here  Family would like to know if this could be recurrence of either 1 of the cancers  Follow-up with the tumor markers    Family recommends Dr Rere Guevara see the patient on Monday and see what his recommendations are  If it is recurrence the patient may want to go home and not go to rehab  They are aware she is not a radiation or chemo candidate  Dr Angel Cheema the patient's original ovarian cancer treatment years ago at [address] [facility]  ,[address] Unit  - history of anemia, takes oral iron daily, hemoglobin 8 2  Hemoglobin is stable at around 8    History of DVT (deep vein thrombosis)  Assessment & Plan  I have restarted the patient's Coumadin today  Patient has a history of factor 5 Leiden  INR is now therapeutic  Will put the patient on her 4 mg daily as she normally takes    Generalized weakness  Assessment & Plan  - patient has had a few days of generalized weakness, no her falls, will have PT OT evaluate, patient lives with son and is having trouble getting around the house and oldest son who was at bedside would like patient to be considered for short-term rehab  Needs rehab but family is deciding what to do based on potential prognosis if this is worsening cancer  I do feel this could be recurrence of cancer  I did check the tumor markers and had Oncology evaluate the patient  Patient's  currently is 175 6, the CA 15-3 is 185, and this CA 27-29 is 363 2  When I call the patient's primary oncologist   The patient did have some tumor markers done about a month ago and at that time her CA 15-3 was I believe 184 and the  was 115 4 So the markers are going up slowly  Do not know how relevant this increases  Family would like to speak to [facility] to see if this could be recurrence of her ovarian cancer  She has both a history of ovarian cancer and we breast cancer      Benign hypertension with chronic kidney disease, stage III (HCC)  Assessment & Plan  Creatinine 1 17 which appears at patient's baseline, blood pressure elevated on arrival will continue to monitor, does not appear like patient is on medication for hypertension, son does not have medication list with him, will continue to monitor blood pressure  Creatinine is stable  Just continue to monitor especially since the patient did get a CT scan with contrast yesterday and I am going to give her some Lasix today secondary to mild volume overload    * Urinary tract infection associated with indwelling urethral catheter [facility])  Assessment & Plan  Patient has a history of a neurogenic bladder with an indwelling Smith catheter, was scheduled for urology follow-up outpatient 7/24  She has a few day history of cloudy urine, generalized weakness, and some episodes a hematuria last week  - UA showed innumerable bacteria  - creatinine 1 17, history of CKD 3  - CT of the abdomen showed hydroureternephrosis, with some bladder wall thickening and need for follow-up cystoscopy to rule out bladder cancer  Family wanted to forego evaluation as far as that goes secondary to needing general anesthesia  Can likely discontinue the antibiotics  It is likely all colonization     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  As stated likely colonization  Can likely discontinue antibiotics tomorrow  will just give 3 days of cefepime        VTE Pharmacologic Prophylaxis:   Pharmacologic: Warfarin (Coumadin)  Mechanical VTE Prophylaxis in Place: Yes    Patient Centered Rounds: I have performed bedside rounds with nursing staff today  Education and Discussions with Family / Patient: Will discuss with family    Time Spent for Care: 20 minutes  More than 50% of total time spent on counseling and coordination of care as described above  Current Length of Stay: 6 day(s)    Current Patient Status: Inpatient   Certification Statement: The patient will continue to require additional inpatient hospital stay due to Still being hypoxic in need of weaning of oxygen    Discharge Plan:  Hopeful discharge in the next 1-2 days    Code Status: Level 1 - Full Code      Subjective:   Patient seen examined  She states she is feeling okay    Does look slightly tachypneic    Objective:     Vitals:   Temp (24hrs), Av 3 °F (36 8 °C), Min:97 9 °F (36 6 °C), Max:98 9 °F (37 2 °C)    Temp:  [97 9 °F (36 6 °C)-98 9 °F (37 2 °C)] 97 9 °F (36 6 °C)  HR:  [85-93] 93  Resp:  [17-18] 18  BP: (142-162)/(63-70) 142/63  SpO2:  [90 %-93 %] 93 %  Body mass index is 26 26 kg/m²  Input and Output Summary (last 24 hours): Intake/Output Summary (Last 24 hours) at 2020 0813  Last data filed at 2020 0601  Gross per 24 hour   Intake 809 ml   Output 800 ml   Net 9 ml       Physical Exam:     Physical Exam  (   General Appearance:    Alert, cooperative, no distress, appears stated age                               Lungs:     Decreased breath sounds       Heart:    Regular rate and rhythm, S1 and S2 normal, no murmur, rub    or gallop   Abdomen:     Soft, non-tender, bowel sounds active all four quadrants,     no masses, no organomegaly           Extremities:   Extremities normal, atraumatic, no cyanosis or edema       Additional Data:     Labs:    Results from last 7 days   Lab Units 20  0504  20  1053   WBC Thousand/uL 8 35   < > 8 28   HEMOGLOBIN g/dL 7 9*   < > 8 2*   HEMATOCRIT % 24 4*   < > 25 1*   PLATELETS Thousands/uL 84*   < > 123*   NEUTROS PCT %  --   --  75   LYMPHS PCT %  --   --  12*   MONOS PCT %  --   --  8   EOS PCT %  --   --  3    < > = values in this interval not displayed  Results from last 7 days   Lab Units 20  0504  20  0637   SODIUM mmol/L 137   < > 132*   POTASSIUM mmol/L 3 5   < > 3 9   CHLORIDE mmol/L 102   < > 102   CO2 mmol/L 26   < > 21   BUN mg/dL 22   < > 18   CREATININE mg/dL 1 06   < > 0 96   ANION GAP mmol/L 9   < > 9   CALCIUM mg/dL 7 7*   < > 7 5*   ALBUMIN g/dL  --   --  2 0*   TOTAL BILIRUBIN mg/dL  --   --  0 50   ALK PHOS U/L  --   --  147*   ALT U/L  --   --  20   AST U/L  --   --  25   GLUCOSE RANDOM mg/dL 96   < > 85    < > = values in this interval not displayed       Results from last 7 days Lab Units 06/28/20  0504   INR  2 33*     Results from last 7 days   Lab Units 06/27/20  2123   POC GLUCOSE mg/dl 164*         Results from last 7 days   Lab Units 06/22/20  1054 06/22/20  1053   LACTIC ACID mmol/L  --  0 8   PROCALCITONIN ng/ml 0 14  --            * I Have Reviewed All Lab Data Listed Above  * Additional Pertinent Lab Tests Reviewed: Anne 66 Admission Reviewed      Recent Cultures (last 7 days):     Results from last 7 days   Lab Units 06/22/20  1741 06/22/20  1054   BLOOD CULTURE   --  No Growth After 5 Days  No Growth After 5 Days  URINE CULTURE  >100,000 cfu/ml Enterobacter cloacae-[facility] *  10,000-19,000 cfu/ml Morganella morganii*  --        Last 24 Hours Medication List:     Current Facility-Administered Medications:  acetaminophen 650 mg Oral Q6H PRN Isidra Christa, DO    albuterol 2 puff Inhalation Q4H PRN Isidra Goodwin, DO    cefepime 1,000 mg Intravenous Q12H Elisa Fitzgerald MD Last Rate: Stopped (06/28/20 0228)   ferrous sulfate 325 mg Oral Daily With Breakfast Celiaairam Piña, DO    fluticasone 1 spray Nasal Daily PRN Isidra Goodwin, DO    furosemide 20 mg Intravenous BID (diuretic) Elisa Fitzgerald MD    pantoprazole 20 mg Oral Early Morning Celia Ayana, DO    polyethylene glycol 17 g Oral Daily PRN Isidra Goodwin, DO    sodium chloride 1 spray Each Nare Q1H PRN Elisa Fitzgerald MD    sodium chloride 2 g Oral TID With Meals Isidra Goodwin, DO    warfarin 5 mg Oral Daily (warfarin) Elisa Fitzgerald MD         Today, Patient Was Seen By: Elisa Fitzgerald MD    ** Please Note: Dictation voice to text software may have been used in the creation of this document   **

## 2020-06-28 NOTE — ASSESSMENT & PLAN NOTE
Creatinine 1 17 which appears at patient's baseline, blood pressure elevated on arrival will continue to monitor, does not appear like patient is on medication for hypertension, son does not have medication list with him, will continue to monitor blood pressure  Creatinine is stable    Just continue to monitor especially since the patient did get a CT scan with contrast yesterday and I am going to give her some Lasix today secondary to mild volume overload

## 2020-06-28 NOTE — QUICK NOTE
I had a long conversation with the daughter-in-law  She wants some direction as to where to go  Her mother has been declining steadily over the past few months  I told her that may need to consider hospice but she would like to now from the cancer standpoint if things are just worsening so therefore it would make their decision easier to say yes to considering hospice and may be leaning towards home hospice  They would like to see what Dr Hernández says  I would call him on Monday to see if he is able to see the patient or at least look at the notes  And I also told the daughter-in-law that we can touch base with our oncology group and let them look at the tumor markers that they ordered and maybe that will make her decision a little easier  I did tell her that since she has been declining over the past few months that that would be an appropriate we did go but we will see what the above consultants say  If they do agree to home hospice they would like to talk to case management about if thinking get additional help at home  Even during the hospitalization I told during lie do not see a improvement and maybe even a gradual decline  The patient is now requiring some oxygen possibly be aspirating and but just getting weaker  Even the CT scan that we did yesterday shows that there was some worsening of the nodes from a CT scan done just last week

## 2020-06-29 ENCOUNTER — APPOINTMENT (INPATIENT)
Dept: NON INVASIVE DIAGNOSTICS | Facility: HOSPITAL | Age: 85
DRG: 698 | End: 2020-06-29
Payer: MEDICARE

## 2020-06-29 ENCOUNTER — APPOINTMENT (INPATIENT)
Dept: RADIOLOGY | Facility: HOSPITAL | Age: 85
DRG: 698 | End: 2020-06-29
Payer: MEDICARE

## 2020-06-29 LAB
ANION GAP SERPL CALCULATED.3IONS-SCNC: 7 MMOL/L (ref 4–13)
BUN SERPL-MCNC: 29 MG/DL (ref 5–25)
CALCIUM SERPL-MCNC: 7.8 MG/DL (ref 8.3–10.1)
CHLORIDE SERPL-SCNC: 100 MMOL/L (ref 100–108)
CO2 SERPL-SCNC: 26 MMOL/L (ref 21–32)
CREAT SERPL-MCNC: 1.41 MG/DL (ref 0.6–1.3)
ERYTHROCYTE [DISTWIDTH] IN BLOOD BY AUTOMATED COUNT: 15.6 % (ref 11.6–15.1)
GFR SERPL CREATININE-BSD FRML MDRD: 33 ML/MIN/1.73SQ M
GLUCOSE SERPL-MCNC: 109 MG/DL (ref 65–140)
HCT VFR BLD AUTO: 22.5 % (ref 34.8–46.1)
HGB BLD-MCNC: 7.3 G/DL (ref 11.5–15.4)
MCH RBC QN AUTO: 30.4 PG (ref 26.8–34.3)
MCHC RBC AUTO-ENTMCNC: 32.4 G/DL (ref 31.4–37.4)
MCV RBC AUTO: 94 FL (ref 82–98)
PLATELET # BLD AUTO: 76 THOUSANDS/UL (ref 149–390)
PMV BLD AUTO: 10.5 FL (ref 8.9–12.7)
POTASSIUM SERPL-SCNC: 3.4 MMOL/L (ref 3.5–5.3)
RBC # BLD AUTO: 2.4 MILLION/UL (ref 3.81–5.12)
SODIUM SERPL-SCNC: 133 MMOL/L (ref 136–145)
WBC # BLD AUTO: 9.13 THOUSAND/UL (ref 4.31–10.16)

## 2020-06-29 PROCEDURE — 71045 X-RAY EXAM CHEST 1 VIEW: CPT

## 2020-06-29 PROCEDURE — 93306 TTE W/DOPPLER COMPLETE: CPT | Performed by: INTERNAL MEDICINE

## 2020-06-29 PROCEDURE — 93306 TTE W/DOPPLER COMPLETE: CPT

## 2020-06-29 PROCEDURE — 85027 COMPLETE CBC AUTOMATED: CPT | Performed by: FAMILY MEDICINE

## 2020-06-29 PROCEDURE — 99447 NTRPROF PH1/NTRNET/EHR 11-20: CPT | Performed by: STUDENT IN AN ORGANIZED HEALTH CARE EDUCATION/TRAINING PROGRAM

## 2020-06-29 PROCEDURE — 80048 BASIC METABOLIC PNL TOTAL CA: CPT | Performed by: FAMILY MEDICINE

## 2020-06-29 PROCEDURE — 99233 SBSQ HOSP IP/OBS HIGH 50: CPT | Performed by: STUDENT IN AN ORGANIZED HEALTH CARE EDUCATION/TRAINING PROGRAM

## 2020-06-29 PROCEDURE — 99233 SBSQ HOSP IP/OBS HIGH 50: CPT | Performed by: PHYSICIAN ASSISTANT

## 2020-06-29 RX ORDER — METHYLPREDNISOLONE SODIUM SUCCINATE 40 MG/ML
40 INJECTION, POWDER, LYOPHILIZED, FOR SOLUTION INTRAMUSCULAR; INTRAVENOUS EVERY 12 HOURS SCHEDULED
Status: DISCONTINUED | OUTPATIENT
Start: 2020-06-29 | End: 2020-07-01

## 2020-06-29 RX ADMIN — FUROSEMIDE 20 MG: 10 INJECTION, SOLUTION INTRAMUSCULAR; INTRAVENOUS at 07:32

## 2020-06-29 RX ADMIN — Medication 2 G: at 07:33

## 2020-06-29 RX ADMIN — FERROUS SULFATE TAB 325 MG (65 MG ELEMENTAL FE) 325 MG: 325 (65 FE) TAB at 07:33

## 2020-06-29 RX ADMIN — Medication 2 G: at 17:19

## 2020-06-29 RX ADMIN — METHYLPREDNISOLONE SODIUM SUCCINATE 40 MG: 40 INJECTION, POWDER, FOR SOLUTION INTRAMUSCULAR; INTRAVENOUS at 21:29

## 2020-06-29 RX ADMIN — WARFARIN SODIUM 5 MG: 5 TABLET ORAL at 17:19

## 2020-06-29 RX ADMIN — CEFEPIME HYDROCHLORIDE 1000 MG: 1 INJECTION, POWDER, FOR SOLUTION INTRAMUSCULAR; INTRAVENOUS at 09:30

## 2020-06-29 RX ADMIN — Medication 2 G: at 11:34

## 2020-06-29 RX ADMIN — PANTOPRAZOLE SODIUM 20 MG: 20 TABLET, DELAYED RELEASE ORAL at 06:27

## 2020-06-29 NOTE — ASSESSMENT & PLAN NOTE
Patient has a history of a neurogenic bladder with an indwelling Smith catheter, was scheduled for urology follow-up outpatient 7/24  She has a few day history of cloudy urine, generalized weakness, and some episodes a hematuria last week  - UA showed innumerable bacteria  - creatinine 1 17, history of CKD 3  - CT of the abdomen showed hydroureternephrosis, with some bladder wall thickening and need for follow-up cystoscopy to rule out bladder cancer  Family wanted to forego evaluation as far as that goes secondary to needing general anesthesia  Can likely discontinue the antibiotics  It is likely all colonization  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

## 2020-06-29 NOTE — ASSESSMENT & PLAN NOTE
No signs of active overt bleeding noted  Hemodynamically stable  Currently hemoglobin noted 7 3  Monitor CBC and transfuse below 7

## 2020-06-29 NOTE — PROGRESS NOTES
Progress Note - [facility]/On[facility]   Leslie Castellano 80 y o  female MRN: [id number]  Unit/Bed#: -01 Encounter: [id number]    Attending Hospital Physician: Jerri Ambrosio MD  Date of Initial Hematology/Oncology Hospital Consultation:  6/26/2020  Reason for Consultation:  Pelvic mass, bladder wall thickening    Please see initial hospital consult note dated 6/26/2020 for additional admission details  HPI: Leslie Castellano is a 80y o  year old female with a history of ovarian and breast malignancy who presented 6/22/2020 with generalized weakness  Patient has a chronic indwelling Smith due to neurogenic bladder and presented with generalized weakness for several days,  subsequent workup showed left hydronephrosis from a bladder wall thickening and enlarged left pelvic sidewall lymphadenopathy  She was treated for urine infection  Multiple conversations had taken place regarding the workup of the left pelvic sidewall lymphadenopathy in bladder wall thickening with cystoscopy and tissue biopsy  Patient has significant past medical history of breast and ovarian carcinoma  Ovarian carcinoma was diagnosed and treated in 2005 with surgery and chemotherapy without further recurrence of disease  She was treated at [facility]  Patient has a significant history of breast carcinoma as well, diagnosed initially in 2015, she underwent lumpectomy and not participate in any adjuvant treatment  In 2019 she had recurrence of the breast cancer and subsequently underwent  lumpectomy and radiotherapy completing in September of 2019  Given the patient's history of ovarian and breast malignancy, tumor markers were obtained this admission at the family's request to try to determine an etiology without invasive workup with biopsy of the pelvic finding  However a more definitive diagnosis has not been able to be decided given limited information with tumor markers      Primary hospital team, department of Urology as well as Oncology have had multiple conversations with the patient and patient's daughter-in-law regarding the above without a definitive treatment plan whether to pursue biopsy verses hospice or palliative care or acute rehab for discharge planning  Assessment/Plan:   #1 Left pelvic sidewall lymphadenopathy  #2 Bladder wall thickening  The above findings on admission are concerning for possible malignancy, however cystoscopy/TURBT with needed for definitive diagnosis at this time  During this admission, the patient and her family have been undecided on how to proceed regarding workup verses transition to palliative/comfort care  We did obtain multiple tumor markers including CA 15-3,  and CA 27-29 in an effort to try to determine the most likely etiology without any invasive workup to obtain a biopsy  However unfortunately we are unable to determine based on tumor markers and would recommend a biopsy in the outpatient setting with the Department of Urology as previously discussed with the urology service  I discussed the above with the patient who remains interested in knowing the diagnosis and is willing to go through obtaining a biopsy and understands the possibility of chemotherapy may be the recommended treatment of course depending on the biopsy results and final pathology  This time, she is quite weak and frail and not a good candidate for chemotherapy, however if she would improve functionally we would reassess determine appropriateness of any chemotherapy or even other systemic therapy such as biologics or immunotherapy would be appropriate again depending on the pathology      #3 History of breast cancer, diagnosed 2015 with recurrence in 2019, s/p lumpectomy +RT  #4 History of ovarian cancer, diagnosed 2005 s/p surgery plus chemo  Although the possibility of breast or ovarian carcinoma is possible related to #1 or #2 seems less likely given the location of the lymphadenopathy and bladder wall involvement  However, a biopsy is needed to definitively diagnose the etiology    Discussed the above via Tiger text with Dr Indra Valero in Gyn-Oncology as well as BRODERICK Velásquez on the urology service  Addendum:  Same-day-  Discussed obtaining a left pelvic curt biopsy with IR who reviewed imaging and is amenable to biopsy following  5 days after d/c of warfarin with INR <1 5 (similar to if Urology were to perform biopsies)  Primary team planning to further discuss the above with the patient and her family  The primary team has also consulted Gyn/Oncologist, Dr Indra Valero for his opinion regarding the same  Recommendations discussed with the primary team ([facility]-Dr Galdamez) on 6/29/2020    Please contact us if you have any questions  Subjective: Today, the patient reports some difficulty with cough and productive mucus  She is currently utilizing supplemental O2, denies any chest pain palpitations, nausea vomiting  She has a poor appetite and still feels relatively weak since admission  Currently denies any abdominal or flank pain  Review of Systems   Constitutional: Positive for appetite change and fatigue  Negative for chills and fever  HENT:   Negative for nosebleeds, sore throat and trouble swallowing  Eyes: Negative for icterus  Respiratory: Positive for cough and shortness of breath  Negative for hemoptysis and wheezing  Cardiovascular: Negative for chest pain, leg swelling and palpitations  Gastrointestinal: Negative for abdominal pain, nausea and vomiting  Genitourinary: Negative for hematuria  Musculoskeletal: Negative for flank pain and myalgias  Skin: Negative for rash  Neurological: Negative for dizziness, headaches, light-headedness and speech difficulty  Psychiatric/Behavioral: Negative for confusion  All other systems reviewed and are negative        The remainder of a 12 point review of systems was negative  Objective:  /55   Pulse 103   Temp 98 1 °F (36 7 °C)   Resp 16   Ht 5' (1 524 m)   Wt 61 kg (134 lb 7 7 oz)   SpO2 90%   BMI 26 26 kg/m²     Physical Exam   Constitutional: She is oriented to person, place, and time  No distress  Eyes: Conjunctivae are normal  No scleral icterus  Neck: Normal range of motion  Cardiovascular: Normal rate  Pulmonary/Chest: Effort normal  No respiratory distress  She exhibits no tenderness  Abdominal: There is no tenderness  Musculoskeletal: She exhibits no tenderness  Neurological: She is alert and oriented to person, place, and time  Skin: Skin is warm and dry  No rash noted  No erythema  No pallor  Vitals reviewed  Recent Labs     06/27/20  0550 06/28/20  0504 06/29/20  0546   WBC 9 94 8 35 9 13   HGB 8 0* 7 9* 7 3*   PLT 89* 84* 76*   MCV 93 94 94   RDW 15 2* 15 5* 15 6*   CREATININE 1 10 1 06 1 41*       Laboratory studies were reviewed      Code Status: Level 1 - Full Code    Counseling / Coordination of Care  Total floor / unit time spent today 30 minutes  Greater than 50% of total time was spent with the patient and / or family counseling and / or coordination of care

## 2020-06-29 NOTE — ASSESSMENT & PLAN NOTE
Patient received CT with contrast earlier as well as Lasix  Now noted with elevated BUN and creatinine  29/1 41  Monitor off diuretics  Monitor on renal function with a p o  Intake  Will consider nephrology consult if not improving or worsening

## 2020-06-29 NOTE — PROGRESS NOTES
Progress Note - Jesus Motley 9/20/1930, 80 y o  female MRN: [id number]    Unit/Bed#: -01 Encounter: [id number]    Primary Care Provider: Konstantin Medeiros DO   Date and time admitted to hospital: 6/22/2020 10:22 AM        * Urinary tract infection associated with indwelling urethral catheter [facility])  Assessment & Plan  Patient has a history of a neurogenic bladder with an indwelling Smith catheter, was scheduled for urology follow-up outpatient 7/24  She has a few day history of cloudy urine, generalized weakness, and some episodes a hematuria last week  - UA showed innumerable bacteria  - creatinine 1 17, history of CKD 3  - CT of the abdomen showed hydroureternephrosis, with some bladder wall thickening and need for follow-up cystoscopy to rule out bladder cancer  Family wanted to forego evaluation as far as that goes secondary to needing general anesthesia  Can likely discontinue the antibiotics  It is likely all colonization  Overall poor prognosis  Follow-up with palliative care consult tomorrow  Acute respiratory failure with hypoxemia [facility])  Assessment & Plan  Patient has been requiring oxygen here  They did bump her up to 5 L  Patient is still on 5 L right now  I think this could be volume overload  I did get a CT scan of the chest yesterday which showed some opacities  COVID-19 test negative x2    Possible pneumonitis versus aspiration  Today clinically patient appears comfortable not in distress  O2 saturation improved to 97% on non-rebreather  Repeat chest x-ray shows course lung findings noted worsening from previous study: hypoinflation vs congestion  Echo report noted with EF of 60% with grade 1 diastolic dysfunction  Clinically does not appear to be overtly fluid overloaded  Procalcitonin within normal limits  Hold Lasix now in the settings of elevated BUN creatinine    Will give trial of IV Solu-Medrol  Continue to monitor renal function, consider pulmonology consult  Hydroureteronephrosis  Assessment & Plan  Noted with elevated tumor markers including CA 15-3, , CA 27-29  Patient was seen by Urology but seems like patient does not want any aggressive intervention  I had discussion with patient and daughter-in-law at length regarding possibility of IR guided lymph node biopsy and if it inconclusive then  she may need a tissue biopsy which is more invasive  Patient does not want any aggressive surgical intervention  Based on Hematology Oncology recommendation patient is not a good candidate for chemotherapy due to poor functional status at this time  With lengthy discussion about above it seems like that plan will most likely be going home on comfort care but daughter-in-law at bedside needs to discuss among other family members  Goal is to follow-up with palliative care tomorrow and likely a care team meeting before making final decision per family request   Overall poor prognosis  Patient agreeable with above  Anemia  Assessment & Plan  No signs of active overt bleeding noted  Hemodynamically stable  Currently hemoglobin noted 7 3  Monitor CBC and transfuse below 7  History of DVT (deep vein thrombosis)  Assessment & Plan  I have restarted the patient's Coumadin today  Patient has a history of factor 5 Leiden  INR is now therapeutic  Will put the patient on her 4 mg daily as she normally takes    Generalized weakness  Assessment & Plan  - patient has had a few days of generalized weakness, no her falls, will have PT OT evaluate, patient lives with son and is having trouble getting around the house and oldest son who was at bedside would like patient to be considered for short-term rehab  Needs rehab but family is deciding what to do based on potential prognosis if this is worsening cancer  Benign hypertension with chronic kidney disease, stage III [facility])  Assessment & Plan  Patient received CT with contrast earlier as well as Lasix    Now noted with elevated BUN and creatinine   41  Monitor off diuretics  Monitor on renal function with a p o  Intake  Will consider nephrology consult if not improving or worsening  VTE Pharmacologic Prophylaxis:   Pharmacologic: Warfarin (Coumadin)    Patient Centered Rounds: I have performed bedside rounds with nursing staff today  Education and Discussions with Family / Patient:  Discussed with daughter-in-law at bedside  Time Spent for Care: 30 minutes  More than 50% of total time spent on counseling and coordination of care as described above  Current Length of Stay: 7 day(s)    Current Patient Status: Inpatient   Certification Statement: The patient will continue to require additional inpatient hospital stay due to Above    Discharge Plan: tbd    Code Status: Level 1 - Full Code      Subjective:   Currently patient is afebrile, hemodynamically stable  Seen during getting echo  Patient appears comfortable not in distress  Not in acute respiratory distress  O2 saturation 97% on non rebreather at later time  Patient is a poor historian due to dementia  No other events reported  Objective:     Vitals:   Temp (24hrs), Av 9 °F (37 2 °C), Min:98 1 °F (36 7 °C), Max:99 8 °F (37 7 °C)    Temp:  [98 1 °F (36 7 °C)-99 8 °F (37 7 °C)] 98 9 °F (37 2 °C)  HR:  [94] 94  Resp:  [16-18] 16  BP: (113-146)/(55-66) 146/66  SpO2:  [87 %-90 %] 87 %  Body mass index is 26 26 kg/m²  Input and Output Summary (last 24 hours): Intake/Output Summary (Last 24 hours) at 2020 1816  Last data filed at 2020 1500  Gross per 24 hour   Intake 1570 ml   Output 1750 ml   Net -180 ml       Physical Exam:     Physical Exam   Constitutional: No distress  Chronically ill-appearing, not in acute distress  HENT:   Head: Normocephalic and atraumatic  Eyes: Pupils are equal, round, and reactive to light  EOM are normal    Neck: Normal range of motion  Neck supple  No JVD present     Cardiovascular: Normal rate and regular rhythm  Pulmonary/Chest: Effort normal and breath sounds normal  No stridor  No respiratory distress  Abdominal: Soft  Bowel sounds are normal  She exhibits no distension  There is no tenderness  Musculoskeletal: Normal range of motion  Neurological:   Patient is awake, alert, oriented to person, not oriented to place or time  Currently cooperative during exam   Poor historian  Skin: She is not diaphoretic  Psychiatric: Her behavior is normal        Additional Data:     Labs:    Results from last 7 days   Lab Units 06/29/20  0546   WBC Thousand/uL 9 13   HEMOGLOBIN g/dL 7 3*   HEMATOCRIT % 22 5*   PLATELETS Thousands/uL 76*     Results from last 7 days   Lab Units 06/29/20  0546  06/23/20  0637   SODIUM mmol/L 133*   < > 132*   POTASSIUM mmol/L 3 4*   < > 3 9   CHLORIDE mmol/L 100   < > 102   CO2 mmol/L 26   < > 21   BUN mg/dL 29*   < > 18   CREATININE mg/dL 1 41*   < > 0 96   ANION GAP mmol/L 7   < > 9   CALCIUM mg/dL 7 8*   < > 7 5*   ALBUMIN g/dL  --   --  2 0*   TOTAL BILIRUBIN mg/dL  --   --  0 50   ALK PHOS U/L  --   --  147*   ALT U/L  --   --  20   AST U/L  --   --  25   GLUCOSE RANDOM mg/dL 109   < > 85    < > = values in this interval not displayed  Results from last 7 days   Lab Units 06/28/20  0504   INR  2 33*     Results from last 7 days   Lab Units 06/27/20  2123   POC GLUCOSE mg/dl 164*                   * I Have Reviewed All Lab Data Listed Above  * Additional Pertinent Lab Tests Reviewed: All Labs Within Last 24 Hours Reviewed    Imaging:    Imaging Reports Reviewed Today Include: Today's chest x-ray report noted        Recent Cultures (last 7 days):           Last 24 Hours Medication List:     Current Facility-Administered Medications:  acetaminophen 650 mg Oral Q6H PRN Sofia Moons, DO    albuterol 2 puff Inhalation Q4H PRN Sofia Jose Luis, DO    cefepime 1,000 mg Intravenous Q12H Maeve Clinton MD Last Rate: 1,000 mg (06/29/20 0930)   ferrous sulfate 325 mg Oral Daily With Breakfast Celiaairam Piña, DO    fluticasone 1 spray Nasal Daily PRN Roman Herrera, DO    methylPREDNISolone sodium succinate 40 mg Intravenous Q12H [address] Masoud BLAIR MD    pantoprazole 20 mg Oral Early Morning Celia Ayana, DO    polyethylene glycol 17 g Oral Daily PRN Roman Herrera, DO    sodium chloride 1 spray Each Nare Q1H PRN Shravan Bonilla MD    sodium chloride 2 g Oral TID With Meals Roman Herrera, DO    warfarin 5 mg Oral Daily (warfarin) Shravan Bonilla MD         Today, Patient Was Seen By: Lei Fothergill, MD    ** Please Note: Dictation voice to text software may have been used in the creation of this document   **

## 2020-06-29 NOTE — NURSING NOTE
1600 pt desat to low 80's  Pt already on 6L O2, tried to reposition and encourage deep breathing, pt pulse ox 86%, pt also had some B/L LE swelling  Dr Javier Hills made aware, respiratory called  Placed patient on non-rebreather, pulse ox up to 93%  Chest Xray ordered   Will continue to monitor

## 2020-06-29 NOTE — CONSULTS
INTERPROFESSIONAL (PHONE) CONSULTATION - Interventional Radiology  Jose Edwards 80 y o  female MRN: [id number]  Unit/Bed#: -01 Encounter: [id number]    IR has been consulted to evaluate the patient, determine the appropriate procedure, and whether or not a procedure can and should be performed regarding the care of Jose Edwards  We were consulted by Venus Schmitz concerning Jose Edwards, and to possibly perform a biopsy if medically appropriate for the patient  Consults  06/29/20      Assessment/Recommendation:   The left pelvic sidewall lymph node is amenable to percutaneous biopsy if the patient can lay on her stomach  Her warfarin will need to be held for 5 days with a documented INR < 1 5  Plan was discussed with Venus Schmitz  Total time spent in review of data, discussion with requesting provider and rendering advice was 15 minutes  Patient or appropriate family member was verbally informed by Venus Schmitz of this consultative service on their behalf to provide more timely access to specialty care in lieu of an in person consultation  They were informed it is a billable service unless the it was determined an in person follow up was medically necessary by me within the next 14 days at which time only the in person consult would be billed  Verbal consent was obtained  Thank you for allowing Interventional Radiology to participate in the care of Jose Edwards  Please don't hesitate to call or TigerText us with any questions       Kenia Carroll MD

## 2020-06-29 NOTE — ASSESSMENT & PLAN NOTE
Patient does not want any aggressive surgical intervention  I had discussion with patient's daughter-in-law at length today and plan is to follow-up with palliative care consult tomorrow for goals of care discussion

## 2020-06-29 NOTE — ASSESSMENT & PLAN NOTE
Patient has been requiring oxygen here  They did bump her up to 5 L  Patient is still on 5 L right now  I think this could be volume overload  I did get a CT scan of the chest yesterday which showed some opacities  COVID-19 test negative x2    Possible pneumonitis versus aspiration  Today clinically patient appears comfortable not in distress  O2 saturation improved to 97% on non-rebreather  Repeat chest x-ray shows course lung findings noted worsening from previous study: hypoinflation vs congestion  Echo report noted with EF of 60% with grade 1 diastolic dysfunction  Clinically does not appear to be overtly fluid overloaded  Procalcitonin within normal limits  Hold Lasix now in the settings of elevated BUN creatinine  Will give trial of IV Solu-Medrol  Continue to monitor renal function, consider pulmonology consult

## 2020-06-30 LAB
ABO GROUP BLD: NORMAL
ALBUMIN SERPL BCP-MCNC: 2 G/DL (ref 3.5–5)
ALP SERPL-CCNC: 185 U/L (ref 46–116)
ALT SERPL W P-5'-P-CCNC: 24 U/L (ref 12–78)
ANION GAP SERPL CALCULATED.3IONS-SCNC: 7 MMOL/L (ref 4–13)
AST SERPL W P-5'-P-CCNC: 27 U/L (ref 5–45)
BILIRUB SERPL-MCNC: 0.4 MG/DL (ref 0.2–1)
BLD GP AB SCN SERPL QL: NEGATIVE
BUN SERPL-MCNC: 30 MG/DL (ref 5–25)
CALCIUM SERPL-MCNC: 8.5 MG/DL (ref 8.3–10.1)
CHLORIDE SERPL-SCNC: 101 MMOL/L (ref 100–108)
CO2 SERPL-SCNC: 28 MMOL/L (ref 21–32)
CREAT SERPL-MCNC: 1.51 MG/DL (ref 0.6–1.3)
ERYTHROCYTE [DISTWIDTH] IN BLOOD BY AUTOMATED COUNT: 15.6 % (ref 11.6–15.1)
FERRITIN SERPL-MCNC: 759 NG/ML (ref 8–388)
FOLATE SERPL-MCNC: 6.6 NG/ML (ref 3.1–17.5)
GFR SERPL CREATININE-BSD FRML MDRD: 30 ML/MIN/1.73SQ M
GLUCOSE SERPL-MCNC: 163 MG/DL (ref 65–140)
HCT VFR BLD AUTO: 25.2 % (ref 34.8–46.1)
HGB BLD-MCNC: 8 G/DL (ref 11.5–15.4)
INR PPP: 3.89 (ref 0.84–1.19)
IRON SATN MFR SERPL: 11 %
IRON SERPL-MCNC: 17 UG/DL (ref 50–170)
MCH RBC QN AUTO: 30.2 PG (ref 26.8–34.3)
MCHC RBC AUTO-ENTMCNC: 31.7 G/DL (ref 31.4–37.4)
MCV RBC AUTO: 95 FL (ref 82–98)
PLATELET # BLD AUTO: 78 THOUSANDS/UL (ref 149–390)
PMV BLD AUTO: 11.5 FL (ref 8.9–12.7)
POTASSIUM SERPL-SCNC: 4 MMOL/L (ref 3.5–5.3)
PROCALCITONIN SERPL-MCNC: 0.26 NG/ML
PROT SERPL-MCNC: 6 G/DL (ref 6.4–8.2)
PROTHROMBIN TIME: 36.4 SECONDS (ref 11.6–14.5)
RBC # BLD AUTO: 2.65 MILLION/UL (ref 3.81–5.12)
RH BLD: POSITIVE
SODIUM SERPL-SCNC: 136 MMOL/L (ref 136–145)
SPECIMEN EXPIRATION DATE: NORMAL
TIBC SERPL-MCNC: 151 UG/DL (ref 250–450)
VIT B12 SERPL-MCNC: 468 PG/ML (ref 100–900)
WBC # BLD AUTO: 9.77 THOUSAND/UL (ref 4.31–10.16)

## 2020-06-30 PROCEDURE — 82746 ASSAY OF FOLIC ACID SERUM: CPT | Performed by: STUDENT IN AN ORGANIZED HEALTH CARE EDUCATION/TRAINING PROGRAM

## 2020-06-30 PROCEDURE — 83540 ASSAY OF IRON: CPT | Performed by: STUDENT IN AN ORGANIZED HEALTH CARE EDUCATION/TRAINING PROGRAM

## 2020-06-30 PROCEDURE — 82607 VITAMIN B-12: CPT | Performed by: STUDENT IN AN ORGANIZED HEALTH CARE EDUCATION/TRAINING PROGRAM

## 2020-06-30 PROCEDURE — 86850 RBC ANTIBODY SCREEN: CPT | Performed by: STUDENT IN AN ORGANIZED HEALTH CARE EDUCATION/TRAINING PROGRAM

## 2020-06-30 PROCEDURE — 80053 COMPREHEN METABOLIC PANEL: CPT | Performed by: STUDENT IN AN ORGANIZED HEALTH CARE EDUCATION/TRAINING PROGRAM

## 2020-06-30 PROCEDURE — 82728 ASSAY OF FERRITIN: CPT | Performed by: STUDENT IN AN ORGANIZED HEALTH CARE EDUCATION/TRAINING PROGRAM

## 2020-06-30 PROCEDURE — 85027 COMPLETE CBC AUTOMATED: CPT | Performed by: STUDENT IN AN ORGANIZED HEALTH CARE EDUCATION/TRAINING PROGRAM

## 2020-06-30 PROCEDURE — 99222 1ST HOSP IP/OBS MODERATE 55: CPT | Performed by: INTERNAL MEDICINE

## 2020-06-30 PROCEDURE — 99233 SBSQ HOSP IP/OBS HIGH 50: CPT | Performed by: STUDENT IN AN ORGANIZED HEALTH CARE EDUCATION/TRAINING PROGRAM

## 2020-06-30 PROCEDURE — 86900 BLOOD TYPING SEROLOGIC ABO: CPT | Performed by: STUDENT IN AN ORGANIZED HEALTH CARE EDUCATION/TRAINING PROGRAM

## 2020-06-30 PROCEDURE — 84145 PROCALCITONIN (PCT): CPT | Performed by: STUDENT IN AN ORGANIZED HEALTH CARE EDUCATION/TRAINING PROGRAM

## 2020-06-30 PROCEDURE — 83550 IRON BINDING TEST: CPT | Performed by: STUDENT IN AN ORGANIZED HEALTH CARE EDUCATION/TRAINING PROGRAM

## 2020-06-30 PROCEDURE — 86901 BLOOD TYPING SEROLOGIC RH(D): CPT | Performed by: STUDENT IN AN ORGANIZED HEALTH CARE EDUCATION/TRAINING PROGRAM

## 2020-06-30 PROCEDURE — 85610 PROTHROMBIN TIME: CPT | Performed by: STUDENT IN AN ORGANIZED HEALTH CARE EDUCATION/TRAINING PROGRAM

## 2020-06-30 PROCEDURE — 99223 1ST HOSP IP/OBS HIGH 75: CPT | Performed by: INTERNAL MEDICINE

## 2020-06-30 RX ADMIN — FERROUS SULFATE TAB 325 MG (65 MG ELEMENTAL FE) 325 MG: 325 (65 FE) TAB at 09:12

## 2020-06-30 RX ADMIN — PANTOPRAZOLE SODIUM 20 MG: 20 TABLET, DELAYED RELEASE ORAL at 05:19

## 2020-06-30 RX ADMIN — Medication 2 G: at 09:12

## 2020-06-30 RX ADMIN — METHYLPREDNISOLONE SODIUM SUCCINATE 40 MG: 40 INJECTION, POWDER, FOR SOLUTION INTRAMUSCULAR; INTRAVENOUS at 09:11

## 2020-06-30 RX ADMIN — METHYLPREDNISOLONE SODIUM SUCCINATE 40 MG: 40 INJECTION, POWDER, FOR SOLUTION INTRAMUSCULAR; INTRAVENOUS at 21:14

## 2020-06-30 NOTE — CONSULTS
Consultation - Palliative & Supportive Care   Jacque Armstrong  80 y o   female  /-01   MRN: [id number]  Encounter: [id number]    ASSESSMENT:    Patient Active Problem List   Diagnosis    Mixed dyslipidemia    Benign hypertension with chronic kidney disease, stage III ([facility]    CKD (chronic kidney disease) stage 3, GFR 30-59 ml/min (HCC)    UTI (urinary tract infection)    Hypoxia    Generalized weakness    Neurogenic bladder    History of DVT (deep vein thrombosis)    Rib fractures    Anemia    Subtherapeutic international normalized ratio (INR)    [facility]    Hyponatremia    Hyperkalemia    Hematuria    Urinary tract infection associated with indwelling urethral catheter (HCC)    Hydroureteronephrosis    Acute respiratory failure with hypoxemia ([facility] )       89F with Hx of neurogenic bladder with indwelling lentz cath, Hx of breast and ovarian ca s/p surgeries and chemo, who is found to have bladder wall thickening and elevated tumor markers, concerning for bladder cancer  Cystoscopy with biopsy is recommended for definitive diagnosis, but given invasiveness, recommending IR guided LN biopsy first  Per review of notes, multiple conversations were had with patient and patient's DIL about plans - comfort cares vs cancer-directed cares  PCS for goals  PLAN:    1  Goals:    To summarize, after much discussion via telephone conference with patient's 3 sons, DIL/Regla, and niece/Adolfo, family decided to forego any more treatments/managements for the bladder thickening concerning for bladder cancer and to transition her instead to routine hospice cares at home to focus on her comfort as she approaches the end of life, which may be weeks to months   They have also decided to transition her from a full code to a level 3 -DNAR/DNI     CM referral for hospice is placed   [facility] [address]:    Time of Meetin:00 pm to 2:45 pm    Participants: Mariaa Chowdary (Health care agent), Raymondo Najjar (1st alternative), and [facility]; DIL/Regla (Dar's wife); and niece/Adolfo  Patient Participation: NA    Patient Support System: above mentioned names    Meeting Location: Over the phone via telephone conference    Advanced Directive of POLST available: no    A family meeting was held for Jennifer Luna  This meeting was necessary for determine the appropriate course of treatment  Topics of Discussion:  1  Acknowledgment of patient's seemingly progressive decline over the past few weeks  2  Acknowledgement of the findings suspicious of bladder cancer and the different options available for a definitive diagnosis - including bladder biopsy or LN biopsy, both of which carry risks of complications  3  Acknowledgement of possible cancer therapies (ie chemo/RT, etc) should this turns out to be cancer and the families' hesitation of having her go through that  4  Acknowledgement that at her current state, chemotherapy may do more harm than good  5  After much discussion amongst themselves, the mariaa decided that the best thing for Javier Lane is to forego any more management/work up of the bladder thickening, accepting the likelihood that if this is cancer, that this will continue to progress without treatment and her time will be shortened  6  We discussed comfort measures on hospice  7  We discussed the different levels of hospice cares - routine (home vs SNF) vs inpatient  After much discussion of qualifications for hospice cares, we have all decided to pursue routine cares at home  8  We discussed what home hospice cares would look like, including the hospice team  We acknowledged that most of the care will fall on the family at home unless they decide to pay out of pocket for additional help  After much discussion, they are interested in looking at home health aids that can provide additional help    9  They do not believe that SNF with hospice is a good choice, given the uncertainty of [id number]  10  We spoke about the possible symptoms/scenario that they can expect at home as her disease progresses and her health declines - including aspiration/shortness of breath that she is currently experiencing now  They are made aware that no feeding tubes will be placed on hospice and we will focus on making sure her symptoms are managed  10  We also spoke about code status  After much discussion, they do not believe that her frail body can withstand CPR and they wouldn't want her to be on the ventilator  I will change her code status to DNAR/DNI  Other Content of Meeting:  Time spent providing emotional support        Code status: Level 3 - DNAR and DNI   Decisional apparatus:  Patient does not have capacity to make medical decisions on my exam today  If such capacity is lost, patient's substitute decision maker would default to son/Jordan first then son/Bhargav second by [place]  Advance Directive / Living Will / POLST:  None on file    2  Social Support:   Supportive listening provided   As above      3  Symptom management:   Will provide comfort cares script when nearing d/c    Controlled Substance Review    [place] PDMP or [place]  reviewed: No red flags were identified; safe to proceed with prescription         I have reviewed the patient's controlled substance dispensing history in the Prescription Drug Monitoring Program in compliance with the [facility] regulations before prescribing any controlled substances  We appreciate the opportunity to participate in this patient's care  We will continue to follow  Please do not hesitate to contact our on-call provider through our clinic answering service at [phone] should you have acute symptom control concerns      IDENTIFICATION:  Inpatient consult to Palliative Care  Consult performed by: Maryann Whiting MD  Consult ordered by: Darrius Hebert MD        Reason for Consult / Principal Problem: goals of care conversation    HISTORY OF PRESENT ILLNESS:    Theone Damian is a 80 y o  female with Hx of neurogenic bladder with indwelling lentz cath, Hx of breast and ovarian ca s/p surgeries and chemo, who is found to have bladder wall thickening and elevated tumor markers, concerning for bladder cancer  Cystoscopy with biopsy is recommended for definitive diagnosis, but given invasiveness, recommending IR guided LN biopsy first  Per review of notes, multiple conversations were had with patient and patient's DIL about plans - comfort cares vs cancer-directed cares  PCS for goals  Interview and exam limited by: some confusion    Review of Systems   Unable to perform ROS: Other (Patient seemingly confused, but appears largely comfortable and denies pain)       Past Medical History:   Diagnosis Date    Cancer ([facility] )     BREAST , OVARIAN    Factor 5 Leiden mutation, heterozygous ([facility] )     Hypertension      Past Surgical History:   Procedure Laterality Date    BREAST SURGERY      HYSTERECTOMY       Social History     Socioeconomic History    Marital status:      Spouse name: Not on file    Number of children: Not on file    Years of education: Not on file    Highest education level: Not on file   Occupational History    Not on file   Social Needs    Financial resource strain: Not on file    Food insecurity:     Worry: Not on file     Inability: Not on file    Transportation needs:     Medical: Not on file     Non-medical: Not on file   Tobacco Use    Smoking status: Former Smoker    Smokeless tobacco: Never Used   Substance and Sexual Activity    Alcohol use:  Yes     Alcohol/week: 1 0 - 2 0 standard drinks     Types: 1 - 2 Glasses of wine per week    Drug use: No    Sexual activity: Not on file   Lifestyle    Physical activity:     Days per week: Not on file     Minutes per session: Not on file    Stress: Not on file   Relationships    Social connections:     Talks on phone: Not on file     Gets together: Not on file     Attends Zoroastrian service: Not on file     Active member of club or organization: Not on file     Attends meetings of clubs or organizations: Not on file     Relationship status: Not on file    Intimate partner violence:     Fear of current or ex partner: Not on file     Emotionally abused: Not on file     Physically abused: Not on file     Forced sexual activity: Not on file   Other Topics Concern    Not on file   Social History Narrative    Not on file     History reviewed  No pertinent family history  MEDICATIONS / ALLERGIES:  all current active meds have been reviewed    Allergies   Allergen Reactions    Amoxicillin      Pt does not remember     Ciprofloxacin     Penicillins        OBJECTIVE:  /62 (BP Location: Left arm)   Pulse 72   Temp 98 2 °F (36 8 °C) (Axillary)   Resp 18   Ht 5' (1 524 m)   Wt 61 kg (134 lb 7 7 oz)   SpO2 97%   BMI 26 26 kg/m²   Physical Exam:  Constitutional: Appears frail, cachectic, debilitated, chronically ill, pleasant, non-toxic  In no acute physical or emotional distress  Head: Normocephalic and atraumatic  Temporal, periorbital mm wasting  Eyes: EOM are normal  No ocular discharge  No scleral icterus  Neck: No visible adenopathy or masses  Respiratory: Effort normal  No stridor  No respiratory distress  Gastrointestinal: No abdominal distension  Musculoskeletal: No edema  Neurological: Awake, somewhat confused, slow to respond, hard of hearing  Skin: Dry, no diaphoresis  Pale  Psychiatric: Displays a normal mood and affect  Lab Results: I have personally reviewed pertinent labs  Imaging Studies: I have personally reviewed pertinent reports  EKG, Pathology, and Other Studies: I have personally reviewed pertinent reports  Counseling / Coordination of Care:  Counseling / Coordination of Care  Total floor / unit time spent today 60+ minutes   Greater than 50% of total time was spent with the patient and / or family counseling and / or coordination of care  A description of the counseling / coordination of care: provided medical updates, discussed palliative care, discussed hospice care, determined competency, determined goals of care, determined POA, determined social/family support, discussed plans of care, discussed symptom management, provided psychosocial support       Dahlia Chambers MD  [facility] and Supportive Care  [phone]

## 2020-06-30 NOTE — ASSESSMENT & PLAN NOTE
Patient received CT with contrast earlier as well as Lasix  Now noted with elevated BUN and creatinine  29/1 41  Today BUN and creatinine noted slightly worsened with 30/1 51  Monitor on renal function with a p o  Intake  Will consider nephrology consult if not improving or worsening

## 2020-06-30 NOTE — PLAN OF CARE
Problem: Prexisting or High Potential for Compromised Skin Integrity  Goal: Skin integrity is maintained or improved  Description  INTERVENTIONS:  - Identify patients at risk for skin breakdown  - Assess and monitor skin integrity  - Assess and monitor nutrition and hydration status  - Monitor labs   - Assess for incontinence   - Turn and reposition patient  - Assist with mobility/ambulation  - Relieve pressure over bony prominences  - Avoid friction and shearing  - Provide appropriate hygiene as needed including keeping skin clean and dry  - Evaluate need for skin moisturizer/barrier cream  - Collaborate with interdisciplinary team   - Patient/family teaching  - Consider wound care consult   Outcome: Progressing     Problem: Potential for Falls  Goal: Patient will remain free of falls  Description  INTERVENTIONS:  - Assess patient frequently for physical needs  -  Identify cognitive and physical deficits and behaviors that affect risk of falls    -  [place] fall precautions as indicated by assessment   - Educate patient/family on patient safety including physical limitations  - Instruct patient to call for assistance with activity based on assessment  - Modify environment to reduce risk of injury  - Consider OT/PT consult to assist with strengthening/mobility  Outcome: Progressing     Problem: PAIN - ADULT  Goal: Verbalizes/displays adequate comfort level or baseline comfort level  Description  Interventions:  - Encourage patient to monitor pain and request assistance  - Assess pain using appropriate pain scale  - Administer analgesics based on type and severity of pain and evaluate response  - Implement non-pharmacological measures as appropriate and evaluate response  - Consider cultural and social influences on pain and pain management  - Notify physician/advanced practitioner if interventions unsuccessful or patient reports new pain  Outcome: Progressing     Problem: SAFETY ADULT  Goal: Maintain or return to baseline ADL function  Description  INTERVENTIONS:  -  Assess patient's ability to carry out ADLs; assess patient's baseline for ADL function and identify physical deficits which impact ability to perform ADLs (bathing, care of mouth/teeth, toileting, grooming, dressing, etc )  - Assess/evaluate cause of self-care deficits   - Assess range of motion  - Assess patient's mobility; develop plan if impaired  - Assess patient's need for assistive devices and provide as appropriate  - Encourage maximum independence but intervene and supervise when necessary  - Involve family in performance of ADLs  - Assess for home care needs following discharge   - Consider OT consult to assist with ADL evaluation and planning for discharge  - Provide patient education as appropriate  Outcome: Progressing  Goal: Maintain or return mobility status to optimal level  Description  INTERVENTIONS:  - Assess patient's baseline mobility status (ambulation, transfers, stairs, etc )    - Identify cognitive and physical deficits and behaviors that affect mobility  - Identify mobility aids required to assist with transfers and/or ambulation (gait belt, sit-to-stand, lift, walker, cane, etc )  - [place] fall precautions as indicated by assessment  - Record patient progress and toleration of activity level on Mobility SBAR; progress patient to next Phase/Stage  - Instruct patient to call for assistance with activity based on assessment  - Consider rehabilitation consult to assist with strengthening/weightbearing, etc   Outcome: Progressing     Problem: DISCHARGE PLANNING  Goal: Discharge to home or other facility with appropriate resources  Description  INTERVENTIONS:  - Identify barriers to discharge w/patient and caregiver  - Arrange for needed discharge resources and transportation as appropriate  - Identify discharge learning needs (meds, wound care, etc )  - Arrange for interpretive services to assist at discharge as needed  - Refer to Case Management Department for coordinating discharge planning if the patient needs post-hospital services based on physician/advanced practitioner order or complex needs related to functional status, cognitive ability, or social support system  Outcome: Progressing     Problem: Knowledge Deficit  Goal: Patient/family/caregiver demonstrates understanding of disease process, treatment plan, medications, and discharge instructions  Description  Complete learning assessment and assess knowledge base  Interventions:  - Provide teaching at level of understanding  - Provide teaching via preferred learning methods  Outcome: Progressing     Problem: Nutrition/Hydration-ADULT  Goal: Nutrient/Hydration intake appropriate for improving, restoring or maintaining nutritional needs  Description  Monitor and assess patient's nutrition/hydration status for malnutrition  Collaborate with interdisciplinary team and initiate plan and interventions as ordered  Monitor patient's weight and dietary intake as ordered or per policy  Utilize nutrition screening tool and intervene as necessary  Determine patient's food preferences and provide high-protein, high-caloric foods as appropriate       INTERVENTIONS:  - Monitor oral intake, urinary output, labs, and treatment plans  - Assess nutrition and hydration status and recommend course of action  - Evaluate amount of meals eaten  - Assist patient with eating if necessary   - Allow adequate time for meals  - Recommend/ encourage appropriate diets, oral nutritional supplements, and vitamin/mineral supplements  - Order, calculate, and assess calorie counts as needed  - Recommend, monitor, and adjust tube feedings based on assessed needs  - Assess need for intravenous fluids  - Provide  nutrition/hydration education as appropriate  - Include patient/family/caregiver in decisions related to nutrition   Outcome: Progressing     Problem: GENITOURINARY - ADULT  Goal: Maintains or returns to baseline urinary function  Description  INTERVENTIONS:  - Assess urinary function  - Encourage oral fluids to ensure adequate hydration if ordered  - Administer IV fluids as ordered to ensure adequate hydration  - Administer ordered medications as needed  - Offer frequent toileting  - Follow urinary retention protocol if ordered  Outcome: Progressing  Goal: Urinary catheter remains patent  Description  INTERVENTIONS:  - Assess patency of urinary catheter  - If patient has a chronic lentz, consider changing catheter if non-functioning  - Follow guidelines for intermittent irrigation of non-functioning urinary catheter  Outcome: Progressing

## 2020-06-30 NOTE — PROGRESS NOTES
Progress Note - Pam Bowden 9/20/1930, 80 y o  female MRN: [id number]    Unit/Bed#: -01 Encounter: [id number]    Primary Care Provider: Soy Patel DO   Date and time admitted to hospital: 6/22/2020 10:22 AM        * Urinary tract infection associated with indwelling urethral catheter [facility])  Assessment & Plan  Patient has a history of a neurogenic bladder with an indwelling Smith catheter, was scheduled for urology follow-up outpatient 7/24  She has a few day history of cloudy urine, generalized weakness, and some episodes a hematuria last week  - UA showed innumerable bacteria  - creatinine 1 17, history of CKD 3  - CT of the abdomen showed hydroureternephrosis, with some bladder wall thickening and need for follow-up cystoscopy to rule out bladder cancer  Family wanted to forego evaluation as far as that goes secondary to needing general anesthesia  Can likely discontinue the antibiotics  It is likely all colonization  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  Acute respiratory failure with hypoxemia [facility])  Assessment & Plan  Patient has been requiring oxygen here  They did bump her up to 5 L  Patient is still on 5 L right now  I think this could be volume overload  I did get a CT scan of the chest yesterday which showed some opacities  COVID-19 test negative x2    Possibly due to aspiration pneumonitis  Today clinically patient appears comfortable not in distress  O2 saturation improved to 97% on non-rebreather  Repeat chest x-ray shows course lung findings noted worsening from previous study: hypoinflation vs congestion  Echo report noted with EF of 60% with grade 1 diastolic dysfunction  Clinically does not appear to be overtly fluid overloaded  Procalcitonin within normal limits    Hold Lasix now in the settings of elevated BUN creatinine  Will give trial of IV Solu-Medrol  Continue to monitor renal function, consider pulmonology consult  Continue with aspiration precaution  Speech and swallow evaluation  Overall poor prognosis  Hydroureteronephrosis  Assessment & Plan  Patient does not want any aggressive surgical intervention  I had discussion with patient's daughter-in-law at length today and plan is to follow-up with palliative care consult tomorrow for goals of care discussion  Anemia  Assessment & Plan  No signs of active overt bleeding noted  Hemodynamically stable  Currently hemoglobin stable around 7-8 range  Monitor CBC and transfuse below 7  History of DVT (deep vein thrombosis)  Assessment & Plan  I have restarted the patient's Coumadin today  Patient has a history of factor 5 Leiden  INR is now therapeutic  Will put the patient on her 4 mg daily as she normally takes    Generalized weakness  Assessment & Plan  - patient has had a few days of generalized weakness, no her falls, will have PT OT evaluate, patient lives with son and is having trouble getting around the house and oldest son who was at bedside would like patient to be considered for short-term rehab  Needs rehab but family is deciding what to do based on potential prognosis if this is worsening cancer  Benign hypertension with chronic kidney disease, stage III [facility])  Assessment & Plan  Patient received CT with contrast earlier as well as Lasix  Now noted with elevated BUN and creatinine  29/1 41  Today BUN and creatinine noted slightly worsened with 30/1 51  Monitor on renal function with a p o  Intake  Will consider nephrology consult if not improving or worsening  VTE Pharmacologic Prophylaxis:   Pharmacologic: Warfarin (Coumadin)  Patient Centered Rounds: I have performed bedside rounds with nursing staff today  Education and Discussions with Family / Patient:  Patient's niece at bedside    Will call family at later time     Time Spent for Care: 30 minutes  More than 50% of total time spent on counseling and coordination of care as described above  Current Length of Stay: 8 day(s)    Current Patient Status: Inpatient   Certification Statement: The patient will continue to require additional inpatient hospital stay due to Above    Discharge Plan: tbd    Code Status: Level 1 - Full Code      Subjective: This morning patient had episode of aspiration and bouts of cough during breakfast   Improved with suctioning  O2 saturation improved on non-rebreather  Clinically not in acute respiratory distress  Patient is ill-appearing  currently hemodynamically stable  Patient denies any aggressive and invasive interventions  Objective:     Vitals:   Temp (24hrs), Av 3 °F (36 8 °C), Min:97 9 °F (36 6 °C), Max:98 9 °F (37 2 °C)    Temp:  [97 9 °F (36 6 °C)-98 9 °F (37 2 °C)] 98 2 °F (36 8 °C)  HR:  [72-94] 72  Resp:  [16-18] 18  BP: (110-146)/(58-66) 136/62  SpO2:  [87 %-97 %] 97 %  Body mass index is 26 26 kg/m²  Input and Output Summary (last 24 hours): Intake/Output Summary (Last 24 hours) at 2020 1245  Last data filed at 2020 0349  Gross per 24 hour   Intake 200 ml   Output 900 ml   Net -700 ml       Physical Exam:     Physical Exam   Constitutional: No distress  Chronically ill-appearing, not in acute distress  HENT:   Head: Normocephalic and atraumatic  Eyes: Pupils are equal, round, and reactive to light  EOM are normal    Neck: Normal range of motion  Neck supple  No JVD present  Cardiovascular: Normal rate and regular rhythm  Pulmonary/Chest: Effort normal and breath sounds normal  No stridor  No respiratory distress  Abdominal: Soft  Bowel sounds are normal  She exhibits no distension  There is no tenderness  Musculoskeletal: Normal range of motion  Neurological:   Patient is awake, alert, oriented to person, not oriented to place or time    Currently cooperative during exam  Poor historian  Skin: She is not diaphoretic  Psychiatric: Her behavior is normal          Additional Data:     Labs:    Results from last 7 days   Lab Units 06/30/20  0439   WBC Thousand/uL 9 77   HEMOGLOBIN g/dL 8 0*   HEMATOCRIT % 25 2*   PLATELETS Thousands/uL 78*     Results from last 7 days   Lab Units 06/30/20  0439   SODIUM mmol/L 136   POTASSIUM mmol/L 4 0   CHLORIDE mmol/L 101   CO2 mmol/L 28   BUN mg/dL 30*   CREATININE mg/dL 1 51*   ANION GAP mmol/L 7   CALCIUM mg/dL 8 5   ALBUMIN g/dL 2 0*   TOTAL BILIRUBIN mg/dL 0 40   ALK PHOS U/L 185*   ALT U/L 24   AST U/L 27   GLUCOSE RANDOM mg/dL 163*     Results from last 7 days   Lab Units 06/28/20  0504   INR  2 33*     Results from last 7 days   Lab Units 06/27/20  2123   POC GLUCOSE mg/dl 164*                   * I Have Reviewed All Lab Data Listed Above  * Additional Pertinent Lab Tests Reviewed: All Labs Within Last 24 Hours Reviewed      Recent Cultures (last 7 days):           Last 24 Hours Medication List:     Current Facility-Administered Medications:  acetaminophen 650 mg Oral Q6H PRN Polo Triplett, DO   albuterol 2 puff Inhalation Q4H PRN Polo Triplett, DO   ferrous sulfate 325 mg Oral Daily With Breakfast Celia Ayana, DO   fluticasone 1 spray Nasal Daily PRN [facility] Triplett, DO   methylPREDNISolone sodium succinate 40 mg Intravenous Q12H [facility] custodial Masoud BLAIR MD   pantoprazole 20 mg Oral Early Morning Celia Ayana, DO   polyethylene glycol 17 g Oral Daily PRN [facility] Triplett, DO   sodium chloride 1 spray Each Nare Q1H PRN Dayana Jose MD   sodium chloride 2 g Oral TID With Meals Polo Uziel, DO   warfarin 5 mg Oral Daily (warfarin) Dayana Jose MD        Today, Patient Was Seen By: Liudmila Buenrostro MD    ** Please Note: Dictation voice to text software may have been used in the creation of this document   **

## 2020-06-30 NOTE — PLAN OF CARE
Problem: Prexisting or High Potential for Compromised Skin Integrity  Goal: Skin integrity is maintained or improved  Description  INTERVENTIONS:  - Identify patients at risk for skin breakdown  - Assess and monitor skin integrity  - Assess and monitor nutrition and hydration status  - Monitor labs   - Assess for incontinence   - Turn and reposition patient  - Assist with mobility/ambulation  - Relieve pressure over bony prominences  - Avoid friction and shearing  - Provide appropriate hygiene as needed including keeping skin clean and dry  - Evaluate need for skin moisturizer/barrier cream  - Collaborate with interdisciplinary team   - Patient/family teaching  - Consider wound care consult   Outcome: Progressing     Problem: Potential for Falls  Goal: Patient will remain free of falls  Description  INTERVENTIONS:  - Assess patient frequently for physical needs  -  Identify cognitive and physical deficits and behaviors that affect risk of falls    -  [place] fall precautions as indicated by assessment   - Educate patient/family on patient safety including physical limitations  - Instruct patient to call for assistance with activity based on assessment  - Modify environment to reduce risk of injury  - Consider OT/PT consult to assist with strengthening/mobility  Outcome: Progressing     Problem: PAIN - ADULT  Goal: Verbalizes/displays adequate comfort level or baseline comfort level  Description  Interventions:  - Encourage patient to monitor pain and request assistance  - Assess pain using appropriate pain scale  - Administer analgesics based on type and severity of pain and evaluate response  - Implement non-pharmacological measures as appropriate and evaluate response  - Consider cultural and social influences on pain and pain management  - Notify physician/advanced practitioner if interventions unsuccessful or patient reports new pain  Outcome: Progressing     Problem: SAFETY ADULT  Goal: Maintain or return to baseline ADL function  Description  INTERVENTIONS:  -  Assess patient's ability to carry out ADLs; assess patient's baseline for ADL function and identify physical deficits which impact ability to perform ADLs (bathing, care of mouth/teeth, toileting, grooming, dressing, etc )  - Assess/evaluate cause of self-care deficits   - Assess range of motion  - Assess patient's mobility; develop plan if impaired  - Assess patient's need for assistive devices and provide as appropriate  - Encourage maximum independence but intervene and supervise when necessary  - Involve family in performance of ADLs  - Assess for home care needs following discharge   - Consider OT consult to assist with ADL evaluation and planning for discharge  - Provide patient education as appropriate  Outcome: Progressing  Goal: Maintain or return mobility status to optimal level  Description  INTERVENTIONS:  - Assess patient's baseline mobility status (ambulation, transfers, stairs, etc )    - Identify cognitive and physical deficits and behaviors that affect mobility  - Identify mobility aids required to assist with transfers and/or ambulation (gait belt, sit-to-stand, lift, walker, cane, etc )  - [place] fall precautions as indicated by assessment  - Record patient progress and toleration of activity level on Mobility SBAR; progress patient to next Phase/Stage  - Instruct patient to call for assistance with activity based on assessment  - Consider rehabilitation consult to assist with strengthening/weightbearing, etc   Outcome: Progressing     Problem: DISCHARGE PLANNING  Goal: Discharge to home or other facility with appropriate resources  Description  INTERVENTIONS:  - Identify barriers to discharge w/patient and caregiver  - Arrange for needed discharge resources and transportation as appropriate  - Identify discharge learning needs (meds, wound care, etc )  - Arrange for interpretive services to assist at discharge as needed  - Refer to Case Management Department for coordinating discharge planning if the patient needs post-hospital services based on physician/advanced practitioner order or complex needs related to functional status, cognitive ability, or social support system  Outcome: Progressing     Problem: Knowledge Deficit  Goal: Patient/family/caregiver demonstrates understanding of disease process, treatment plan, medications, and discharge instructions  Description  Complete learning assessment and assess knowledge base  Interventions:  - Provide teaching at level of understanding  - Provide teaching via preferred learning methods  Outcome: Progressing     Problem: Nutrition/Hydration-ADULT  Goal: Nutrient/Hydration intake appropriate for improving, restoring or maintaining nutritional needs  Description  Monitor and assess patient's nutrition/hydration status for malnutrition  Collaborate with interdisciplinary team and initiate plan and interventions as ordered  Monitor patient's weight and dietary intake as ordered or per policy  Utilize nutrition screening tool and intervene as necessary  Determine patient's food preferences and provide high-protein, high-caloric foods as appropriate       INTERVENTIONS:  - Monitor oral intake, urinary output, labs, and treatment plans  - Assess nutrition and hydration status and recommend course of action  - Evaluate amount of meals eaten  - Assist patient with eating if necessary   - Allow adequate time for meals  - Recommend/ encourage appropriate diets, oral nutritional supplements, and vitamin/mineral supplements  - Order, calculate, and assess calorie counts as needed  - Recommend, monitor, and adjust tube feedings based on assessed needs  - Assess need for intravenous fluids  - Provide  nutrition/hydration education as appropriate  - Include patient/family/caregiver in decisions related to nutrition   Outcome: Progressing     Problem: GENITOURINARY - ADULT  Goal: Maintains or returns to baseline urinary function  Description  INTERVENTIONS:  - Assess urinary function  - Encourage oral fluids to ensure adequate hydration if ordered  - Administer IV fluids as ordered to ensure adequate hydration  - Administer ordered medications as needed  - Offer frequent toileting  - Follow urinary retention protocol if ordered  Outcome: Progressing  Goal: Urinary catheter remains patent  Description  INTERVENTIONS:  - Assess patency of urinary catheter  - If patient has a chronic lentz, consider changing catheter if non-functioning  - Follow guidelines for intermittent irrigation of non-functioning urinary catheter  Outcome: Progressing

## 2020-06-30 NOTE — CONSULTS
Consultation - Pulmonary Medicine   Luis Armando Tuttle 80 y o  female MRN: [id number]  Unit/Bed#: -01 Encounter: [id number]      Assessment:  Acute hypoxic respiratory failure  Abnormal chest imaging with ground-glass opacities  History of breast and ovarian cancer  Factor V Leiden  History of DVT    Plan:   Patient was on non-rebreather earlier this morning  Currently saturating well on 6 L  CTA negative for PE  Few peripheral areas of ground-glass opacity, new since 05/20 representing likely infectious or inflammatory etiology  Enlarging prevascular nodes, questionably metastatic  There is also a small left pleural effusion  Chest x-ray 6/29 showing some worsening of the coarse lung markings   COVID 19 negative x2, she is afebrile  No leukocytosis and procalcitonin is normal   Was receiving cefepime for UTI, antibiotics were stopped yesterday  Started on IV Solu-Medrol yesterday  Currently on 40 mg q 12 hours  Would continue current dose and monitor response  Incentive spirometry encouraged  She was being diuresed however Lasix currently being held in light of worsening BUN and creatinine  Follow-up with speech and swallow evaluation  Continue aspiration precautions  Oncology following  Patient would not be a good candidate for chemo currently  She is on Coumadin, INR 2 33  CTA negative for PE as noted above  Palliative care consulted for further goals of care discussions  Would need follow-up imaging of the chest    Spoke with primary team today    History of Present Illness   Physician Requesting Consult: Sherice Cuevas MD  Reason for Consult / Principal Problem:  Hypoxia  Hx and PE limited by:  None  HPI: Luis Armando Tuttle is a 80y o  year old female with past medical history including breast cancer, ovarian cancer, Factor V Leiden, DVT, neurogenic bladder with chronic indwelling Smith who presents with hypoxia  She was recently hospitalized from 06/08 to 6/17 with weakness and UTI symptoms  She again presented on 06/22 with similar symptoms  She did not have any respiratory symptoms at the time  Around 6/26, she began requiring supplemental oxygen by nasal cannula  On 06/27, a CTA was done which was negative for PE and showed ground-glass opacities  A chest x-ray was done yesterday on 06/29 showing increased course marking possibly secondary to hypoinflation or congestion  L pleural effusion also seen  She was being diuresed however this was stopped due to worsening BUN and Cr  IV Solu-Medrol has been started to see if this helps with inflammation  She is currently being followed by Oncology, there is left pelvic sidewall lymphadenopathy and bladder wall thickening which is concerning for possible malignancy  Patient and family are trying to decide whether to proceed with a workup versus transition to comfort care  Patient states her breathing is about the same as yesterday  There is a dry cough  No wheezing  No chest pain  She denies dysphagia  Inpatient consult to Pulmonology  Consult performed by: Manan Sanchez PA-C  Consult ordered by: Naty Jesus MD          Review of Systems   Constitutional: Positive for fatigue  Negative for chills and fever  Respiratory: Positive for cough and shortness of breath  Negative for wheezing  Cardiovascular: Negative for chest pain and leg swelling  Musculoskeletal: Positive for back pain  All other systems reviewed and are negative        Historical Information   Past Medical History:   Diagnosis Date    Cancer ([facility] )     BREAST , OVARIAN    Factor 5 Leiden mutation, heterozygous ([facility] )     Hypertension      Past Surgical History:   Procedure Laterality Date    BREAST SURGERY      HYSTERECTOMY       Social History   Social History     Substance and Sexual Activity   Alcohol Use Yes    Alcohol/week: 1 0 - 2 0 standard drinks    Types: 1 - 2 Glasses of wine per week     Social History     Substance and Sexual Activity   Drug Use No E-Cigarette/Vaping     E-Cigarette/Vaping Substances     Social History     Tobacco Use   Smoking Status Former Smoker   Smokeless Tobacco Never Used     Occupational History:     Family History: History reviewed  No pertinent family history  Meds/Allergies   all current active meds have been reviewed    Allergies   Allergen Reactions    Amoxicillin      Pt does not remember     Ciprofloxacin     Penicillins        Objective   Vitals: Blood pressure 136/62, pulse 72, temperature 98 2 °F (36 8 °C), temperature source Axillary, resp  rate 18, height 5' (1 524 m), weight 61 kg (134 lb 7 7 oz), SpO2 97 %  ,Body mass index is 26 26 kg/m²  Intake/Output Summary (Last 24 hours) at 6/30/2020 1346  Last data filed at 6/30/2020 1313  Gross per 24 hour   Intake 320 ml   Output 700 ml   Net -380 ml     Invasive Devices     Peripheral Intravenous Line            Peripheral IV 06/25/20 Dorsal (posterior); Right Hand 4 days          Drain            Urethral Catheter Non-latex 18 Fr  13 days                Physical Exam   Constitutional: She is oriented to person, place, and time  She appears well-developed  No distress  Frail-appearing   HENT:   Head: Normocephalic and atraumatic  Right Ear: External ear normal    Left Ear: External ear normal    Nose: Nose normal    Eyes: Conjunctivae and EOM are normal  Right eye exhibits no discharge  Left eye exhibits no discharge  No scleral icterus  Neck: Normal range of motion  Neck supple  No tracheal deviation present  Cardiovascular: Normal rate, regular rhythm and normal heart sounds  Exam reveals no gallop and no friction rub  No murmur heard  Pulmonary/Chest: Effort normal  No respiratory distress  She has no wheezes  Decreased breath sounds   Abdominal: She exhibits no distension  There is no guarding  Musculoskeletal: Normal range of motion  She exhibits no edema, tenderness or deformity     Neurological: She is alert and oriented to person, place, and time  No cranial nerve deficit  Skin: Skin is warm and dry  No rash noted  She is not diaphoretic  No erythema  No pallor  Psychiatric: She has a normal mood and affect  Her behavior is normal  Judgment and thought content normal    Nursing note and vitals reviewed  Lab Results: I have personally reviewed pertinent lab results  Imaging Studies: I have personally reviewed pertinent reports  EKG, Pathology, and Other Studies: I have personally reviewed pertinent reports      VTE Prophylaxis: Warfarin (Coumadin)    Code Status: Level 1 - Full Code  Advance Directive and Living Will:      Power of :    POLST:

## 2020-06-30 NOTE — ASSESSMENT & PLAN NOTE
Patient has been requiring oxygen here  They did bump her up to 5 L  Patient is still on 5 L right now  I think this could be volume overload  I did get a CT scan of the chest yesterday which showed some opacities  COVID-19 test negative x2    Possibly due to aspiration pneumonitis  Today clinically patient appears comfortable not in distress  O2 saturation improved to 97% on non-rebreather  Repeat chest x-ray shows course lung findings noted worsening from previous study: hypoinflation vs congestion  Echo report noted with EF of 60% with grade 1 diastolic dysfunction  Clinically does not appear to be overtly fluid overloaded  Procalcitonin within normal limits  Hold Lasix now in the settings of elevated BUN creatinine  Will give trial of IV Solu-Medrol  Continue to monitor renal function, consider pulmonology consult  Continue with aspiration precaution  Speech and swallow evaluation  Overall poor prognosis

## 2020-07-01 LAB
ANION GAP SERPL CALCULATED.3IONS-SCNC: 9 MMOL/L (ref 4–13)
BASOPHILS # BLD AUTO: 0.01 THOUSANDS/ΜL (ref 0–0.1)
BASOPHILS NFR BLD AUTO: 0 % (ref 0–1)
BUN SERPL-MCNC: 39 MG/DL (ref 5–25)
CALCIUM SERPL-MCNC: 8.6 MG/DL (ref 8.3–10.1)
CHLORIDE SERPL-SCNC: 101 MMOL/L (ref 100–108)
CO2 SERPL-SCNC: 26 MMOL/L (ref 21–32)
CREAT SERPL-MCNC: 1.38 MG/DL (ref 0.6–1.3)
EOSINOPHIL # BLD AUTO: 0 THOUSAND/ΜL (ref 0–0.61)
EOSINOPHIL NFR BLD AUTO: 0 % (ref 0–6)
ERYTHROCYTE [DISTWIDTH] IN BLOOD BY AUTOMATED COUNT: 15.8 % (ref 11.6–15.1)
GFR SERPL CREATININE-BSD FRML MDRD: 34 ML/MIN/1.73SQ M
GLUCOSE SERPL-MCNC: 145 MG/DL (ref 65–140)
HCT VFR BLD AUTO: 25.6 % (ref 34.8–46.1)
HGB BLD-MCNC: 8 G/DL (ref 11.5–15.4)
IMM GRANULOCYTES # BLD AUTO: 0.11 THOUSAND/UL (ref 0–0.2)
IMM GRANULOCYTES NFR BLD AUTO: 1 % (ref 0–2)
INR PPP: 4.15 (ref 0.84–1.19)
LYMPHOCYTES # BLD AUTO: 0.79 THOUSANDS/ΜL (ref 0.6–4.47)
LYMPHOCYTES NFR BLD AUTO: 7 % (ref 14–44)
MCH RBC QN AUTO: 30 PG (ref 26.8–34.3)
MCHC RBC AUTO-ENTMCNC: 31.3 G/DL (ref 31.4–37.4)
MCV RBC AUTO: 96 FL (ref 82–98)
MONOCYTES # BLD AUTO: 0.38 THOUSAND/ΜL (ref 0.17–1.22)
MONOCYTES NFR BLD AUTO: 3 % (ref 4–12)
NEUTROPHILS # BLD AUTO: 10.34 THOUSANDS/ΜL (ref 1.85–7.62)
NEUTS SEG NFR BLD AUTO: 89 % (ref 43–75)
NRBC BLD AUTO-RTO: 0 /100 WBCS
PLATELET # BLD AUTO: 104 THOUSANDS/UL (ref 149–390)
PMV BLD AUTO: 11.9 FL (ref 8.9–12.7)
POTASSIUM SERPL-SCNC: 4.2 MMOL/L (ref 3.5–5.3)
PROCALCITONIN SERPL-MCNC: 0.17 NG/ML
PROTHROMBIN TIME: 38.3 SECONDS (ref 11.6–14.5)
RBC # BLD AUTO: 2.67 MILLION/UL (ref 3.81–5.12)
SODIUM SERPL-SCNC: 136 MMOL/L (ref 136–145)
WBC # BLD AUTO: 11.63 THOUSAND/UL (ref 4.31–10.16)

## 2020-07-01 PROCEDURE — 99223 1ST HOSP IP/OBS HIGH 75: CPT | Performed by: OBSTETRICS & GYNECOLOGY

## 2020-07-01 PROCEDURE — 84145 PROCALCITONIN (PCT): CPT | Performed by: STUDENT IN AN ORGANIZED HEALTH CARE EDUCATION/TRAINING PROGRAM

## 2020-07-01 PROCEDURE — 99232 SBSQ HOSP IP/OBS MODERATE 35: CPT | Performed by: INTERNAL MEDICINE

## 2020-07-01 PROCEDURE — 99232 SBSQ HOSP IP/OBS MODERATE 35: CPT | Performed by: PHYSICIAN ASSISTANT

## 2020-07-01 PROCEDURE — 99232 SBSQ HOSP IP/OBS MODERATE 35: CPT | Performed by: STUDENT IN AN ORGANIZED HEALTH CARE EDUCATION/TRAINING PROGRAM

## 2020-07-01 PROCEDURE — 92610 EVALUATE SWALLOWING FUNCTION: CPT

## 2020-07-01 PROCEDURE — 80048 BASIC METABOLIC PNL TOTAL CA: CPT | Performed by: STUDENT IN AN ORGANIZED HEALTH CARE EDUCATION/TRAINING PROGRAM

## 2020-07-01 PROCEDURE — 85610 PROTHROMBIN TIME: CPT | Performed by: STUDENT IN AN ORGANIZED HEALTH CARE EDUCATION/TRAINING PROGRAM

## 2020-07-01 PROCEDURE — 85025 COMPLETE CBC W/AUTO DIFF WBC: CPT | Performed by: STUDENT IN AN ORGANIZED HEALTH CARE EDUCATION/TRAINING PROGRAM

## 2020-07-01 RX ORDER — PREDNISONE 20 MG/1
40 TABLET ORAL DAILY
Status: DISCONTINUED | OUTPATIENT
Start: 2020-07-02 | End: 2020-07-03 | Stop reason: HOSPADM

## 2020-07-01 RX ORDER — MORPHINE SULFATE 100 MG/5ML
5 SOLUTION ORAL EVERY 2 HOUR PRN
Qty: 30 ML | Refills: 0 | Status: SHIPPED | OUTPATIENT
Start: 2020-07-01 | End: 2020-07-11

## 2020-07-01 RX ORDER — LORAZEPAM 0.5 MG/1
0.5 TABLET ORAL EVERY 8 HOURS PRN
Qty: 15 TABLET | Refills: 0 | Status: SHIPPED | OUTPATIENT
Start: 2020-07-01 | End: 2020-07-11

## 2020-07-01 RX ADMIN — FERROUS SULFATE TAB 325 MG (65 MG ELEMENTAL FE) 325 MG: 325 (65 FE) TAB at 10:09

## 2020-07-01 RX ADMIN — Medication 2 G: at 16:51

## 2020-07-01 RX ADMIN — Medication 2 G: at 10:09

## 2020-07-01 RX ADMIN — METHYLPREDNISOLONE SODIUM SUCCINATE 40 MG: 40 INJECTION, POWDER, FOR SOLUTION INTRAMUSCULAR; INTRAVENOUS at 10:09

## 2020-07-01 NOTE — ASSESSMENT & PLAN NOTE
Patient has been requiring oxygen here  They did bump her up to 5 L  Patient is still on 5 L right now  I think this could be volume overload  I did get a CT scan of the chest yesterday which showed some opacities  COVID-19 test negative x2    Possibly due to aspiration pneumonitis  Today clinically patient appears comfortable not in distress  O2 saturation improved to 97% on non-rebreather  Repeat chest x-ray shows course lung findings noted worsening from previous study: hypoinflation vs congestion  Echo report noted with EF of 60% with grade 1 diastolic dysfunction  Clinically does not appear to be overtly fluid overloaded  procalcitonin slightly elevated  Hold Lasix now in the settings of elevated BUN creatinine  Transition to po prednisone  Started on Dysphagia diet 3 with thin liquids per speech and swallow eval    Continue to monitor renal function, consider pulmonology consult  Continue with aspiration precaution  Overall poor prognosis  Pt  Does not want any agressive and heroic Mesures  DC home with hospice are with comfort care per pt and family request    Palliative care input appreciated  CM to work on hospice set up

## 2020-07-01 NOTE — PLAN OF CARE
Problem: Prexisting or High Potential for Compromised Skin Integrity  Goal: Skin integrity is maintained or improved  Description  INTERVENTIONS:  - Identify patients at risk for skin breakdown  - Assess and monitor skin integrity  - Assess and monitor nutrition and hydration status  - Monitor labs   - Assess for incontinence   - Turn and reposition patient  - Assist with mobility/ambulation  - Relieve pressure over bony prominences  - Avoid friction and shearing  - Provide appropriate hygiene as needed including keeping skin clean and dry  - Evaluate need for skin moisturizer/barrier cream  - Collaborate with interdisciplinary team   - Patient/family teaching  - Consider wound care consult   Outcome: Progressing     Problem: Potential for Falls  Goal: Patient will remain free of falls  Description  INTERVENTIONS:  - Assess patient frequently for physical needs  -  Identify cognitive and physical deficits and behaviors that affect risk of falls    -  [place] fall precautions as indicated by assessment   - Educate patient/family on patient safety including physical limitations  - Instruct patient to call for assistance with activity based on assessment  - Modify environment to reduce risk of injury  - Consider OT/PT consult to assist with strengthening/mobility  Outcome: Progressing     Problem: PAIN - ADULT  Goal: Verbalizes/displays adequate comfort level or baseline comfort level  Description  Interventions:  - Encourage patient to monitor pain and request assistance  - Assess pain using appropriate pain scale  - Administer analgesics based on type and severity of pain and evaluate response  - Implement non-pharmacological measures as appropriate and evaluate response  - Consider cultural and social influences on pain and pain management  - Notify physician/advanced practitioner if interventions unsuccessful or patient reports new pain  Outcome: Progressing     Problem: SAFETY ADULT  Goal: Maintain or return to baseline ADL function  Description  INTERVENTIONS:  -  Assess patient's ability to carry out ADLs; assess patient's baseline for ADL function and identify physical deficits which impact ability to perform ADLs (bathing, care of mouth/teeth, toileting, grooming, dressing, etc )  - Assess/evaluate cause of self-care deficits   - Assess range of motion  - Assess patient's mobility; develop plan if impaired  - Assess patient's need for assistive devices and provide as appropriate  - Encourage maximum independence but intervene and supervise when necessary  - Involve family in performance of ADLs  - Assess for home care needs following discharge   - Consider OT consult to assist with ADL evaluation and planning for discharge  - Provide patient education as appropriate  Outcome: Progressing  Goal: Maintain or return mobility status to optimal level  Description  INTERVENTIONS:  - Assess patient's baseline mobility status (ambulation, transfers, stairs, etc )    - Identify cognitive and physical deficits and behaviors that affect mobility  - Identify mobility aids required to assist with transfers and/or ambulation (gait belt, sit-to-stand, lift, walker, cane, etc )  - [place] fall precautions as indicated by assessment  - Record patient progress and toleration of activity level on Mobility SBAR; progress patient to next Phase/Stage  - Instruct patient to call for assistance with activity based on assessment  - Consider rehabilitation consult to assist with strengthening/weightbearing, etc   Outcome: Progressing     Problem: DISCHARGE PLANNING  Goal: Discharge to home or other facility with appropriate resources  Description  INTERVENTIONS:  - Identify barriers to discharge w/patient and caregiver  - Arrange for needed discharge resources and transportation as appropriate  - Identify discharge learning needs (meds, wound care, etc )  - Arrange for interpretive services to assist at discharge as needed  - Refer to Case Management Department for coordinating discharge planning if the patient needs post-hospital services based on physician/advanced practitioner order or complex needs related to functional status, cognitive ability, or social support system  Outcome: Progressing     Problem: Knowledge Deficit  Goal: Patient/family/caregiver demonstrates understanding of disease process, treatment plan, medications, and discharge instructions  Description  Complete learning assessment and assess knowledge base  Interventions:  - Provide teaching at level of understanding  - Provide teaching via preferred learning methods  Outcome: Progressing     Problem: Nutrition/Hydration-ADULT  Goal: Nutrient/Hydration intake appropriate for improving, restoring or maintaining nutritional needs  Description  Monitor and assess patient's nutrition/hydration status for malnutrition  Collaborate with interdisciplinary team and initiate plan and interventions as ordered  Monitor patient's weight and dietary intake as ordered or per policy  Utilize nutrition screening tool and intervene as necessary  Determine patient's food preferences and provide high-protein, high-caloric foods as appropriate       INTERVENTIONS:  - Monitor oral intake, urinary output, labs, and treatment plans  - Assess nutrition and hydration status and recommend course of action  - Evaluate amount of meals eaten  - Assist patient with eating if necessary   - Allow adequate time for meals  - Recommend/ encourage appropriate diets, oral nutritional supplements, and vitamin/mineral supplements  - Order, calculate, and assess calorie counts as needed  - Recommend, monitor, and adjust tube feedings based on assessed needs  - Assess need for intravenous fluids  - Provide  nutrition/hydration education as appropriate  - Include patient/family/caregiver in decisions related to nutrition   Outcome: Progressing     Problem: GENITOURINARY - ADULT  Goal: Maintains or returns to baseline urinary function  Description  INTERVENTIONS:  - Assess urinary function  - Encourage oral fluids to ensure adequate hydration if ordered  - Administer IV fluids as ordered to ensure adequate hydration  - Administer ordered medications as needed  - Offer frequent toileting  - Follow urinary retention protocol if ordered  Outcome: Progressing  Goal: Urinary catheter remains patent  Description  INTERVENTIONS:  - Assess patency of urinary catheter  - If patient has a chronic lentz, consider changing catheter if non-functioning  - Follow guidelines for intermittent irrigation of non-functioning urinary catheter  Outcome: Progressing

## 2020-07-01 NOTE — SOCIAL WORK
VISHAL spoke with Kimberley King and there is a home hospice rec for the pt  VISHAL spoke with the pt r-Mike mendozafausto and she states that the family has chosen [address] since all of her medical records are with [facility] CM asked if there was any other preference if [facility] was at capacity and she states that the family has been working with [address] [place] [address] and they are very pleased with the services and have been working with them for the past year  Nicki also inquired about a list of private care nurses because she was not aware of all the services which would be available for home hospice  She states that her father has hospice in a facility  CM explained that she would put in the referral and someone from hospice would be in touch with the family and she would be able to inquire about the services which will be available in order to determine what private pay nurse services would be needed  CM referring the pt to [facility]

## 2020-07-01 NOTE — ASSESSMENT & PLAN NOTE
Patient received CT with contrast earlier as well as Lasix  Now noted with elevated BUN and creatinine  29/1 41  Now cr 1 38  Monitor on renal function with a p o  Intake  Will consider nephrology consult if not improving or worsening

## 2020-07-01 NOTE — SOCIAL WORK
Abdon was informed by [place] at [facility] that they would be able to accept the pt in the area, but they would not be able to get to the pt at home before Saturday  CM explained that the pt is ready to dc and the goal was to have everything in place since this is a holiday weekend  CM asked the pt dtr-n-law if they were agreeable to having the pt return to the home with  hospice coming out to see the pt on Saturday and the Derryl [place] states that would not be acceptable  She states that the pt can be referred to [facility]  The CM referred the pt to [facility] and will f/u with Derryl [place] the pt dtr-n-law  She also states that her cell phone continues to drop call and she would like the CM to call her House phone number [phone] if there are any issues contacting her

## 2020-07-01 NOTE — ASSESSMENT & PLAN NOTE
No signs of active overt bleeding noted  Hemodynamically stable  Currently hemoglobin stable around 7-8 range  Now on comfort care

## 2020-07-01 NOTE — PROGRESS NOTES
Progress Note - Pulmonary   Jose Edwards 80 y o  female MRN: [id number]  Unit/Bed#: -01 Encounter: [id number]    Assessment:  Acute hypoxic respiratory failure  Abnormal chest imaging with ground-glass opacities  History of breast and ovarian cancer  Factor V Leiden  History of DVT    Plan:  Patient currently saturating well on 6 L  CTA negative for PE  Few peripheral areas of ground-glass opacity, new since 05/20 representing likely infectious or inflammatory etiology  Enlarging prevascular nodes, questionably metastatic  There is also a small left pleural effusion  Chest x-ray 6/29 showing some worsening of the coarse lung markings   COVID 19 negative x2, she is afebrile  Procalcitonin is slightly increased today  She currently on IV Solu-Medrol 40 mg q 12 hours  Wean as able  She was being diuresed however Lasix currently being held in light of worsening BUN and creatinine  Suspicion for aspiration  Oncology following  Patient would not be a good candidate for chemo  Discussed with primary team   Patient is going to be discharged on home hospice  Will sign off       Subjective:   Chief complaint this AM is double vision  Discussed this primary team  She states the breathing is   "not too bad" today  Denies cough  12 point review of systems otherwise negative  Objective:     Vitals: Blood pressure 143/60, pulse 72, temperature 97 8 °F (36 6 °C), temperature source Oral, resp  rate 18, height 5' (1 524 m), weight 61 kg (134 lb 7 7 oz), SpO2 (!) 89 %  ,Body mass index is 26 26 kg/m²  Intake/Output Summary (Last 24 hours) at 7/1/2020 1120  Last data filed at 7/1/2020 0900  Gross per 24 hour   Intake 60 ml   Output 350 ml   Net -290 ml       Invasive Devices     Peripheral Intravenous Line            Peripheral IV 06/25/20 Dorsal (posterior); Right Hand 5 days          Drain            Urethral Catheter Non-latex 18 Fr   13 days                Physical Exam:  General appearance: alert, in no acute distress  Head: Normocephalic, without obvious abnormality, atraumatic  Eyes: EOMI  No discharge bilaterally  No scleral icterus  Neck: supple, symmetrical, trachea midline  Lungs:  Decreased breath sounds  Heart: regular rate and rhythm, S1, S2 normal, no murmur, click, rub or gallop  Abdomen:  No appreciable distension or tenderness  Extremities: No edema or tenderness  Skin: No lesions or pallor noted  No rash  Neurologic: Grossly normal     Labs: I have personally reviewed pertinent lab results    Imaging and other studies: I have personally reviewed pertinent films in PACS

## 2020-07-01 NOTE — PROGRESS NOTES
Progress Note - Madhu Oconnell 9/20/1930, 80 y o  female MRN: [id number]    Unit/Bed#: -01 Encounter: [id number]    Primary Care Provider: Alisia Maldonado DO   Date and time admitted to hospital: 6/22/2020 10:22 AM        * Urinary tract infection associated with indwelling urethral catheter [facility])  Assessment & Plan  Patient has a history of a neurogenic bladder with an indwelling Smith catheter, was scheduled for urology follow-up outpatient 7/24  She has a few day history of cloudy urine, generalized weakness, and some episodes a hematuria last week  - UA showed innumerable bacteria  - creatinine 1 17, history of CKD 3  - CT of the abdomen showed hydroureternephrosis, with some bladder wall thickening and need for follow-up cystoscopy to rule out bladder cancer  Family wanted to forego evaluation as far as that goes secondary to needing general anesthesia  Completed IV cefepime tx  Remained afebrile and stable off of abx  Plan is to go home on comfort care/ hospice care with compassionate care  Family agreeable with above plan  Acute respiratory failure with hypoxemia [facility])  Assessment & Plan  Patient has been requiring oxygen here  They did bump her up to 5 L  Patient is still on 5 L right now  I think this could be volume overload  I did get a CT scan of the chest yesterday which showed some opacities  COVID-19 test negative x2    Possibly due to aspiration pneumonitis  Today clinically patient appears comfortable not in distress  O2 saturation improved to 97% on non-rebreather  Repeat chest x-ray shows course lung findings noted worsening from previous study: hypoinflation vs congestion  Echo report noted with EF of 60% with grade 1 diastolic dysfunction  Clinically does not appear to be overtly fluid overloaded  procalcitonin slightly elevated  Hold Lasix now in the settings of elevated BUN creatinine  Transition to po prednisone    Started on Dysphagia diet 3 with thin liquids per speech and swallow eval    Continue to monitor renal function, consider pulmonology consult  Continue with aspiration precaution  Overall poor prognosis  Pt  Does not want any agressive and heroic Mesures  DC home with hospice are with comfort care per pt and family request    Palliative care input appreciated  CM to work on hospice set up  Hydroureteronephrosis  Assessment & Plan  Patient does not want any aggressive surgical intervention  I had discussion with patient's daughter-in-law at length today and plan is to follow-up with palliative care consult tomorrow for goals of care discussion  Anemia  Assessment & Plan  No signs of active overt bleeding noted  Hemodynamically stable  Currently hemoglobin stable around 7-8 range  Now on comfort care  History of DVT (deep vein thrombosis)  Assessment & Plan  I have restarted the patient's Coumadin today  Patient has a history of factor 5 Leiden  INR is now therapeutic  Will put the patient on her 4 mg daily as she normally takes  Generalized weakness  Assessment & Plan  DC home with hospice are with comfort care per pt and family request      Benign hypertension with chronic kidney disease, stage III [facility])  Assessment & Plan  Patient received CT with contrast earlier as well as Lasix  Now noted with elevated BUN and creatinine  29/1 41  Now cr 1 38  Monitor on renal function with a p o  Intake  Will consider nephrology consult if not improving or worsening  VTE Pharmacologic Prophylaxis:   Pharmacologic: on comfort care    Patient Centered Rounds: I have performed bedside rounds with nursing staff today  Education and Discussions with Family / Patient: spoke with Daughter in law Marnie Remy over the phone at length  Time Spent for Care: 30 minutes  More than 50% of total time spent on counseling and coordination of care as described above      Current Length of Stay: 9 day(s)    Current Patient Status: Inpatient Certification Statement: The patient will continue to require additional inpatient hospital stay due to [address] working on home hospice    Discharge Plan: hopefully in 24-48 hrs    Code Status: Level 3 - DNAR and DNI      Subjective:   Afebrile, hemodynamically stale  O2 sat 90% on 5-6L NC  Appears comfortable, not in distress  Denies cp/sob/fever/cought/abdominal pain/n/v or diarrhea  No other events reported  Objective:     Vitals:   Temp (24hrs), Av 8 °F (36 6 °C), Min:97 8 °F (36 6 °C), Max:97 8 °F (36 6 °C)    Temp:  [97 8 °F (36 6 °C)] 97 8 °F (36 6 °C)  HR:  [72] 72  Resp:  [17-18] 18  BP: (139-143)/(60-90) 143/60  SpO2:  [89 %-95 %] 89 %  Body mass index is 26 26 kg/m²  Input and Output Summary (last 24 hours): Intake/Output Summary (Last 24 hours) at 2020 1216  Last data filed at 2020 0900  Gross per 24 hour   Intake 60 ml   Output 350 ml   Net -290 ml       Physical Exam:     Physical Exam   Constitutional: No distress  Chronically ill-appearing, not in acute distress  HENT:   Head: Normocephalic and atraumatic  Eyes: Pupils are equal, round, and reactive to light  EOM are normal    Neck: Normal range of motion  Neck supple  No JVD present  Cardiovascular: Normal rate and regular rhythm  Pulmonary/Chest: Effort normal and breath sounds normal  No stridor  No respiratory distress  Abdominal: Soft  Bowel sounds are normal  She exhibits no distension  There is no tenderness  Musculoskeletal: Normal range of motion  Neurological:   Patient is awake, alert, oriented to person, not oriented to place or time  Currently cooperative during exam   Poor historian  Skin: She is not diaphoretic     Psychiatric: Her behavior is normal        Additional Data:     Labs:    Results from last 7 days   Lab Units 20  0608   WBC Thousand/uL 11 63*   HEMOGLOBIN g/dL 8 0*   HEMATOCRIT % 25 6*   PLATELETS Thousands/uL 104*   NEUTROS PCT % 89*   LYMPHS PCT % 7*   MONOS PCT % 3*   EOS PCT % 0     Results from last 7 days   Lab Units 07/01/20  0608 06/30/20  0439   SODIUM mmol/L 136 136   POTASSIUM mmol/L 4 2 4 0   CHLORIDE mmol/L 101 101   CO2 mmol/L 26 28   BUN mg/dL 39* 30*   CREATININE mg/dL 1 38* 1 51*   ANION GAP mmol/L 9 7   CALCIUM mg/dL 8 6 8 5   ALBUMIN g/dL  --  2 0*   TOTAL BILIRUBIN mg/dL  --  0 40   ALK PHOS U/L  --  185*   ALT U/L  --  24   AST U/L  --  27   GLUCOSE RANDOM mg/dL 145* 163*     Results from last 7 days   Lab Units 07/01/20  0615   INR  4 15*     Results from last 7 days   Lab Units 06/27/20  2123   POC GLUCOSE mg/dl 164*         Results from last 7 days   Lab Units 06/30/20  1500   PROCALCITONIN ng/ml 0 26*           * I Have Reviewed All Lab Data Listed Above  * Additional Pertinent Lab Tests Reviewed: All Labs Within Last 24 Hours Reviewed        Recent Cultures (last 7 days):           Last 24 Hours Medication List:     Current Facility-Administered Medications:  acetaminophen 650 mg Oral Q6H PRN Ingris Valdes,    albuterol 2 puff Inhalation Q4H PRN Ingris Valdes,    ferrous sulfate 325 mg Oral Daily With Breakfast Celia Piña DO   fluticasone 1 spray Nasal Daily PRN Ingris Valdes DO   pantoprazole 20 mg Oral Early Morning Celia Piña DO   polyethylene glycol 17 g Oral Daily PRN Ingris Valdes DO   [START ON 7/2/2020] predniSONE 40 mg Oral Daily Masoud BLAIR MD   sodium chloride 1 spray Each Nare Q1H PRN Eb Oh MD   sodium chloride 2 g Oral TID With Meals Ingris Valdes DO        Today, Patient Was Seen By: Janelle Romero MD    ** Please Note: Dictation voice to text software may have been used in the creation of this document   **

## 2020-07-01 NOTE — SOCIAL WORK
VISHAL spoke with Luis Barcenas from compassionate [facility] and the pt has been accepted for their home hospice  She states that everything has to be set up and the transport should be set up for noon tomorrow

## 2020-07-01 NOTE — CONSULTS
Assessment/Plan:    Patient is very pleasant 51-year-old female with a history of breast and metastatic ovarian cancer treated and in remission for some significant period of time  The patient now has a 2 5 cm lymphatic enlargement in the left pelvis which is asymptomatic however it is causing ureteral obstruction and elevation of creatinine  The patient's  is elevated over 100 however her BNP is markedly elevating it indicating some degree of heart failure which will raise the   It is unclear whether this process represents a malignancy although it most likely does  It is certainly unclear as to which primary this is  It could be breast ovary or a new primary in the bladder or lymphoma     The patient per my discussion today does not desire any further treatment and per review of the chart and family meeting yesterday has elected not to go forward with any treatment  I think this is the best idea given the patient's frailty, lack of symptoms, and likely inability to tolerate treatment  If the patient were to consider further evaluation a CT-guided biopsy of the lymph node and cystoscopy would likely be the 1st place to start  This would be to diagnose a possibility of recurrent malignancy  As a proceeding toward hospice, which I agree is the best avenue for this patient, no further therapy is warranted  CHIEF COMPLAINT:  Pelvic mass, history of ovarian and breast cancer        Patient ID: Venson Boeck is a 80 y o  female  Patient is very pleasant 51-year-old female with history of stage IIIC ovarian carcinoma diagnosed and treated in 2005  She underwent debulking followed by 6 cycles of carboplatin paclitaxel therapy  She has been without disease since that time  The patient has also been treated for breast cancer  The patient is currently admitted with failure to thrive weight loss and general malaise      A CT scan of the abdomen and pelvis revealed an enlarging 2 4 lymph node mass with partial obstruction of the ureter:  ABDOMINOPELVIC CAVITY:    No pneumoperitoneum      There is a tiny amount of free fluid in the pelvis      Limited evaluation of adenopathy without IV contrast  Question enlarged 2 4 x 1 3 cm garrick hepatis lymph node series 2/62  Volume averaging with tortuous portal vein cannot be completely excluded      2 1 x 2 0 cm left pelvic sidewall lymph node series 2/93, previously measured about 8 x 8 mm on the July 2015 exam  Additional bilateral external iliac chain adenopathy suggested, again suboptimally evaluated without IV contrast      Left para-aortic adenopathy suspected measuring about 9 mm short axis series 2/74, increased from previous study      VESSELS:  Unremarkable for patient's age      PELVIS     REPRODUCTIVE ORGANS:  Status post hysterectomy      URINARY BLADDER:  The urinary bladder is partially collapsed by Smith catheter with intraluminal air and diffuse wall thickening, more pronounced posteriorly and on the right compared to the left  Underlying neoplasm cannot be excluded       Extensive bladder diverticula were noted on previous study, probably present on the current exam along the upper posterior bladder dome such as series 3 image 93 but better visualized on sagittal imaging      ABDOMINAL WALL/INGUINAL REGIONS:  Subcutaneous edema seen along the midline subcutaneous back fat  Ventral abdominal wall scarring      OSSEOUS STRUCTURES:  No acute fracture or destructive osseous lesion  Degenerative changes of the spine and pubic symphysis  Grade 1-2 anterolisthesis L5 on S1, likely related to L5 pars defects      Healing fractures of the left anterolateral third through sixth ribs      IMPRESSION:     1  Moderate to severe right hydroureteronephrosis with  high attenuation seen within the collecting system, possibly related to hemorrhage or abnormal excretion of contrast from the right kidney related to previous CT study about 1 week ago   There is abrupt nonvisualization of the right ureter at the UVJ with associated soft tissue fullness and bladder wall thickening  Correlate with cystoscopy regarding possible bladder neoplasm  Additionally, there is obscuration of the soft tissue planes between   the vaginal cuff and posterior right bladder wall (best seen on series 2 image 102)  The possibility of potential neoplastic involvement of this region cannot be excluded      2  Enlarged left pelvic sidewall lymph node with possible enlarging lymph nodes in the garrick hepatis and left retroperitoneum, concerning for metastases  Evaluation of adenopathy is significantly limited without IV contrast      3  No CT evidence of pneumonia  Mild bibasilar atelectasis and scattered subpleural scarring      4  Trace left pleural effusion, unchanged in retrospect      5  Stable mild mediastinal adenopathy      6  Additionally subtle findings including fatty liver, possible gallbladder sludge, colonic diverticulosis and other additional findings as detailed above  In discussion today the patient is somewhat disaffected  She is frail-appearing and slow to mentate  She is breathing comfortably  She has no complaints of pain or any other physical ailments  She expresses no further desire to continue on with treatment including chemotherapy  Review of Systems   Constitutional: Negative  HENT: Negative  Eyes: Negative  Respiratory: Negative  Cardiovascular: Negative  Gastrointestinal: Negative  Endocrine: Negative  Genitourinary: Negative  Musculoskeletal: Negative  Skin: Negative  Neurological: Negative  Hematological: Negative  Psychiatric/Behavioral: Negative          Current Facility-Administered Medications   Medication Dose Route Frequency Provider Last Rate Last Dose    acetaminophen (TYLENOL) tablet 650 mg  650 mg Oral Q6H PRN Sofia Amaya DO   650 mg at 06/28/20 9943    albuterol (PROVENTIL HFA,VENTOLIN HFA) inhaler 2 puff  2 puff Inhalation Q4H PRN Lysle Grad, DO        ferrous sulfate tablet 325 mg  325 mg Oral Daily With Breakfast Celia Ayana, DO   325 mg at 06/30/20 0912    fluticasone (FLONASE) 50 mcg/act nasal spray 1 spray  1 spray Nasal Daily PRN Lysle Grad, DO   1 spray at 06/27/20 0331    methylPREDNISolone sodium succinate (Solu-MEDROL) injection 40 mg  40 mg Intravenous Q12H [address] Masoud BLAIR MD   40 mg at 06/30/20 2114    pantoprazole (PROTONIX) EC tablet 20 mg  20 mg Oral Early Morning Celia Ayana, DO   20 mg at 06/30/20 8787    polyethylene glycol (MIRALAX) packet 17 g  17 g Oral Daily PRN Lysle Grad, DO        sodium chloride (OCEAN) 0 65 % nasal spray 1 spray  1 spray Each Nare Q1H PRN Suzy Li MD        sodium chloride tablet 2 g  2 g Oral TID With Meals Lysle Grad, DO   2 g at 06/30/20 0912       Allergies   Allergen Reactions    Amoxicillin      Pt does not remember     Ciprofloxacin     Penicillins        Past Medical History:   Diagnosis Date    Cancer ([facility] )     BREAST , OVARIAN    Factor 5 Leiden mutation, heterozygous ([facility] )     Hypertension        Past Surgical History:   Procedure Laterality Date    BREAST SURGERY      HYSTERECTOMY         OB History    None         History reviewed  No pertinent family history  The following portions of the patient's history were reviewed and updated as appropriate: allergies, current medications, past family history, past medical history, past social history, past surgical history and problem list       Objective:    Blood pressure 143/60, pulse 72, temperature 97 8 °F (36 6 °C), temperature source Oral, resp  rate 18, height 5' (1 524 m), weight 61 kg (134 lb 7 7 oz), SpO2 (!) 89 %  Body mass index is 26 26 kg/m²  Physical Exam   Constitutional: She appears well-developed and well-nourished  HENT:   Head: Normocephalic and atraumatic  Eyes: EOM are normal    Neck: Normal range of motion  Neck supple  No thyromegaly present  Cardiovascular: Normal rate, regular rhythm and normal heart sounds  Pulmonary/Chest: Effort normal and breath sounds normal    Abdominal: Soft  Bowel sounds are normal    Well healed midline incisions  Genitourinary:   Genitourinary Comments: Deferred   Musculoskeletal: Normal range of motion  Lymphadenopathy:     She has no cervical adenopathy  Neurological:   The patient's mentation is slowed  Although she is oriented  Skin: Skin is warm and dry  Psychiatric: She has a normal mood and affect   Her behavior is normal          Lab Results   Component Value Date     175 6 (H) 06/26/2020     Lab Results   Component Value Date    WBC 11 63 (H) 07/01/2020    HGB 8 0 (L) 07/01/2020    HCT 25 6 (L) 07/01/2020    MCV 96 07/01/2020     (L) 07/01/2020     Lab Results   Component Value Date    K 4 2 07/01/2020     07/01/2020    CO2 26 07/01/2020    BUN 39 (H) 07/01/2020    CREATININE 1 38 (H) 07/01/2020    CALCIUM 8 6 07/01/2020    AST 27 06/30/2020    ALT 24 06/30/2020    ALKPHOS 185 (H) 06/30/2020    EGFR 34 07/01/2020        Trend:  Lab Results   Component Value Date     175 6 (H) 06/26/2020

## 2020-07-01 NOTE — PROGRESS NOTES
Progress note - Palliative and Supportive Care   Mendez Francois 80 y o  female [id number]    Assessment:      Patient Active Problem List   Diagnosis    Mixed dyslipidemia    Benign hypertension with chronic kidney disease, stage III ([facility] )    CKD (chronic kidney disease) stage 3, GFR 30-59 ml/min ([facility])    UTI (urinary tract infection)    Hypoxia    Generalized weakness    Neurogenic bladder    History of DVT (deep vein thrombosis)    Rib fractures    Anemia    Subtherapeutic international normalized ratio (INR)    RAINE (acute kidney injury) ([facility] )    Hyponatremia    Hyperkalemia    Hematuria    Urinary tract infection associated with indwelling urethral catheter ([facility])    Hydroureteronephrosis    Acute respiratory failure with hypoxemia ([facility] )         Plan:  1  Symptom management -     Recommend global delirium precautions including:      - Establishment of day/night cycle via lights during the day and blinds open  Please limit interruptions at night as medically appropriate  - Provide glasses/hearing aids as apprioriate  - Minimize deliriogenic meds as able  - Provide reorientation including date on board and visible clock  - Avoid restraints as able, frequent verbal reorientations or patient care sitter as appropriate  2  Goals - Confirmed with adam Nath and patient, would like to be discharged home with hospice  I will provide comfort pack  Code Status: level 3      Interval history:       Patient states she is having pain in her tongue  She is not sure if she bit it  Denies any shortness of breath  Reports double vision - does not wish to investigate  Feels tired  Feels weak  Appetite is poor, but is thirsty  Talked to patient and aadm Nath  Discussed comfort care plan and hospice planning           MEDICATIONS / ALLERGIES:     all current active meds have been reviewed and current meds:   Current Facility-Administered Medications   Medication Dose Route Frequency    acetaminophen (TYLENOL) tablet 650 mg  650 mg Oral Q6H PRN    albuterol (PROVENTIL HFA,VENTOLIN HFA) inhaler 2 puff  2 puff Inhalation Q4H PRN    ferrous sulfate tablet 325 mg  325 mg Oral Daily With Breakfast    fluticasone (FLONASE) 50 mcg/act nasal spray 1 spray  1 spray Nasal Daily PRN    pantoprazole (PROTONIX) EC tablet 20 mg  20 mg Oral Early Morning    polyethylene glycol (MIRALAX) packet 17 g  17 g Oral Daily PRN    [START ON 7/2/2020] predniSONE tablet 40 mg  40 mg Oral Daily    sodium chloride (OCEAN) 0 65 % nasal spray 1 spray  1 spray Each Nare Q1H PRN    sodium chloride tablet 2 g  2 g Oral TID With Meals       Allergies   Allergen Reactions    Amoxicillin      Pt does not remember     Ciprofloxacin     Penicillins        OBJECTIVE:    Physical Exam  Physical Exam   Constitutional: No distress  HENT:   Head: Atraumatic  Right Ear: External ear normal    Left Ear: External ear normal    Nose: Nose normal    Tongue with small areas of inflammation consistent with bites   Eyes: Right eye exhibits no discharge  Left eye exhibits no discharge  Cardiovascular: Normal rate  Pulmonary/Chest: No respiratory distress  Abdominal: Soft  She exhibits no distension  Musculoskeletal: She exhibits edema and deformity  Neurological: She is alert  Skin: Skin is dry  She is not diaphoretic  No erythema  Psychiatric: She has a normal mood and affect  Her behavior is normal    Nursing note and vitals reviewed  Lab Results:   I have personally reviewed pertinent labs  , CBC:   Lab Results   Component Value Date    WBC 11 63 (H) 07/01/2020    HGB 8 0 (L) 07/01/2020    HCT 25 6 (L) 07/01/2020    MCV 96 07/01/2020     (L) 07/01/2020    MCH 30 0 07/01/2020    MCHC 31 3 (L) 07/01/2020    RDW 15 8 (H) 07/01/2020    MPV 11 9 07/01/2020    NRBC 0 07/01/2020   , CMP:   Lab Results   Component Value Date    SODIUM 136 07/01/2020    K 4 2 07/01/2020     07/01/2020    CO2 26 07/01/2020 BUN 39 (H) 07/01/2020    CREATININE 1 38 (H) 07/01/2020    CALCIUM 8 6 07/01/2020    EGFR 34 07/01/2020   , BMP:  Lab Results   Component Value Date    SODIUM 136 07/01/2020    K 4 2 07/01/2020     07/01/2020    CO2 26 07/01/2020    BUN 39 (H) 07/01/2020    CREATININE 1 38 (H) 07/01/2020    GLUC 145 (H) 07/01/2020    CALCIUM 8 6 07/01/2020    AGAP 9 07/01/2020    EGFR 34 07/01/2020         Counseling / Coordination of Care    Total floor / unit time spent today 20+ minutes  Greater than 50% of total time was spent with the patient and / or family counseling and / or coordination of care   A description of the counseling / coordination of care: patient assessment, symptom assessment, med review, confirmation fo goals of care, hospice d/c planning

## 2020-07-01 NOTE — SOCIAL WORK
CM reached out to [facility] to discuss if they would be able to service the family because they reside in [place]  CM has been advised to speak with Amanda Bell to discuss the pt  The referral has been submitted in [employer]  VISHAL awaiting contact info for Amanda Bell to follow up

## 2020-07-01 NOTE — ASSESSMENT & PLAN NOTE
Patient has a history of a neurogenic bladder with an indwelling Smith catheter, was scheduled for urology follow-up outpatient 7/24  She has a few day history of cloudy urine, generalized weakness, and some episodes a hematuria last week  - UA showed innumerable bacteria  - creatinine 1 17, history of CKD 3  - CT of the abdomen showed hydroureternephrosis, with some bladder wall thickening and need for follow-up cystoscopy to rule out bladder cancer  Family wanted to forego evaluation as far as that goes secondary to needing general anesthesia  Completed IV cefepime tx  Remained afebrile and stable off of abx  Plan is to go home on comfort care/ hospice care with compassionate care  Family agreeable with above plan

## 2020-07-01 NOTE — SPEECH THERAPY NOTE
Speech-Language Pathology Bedside Swallow Evaluation      Patient Name: Amairani Lazo    Today's Date: 7/1/2020     Problem List  Principal Problem:    Urinary tract infection associated with indwelling urethral catheter ([facility] )  Active Problems:    Benign hypertension with chronic kidney disease, stage III (HCC)    Generalized weakness    History of DVT (deep vein thrombosis)    Anemia    Hydroureteronephrosis    Acute respiratory failure with hypoxemia [facility])      Past Medical History  Past Medical History:   Diagnosis Date    Cancer ([place] [address] )     BREAST , OVARIAN    Factor 5 Leiden mutation, heterozygous ([place] [address] )     Hypertension        Past Surgical History  Past Surgical History:   Procedure Laterality Date    BREAST SURGERY      HYSTERECTOMY         Summary   Pt presented with functional appearing oral and pharyngeal stage swallowing skills with materials administered today (applesauce, pack of aniket crackers, 4ozs nectar and 4 oxs of thin liquid by individual sips)  Note: pt was repositioned (reclined s/p evaluation and began to cough, suspicious for reflux)  Recommended Diet: regular diet and thin liquids   Recommended Form of Meds: as desired and tolerated   Aspiration precautions and swallowing strategies:  -upright posture, only feed when fully alert, slow rate of feeding  - individual sips of liquids (so as to not further challenge respiratory system)        Current Medical Status  Amairani Lazo is a 81 yo F c PMH including breast cancer, ovarian cancer, Factor V Leiden, DVT, neurogenic bladder with chronic indwelling Smith who was recently hospitalized from 06/08 to 6/17 with weakness and UTI symptoms  She was readmitted 06/22 with similar symptoms  She did not have any respiratory symptoms at the time  Around 6/26, she began requiring supplemental oxygen by nasal cannula  On 06/27, a CTA was done which was negative for PE and showed ground-glass opacities    A chest x-ray was done yesterday on 06/29 showing increased course marking possibly secondary to hypoinflation or congestion  L pleural effusion also seen  She was being diuresed however this was stopped due to worsening BUN and Cr  She is currently being followed by Oncology, there is left pelvic sidewall lymphadenopathy and bladder wall thickening which is concerning for malignancy  On 6/30, pt with s/s concerning for aspiration and SLP Swallowing Evaluation ordered  Current Precautions:  Fall     Past medical history:  Please see H&P for details    Swallow Information   Current Risks for Dysphagia & Aspiration: respiratory challenge  Current Symptoms/Concerns: ?aspiration event  Current Diet: NPO   Baseline Diet: regular diet and thin liquids      Baseline Assessment   Behavior/Cognition: alert  Speech/Language Status: able to participate in basic conversation and able to follow commands  Patient Positioning: upright in bed  Pain Status/Interventions/Response to Interventions:  No report of or nonverbal indications of pain  Swallow Mechanism Exam  Facial: symmetrical  Labial: WFL  Lingual: WFL  Velum: symmetrical  Mandible: adequate ROM  Dentition: dentures  Vocal quality:clear/adequate   Volitional Cough: weak   Respiratory Status: on O2 viaNC    Consistencies Assessed and Performance   Consistencies Administered: thin liquids, nectar thick, puree, mechanical soft solids and soft solids  Materials administered included 4ozs nectar and 4 ozs thin juice, serving of applesauce and aniket crackers    Oral Stage: minimal impairment 2* generalized weakness  Mastication was mildly prolonged but adequate with the materials administered today  Bolus formation and transfer were functional with no significant oral residue noted  No overt s/s reduced oral control  Pharyngeal Stage: WFL c soft food and individual sips of liquid (risk with rapid rate of intake with liquids)  Swallow Mechanics:  Swallowing initiation appeared prompt    Laryngeal rise was palpated and judged to be within functional limits  No coughing, throat clearing, change in vocal quality or respiratory status noted today with soft food or individual sips of liquid (+bleching and coughing with successive sips)    Esophageal Concerns: belching    Strategies and Efficacy: slow rate eliminated sxs today    Summary and Recommendations (see above)    Results Reviewed with: patient and RN     Treatment Recommended: f/u x1 to ensure maximal safety of least restrictive diet    Patient Stated Goal:  "I'd like a drink    And a bowl of oatmeal"    Dysphagia LTG per SLP  -Patient will demonstrate optimum safety and efficacy of oral intake and swallowing function without overt s/sx of penetration or aspiration for the highest appropriate diet level x24-48 hrs

## 2020-07-01 NOTE — SOCIAL WORK
VISHAL spoke with the pt [phone] and updated her that the pt would be dc'ing on home hospice  She asked that the CM call her back with the name of the hospice agency    [phone]    CM will keep her updated

## 2020-07-01 NOTE — WOUND OSTOMY CARE
Progress Note - Wound   Eleazar Mcconnell 80 y o  female MRN: [id number]  Unit/Bed#: -01 Encounter: [id number]      Assessment:  Wound care to see patient for weekly follow-up visit  Patient seen in bed, with the primary RN  Patient is agreeable and cooperative for the assessment  Dependent for care  Smith cath in place  Incontinent of bowel- antonio-care rendered with assessment  Assist of one with turning for the assessment  Wedges in use for repositioning  Nutrition is following  accu-max noted to the mattress      B/l heels and buttocks are intact with no redness or wounds noted       Sacrum with 2 pressure injuries - POA  Wounds photographed together  There is blanchable hyperpigmented skin in between the open areas      1  Proximal sacrum with stage 2 pressure injury POA - small residual partial thickness wound  Decreased measurements noted  100% pink tissue that is blanchable  Edges fragile and attached, no maceration  Antonio-wound is intact with blanchable hyperpigmented skin and blanchable pink scarring  2  Distal sacrum with unstageable pressure injury POA  Decreased measurements noted  True depth of wound is unknown due to devitalized tissue covering the wound bed  Wound bed is approx: 80% yellow adhered tissue, 20% pink tissue surrounding the slough  Edges fragile and attached, no maceration  Antonio-wound is intact with blanchable hyperpigmented skin and pink intact blanchable scarring    -Recommend to continue with plan of care in place       No induration, fluctuance, odor, warmth, redness, or purulence noted to the above noted wounds  Patient tolerated well- no s/s of non-verbal pain noted  Primary nurse aware of plan of care  See flow sheets for more detailed assessment findings  Will follow along      Plan: 1  Cleanse b/l buttocks and sacrum with soap and water, pat dry, apply calazime cream to the sacrum and apply hydraguard to the b/l buttocks TID and PRN  2   Apply skin nourishing cream the entire skin daily for moisture  3  Turn and reposition patient every  2 hours   4  Elevate heels off of bed with pillows to offload pressure   5  Apply EHOB waffle cushion to chair when OOB, if able  6  Allevyn foam to heels, flavia w/P, peel foam check skin integrity q-shift  Change q5d   7  Wound care will follow along with patient weekly, please call with questions and concerns    Objective:    Vitals: Blood pressure 143/60, pulse 72, temperature 97 8 °F (36 6 °C), temperature source Oral, resp  rate 18, height 5' (1 524 m), weight 61 kg (134 lb 7 7 oz), SpO2 (!) 89 %  ,Body mass index is 26 26 kg/m²  Wound 06/22/20 Pressure Injury Sacrum Upper;Distal (Active)   Wound Description Pink 7/1/2020  9:29 AM   Staging Stage II 7/1/2020  9:29 AM   Jessi-wound Assessment Dry; Intact;Fragile; Hyperpigmented 7/1/2020  9:29 AM   Wound Length (cm) 0 2 cm 7/1/2020  9:29 AM   Wound Width (cm) 0 2 cm 7/1/2020  9:29 AM   Wound Depth (cm) 0 1 7/1/2020  9:29 AM   Calculated Wound Area (cm^2) 0 04 cm^2 7/1/2020  9:29 AM   Calculated Wound Volume (cm^3) 0 cm^3 7/1/2020  9:29 AM   Change in Wound Size % 100 7/1/2020  9:29 AM   Tunneling 0 cm 7/1/2020  9:29 AM   Tunneling in depth located at 0 7/1/2020  9:29 AM   Undermining 0 7/1/2020  9:29 AM   Undermining is depth extending from 0 7/1/2020  9:29 AM   Closure None 7/1/2020  9:29 AM   Drainage Amount None 7/1/2020  9:29 AM   Non-staged Wound Description Partial thickness 7/1/2020  9:29 AM   Treatments Cleansed;Irrigation with NSS;Site care;Elevated 7/1/2020  9:29 AM   Dressing Protective barrier 7/1/2020  9:29 AM   Wound packed?  No 7/1/2020  9:29 AM   Packing- # removed 0 7/1/2020  9:29 AM   Packing- # inserted 0 7/1/2020  9:29 AM   Patient Tolerance Tolerated well 7/1/2020  9:29 AM       Wound 06/24/20 Pressure Injury Sacrum Proximal (Active)   Wound Image   7/1/2020  9:29 AM   Wound Description Pink;Yellow 7/1/2020  9:29 AM   Staging Unstagable 7/1/2020  9:29 AM   Jessi-wound Assessment Dry;Intact;Fragile; Hyperpigmented 7/1/2020  9:29 AM   Wound Length (cm) 0 3 cm 7/1/2020  9:29 AM   Wound Width (cm) 0 5 cm 7/1/2020  9:29 AM   Wound Depth (cm) 0 1 7/1/2020  9:29 AM   Calculated Wound Area (cm^2) 0 15 cm^2 7/1/2020  9:29 AM   Calculated Wound Volume (cm^3) 0 02 cm^3 7/1/2020  9:29 AM   Tunneling 0 cm 7/1/2020  9:29 AM   Tunneling in depth located at 0 7/1/2020  9:29 AM   Undermining 0 7/1/2020  9:29 AM   Undermining is depth extending from 0 7/1/2020  9:29 AM   Closure None 7/1/2020  9:29 AM   Drainage Amount Scant 7/1/2020  9:29 AM   Drainage Description Serosanguineous 7/1/2020  9:29 AM   Non-staged Wound Description Partial thickness 7/1/2020  9:29 AM   Treatments Cleansed;Irrigation with NSS;Site care;Elevated 7/1/2020  9:29 AM   Dressing Protective barrier 7/1/2020  9:29 AM   Wound packed? No 7/1/2020  9:29 AM   Packing- # removed 0 7/1/2020  9:29 AM   Packing- # inserted 0 7/1/2020  9:29 AM   Patient Tolerance Tolerated well 7/1/2020  9:29 AM           Recommendations written as orders  AVS updated    Melina Barron RN BSN [facility]ON

## 2020-07-01 NOTE — SOCIAL WORK
Cm spoke with Dk Looney at [facility] to arrange BLS transport to the home  The pt is scheduled for a 12 noon BLS transport to her home via [facility]

## 2020-07-02 LAB
BASOPHILS # BLD AUTO: 0.01 THOUSANDS/ΜL (ref 0–0.1)
BASOPHILS NFR BLD AUTO: 0 % (ref 0–1)
EOSINOPHIL # BLD AUTO: 0.05 THOUSAND/ΜL (ref 0–0.61)
EOSINOPHIL NFR BLD AUTO: 0 % (ref 0–6)
ERYTHROCYTE [DISTWIDTH] IN BLOOD BY AUTOMATED COUNT: 15.9 % (ref 11.6–15.1)
HCT VFR BLD AUTO: 27.1 % (ref 34.8–46.1)
HGB BLD-MCNC: 8.4 G/DL (ref 11.5–15.4)
IMM GRANULOCYTES # BLD AUTO: 0.2 THOUSAND/UL (ref 0–0.2)
IMM GRANULOCYTES NFR BLD AUTO: 2 % (ref 0–2)
INR PPP: 3.89 (ref 0.84–1.19)
LYMPHOCYTES # BLD AUTO: 1.19 THOUSANDS/ΜL (ref 0.6–4.47)
LYMPHOCYTES NFR BLD AUTO: 10 % (ref 14–44)
MCH RBC QN AUTO: 29.8 PG (ref 26.8–34.3)
MCHC RBC AUTO-ENTMCNC: 31 G/DL (ref 31.4–37.4)
MCV RBC AUTO: 96 FL (ref 82–98)
MONOCYTES # BLD AUTO: 0.74 THOUSAND/ΜL (ref 0.17–1.22)
MONOCYTES NFR BLD AUTO: 6 % (ref 4–12)
NEUTROPHILS # BLD AUTO: 9.96 THOUSANDS/ΜL (ref 1.85–7.62)
NEUTS SEG NFR BLD AUTO: 82 % (ref 43–75)
NRBC BLD AUTO-RTO: 0 /100 WBCS
PLATELET # BLD AUTO: 81 THOUSANDS/UL (ref 149–390)
PMV BLD AUTO: 11.3 FL (ref 8.9–12.7)
PROTHROMBIN TIME: 36.4 SECONDS (ref 11.6–14.5)
RBC # BLD AUTO: 2.82 MILLION/UL (ref 3.81–5.12)
WBC # BLD AUTO: 12.15 THOUSAND/UL (ref 4.31–10.16)

## 2020-07-02 PROCEDURE — 85025 COMPLETE CBC W/AUTO DIFF WBC: CPT | Performed by: STUDENT IN AN ORGANIZED HEALTH CARE EDUCATION/TRAINING PROGRAM

## 2020-07-02 PROCEDURE — 85610 PROTHROMBIN TIME: CPT | Performed by: STUDENT IN AN ORGANIZED HEALTH CARE EDUCATION/TRAINING PROGRAM

## 2020-07-02 PROCEDURE — 99232 SBSQ HOSP IP/OBS MODERATE 35: CPT | Performed by: STUDENT IN AN ORGANIZED HEALTH CARE EDUCATION/TRAINING PROGRAM

## 2020-07-02 PROCEDURE — 97530 THERAPEUTIC ACTIVITIES: CPT

## 2020-07-02 PROCEDURE — 92526 ORAL FUNCTION THERAPY: CPT

## 2020-07-02 RX ADMIN — Medication 2 G: at 14:28

## 2020-07-02 RX ADMIN — PREDNISONE 40 MG: 20 TABLET ORAL at 09:22

## 2020-07-02 RX ADMIN — FERROUS SULFATE TAB 325 MG (65 MG ELEMENTAL FE) 325 MG: 325 (65 FE) TAB at 07:56

## 2020-07-02 RX ADMIN — Medication 2 G: at 17:31

## 2020-07-02 RX ADMIN — Medication 2 G: at 07:56

## 2020-07-02 RX ADMIN — PANTOPRAZOLE SODIUM 20 MG: 20 TABLET, DELAYED RELEASE ORAL at 06:45

## 2020-07-02 NOTE — SOCIAL WORK
VISHAL returned the [employer] phone call  VISHAL called to update her on the case    VISHAL informed her that the pt will dc with [facility] home hospice within the next 24 hours

## 2020-07-02 NOTE — TRANSPORTATION MEDICAL NECESSITY
Section I - General Information    Name of Patient: Mendez Francois                 : 1930    Medicare #: [id number]  Transport Date: 20 (PCS is valid for round trips on this date and for all repetitive trips in the 60-day range as noted below )  Origin: [address], [place], [address] [facility]  [address]  Is the pt's stay covered under Medicare Part A (PPS/DRG)   []     Closest appropriate facility? If no, why is transport to more distant facility required? Yes  If hospice pt, is this transport related to pt's terminal illness? No       Section II - Medical Necessity Questionnaire  Ambulance transportation is medically necessary only if other means of transport are contraindicated or would be potentially harmful to the patient  To meet this requirement, the patient must either be "bed confined" or suffer from a condition such that transport by means other than ambulance is contraindicated by the patient's condition  The following questions must be answered by the medical professional signing below for this form to be valid:    1)  Describe the MEDICAL CONDITION (physical and/or mental) of this patient AT [address] [place] [address] that requires the patient to be transported in an ambulance and why transport by other means is contraindicated by the patient's condition:Decreased Strength, Decreased mobility, Decreased endurance, Decreased skin intergrity    2) Is the patient "bed confined" as defined below? Yes  To be "be confined" the patient must satisfy all three of the following conditions: (1) unable to get up from bed without Assistance; AND (2) unable to ambulate; AND (3) unable to sit in a chair or wheelchair  3) Can this patient safely be transported by car or wheelchair van (i e , seated during transport without a medical attendant or monitoring)?    No    4) In addition to completing questions 1-3 above, please check any of the following conditions that apply*:   *Note: supporting documentation for any boxes checked must be maintained in the patient's medical records  If hosp-hosp transfer, describe services needed at 2nd facility not available at 1st facility? Moderate/severe pain on movement   Medical attendant required   Special handling/isolation/infection control precautions required   Unable to tolerate seated position for time needed to transport   Unable to sit in a chair or wheelchair due to decubitus ulcers or other wounds       Section III - Signature of Physician or Healthcare Professional  I certify that the above information is true and correct based on my evaluation of this patient, and represent that the patient requires transport by ambulance and that other forms of transport are contraindicated  I understand that this information will be used by the Centers for Medicare and Medicaid Services (CMS) to support the determination of medical necessity for ambulance services, and I represent that I have personal knowledge of the patient's condition at time of transport  []  If this box is checked, I also certify that the patient is physically or mentally incapable of signing the ambulance service's claim and that the institution with which I am affiliated has furnished care, services, or assistance to the patient  My signature below is made on behalf of the patient pursuant to 42 CFR §424 36(b)(4)   In accordance with 42 CFR §424 37, the specific reason(s) that the patient is physically or mentally incapable of signing the claim form is as follows: N/A      Signature of Physician* or Healthcare Professional______________________________________________________________  Signature Date 07/02/20 (For scheduled repetitive transports, this form is not valid for transports performed more than 60 days after this date)    Printed Name & Credentials of Physician or Healthcare Professional (MD, DO, RN, etc )__Payal Sharpe Hitchcock  *Form must be signed by patient's attending physician for scheduled, repetitive transports   For non-repetitive, unscheduled ambulance transports, if unable to obtain the signature of the attending physician, any of the following may sign (choose appropriate option below)  [] Physician Assistant []  Clinical Nurse Specialist []  Registered Nurse  []  Nurse Practitioner  [x] Discharge Planner

## 2020-07-02 NOTE — PLAN OF CARE
Problem: PHYSICAL THERAPY ADULT  Goal: Performs mobility at highest level of function for planned discharge setting  See evaluation for individualized goals  Description  Treatment/Interventions: Functional transfer training, LE strengthening/ROM, Elevations, Therapeutic exercise, Endurance training, Patient/family training, Equipment eval/education, Gait training, Bed mobility, Spoke to nursing, OT  Equipment Recommended: Walker(RW)       See flowsheet documentation for full assessment, interventions and recommendations  Outcome: Progressing  Note:   Prognosis: Good  Problem List: Decreased strength, Decreased endurance, Impaired balance, Decreased mobility  Assessment: Pt seen for PT treatment session this date with interventions consisting of therapeutic activity consisting of training: bed mobility, supine<>sit transfers, sit<>stand transfers, static sitting tolerance at EOB for 8 minutes w/ B/L UE support and stand pivot transfers towards Left direction  Pt agreeable to PT treatment session upon arrival, pt found supine in bed w/ HOB elevated, in no apparent distress  In comparison to previous session, pt with improvements in mobility and activity tolerance with Mod A x2  Post session: pt returned back to recliner, chair alarm engaged, all needs in reach and RN notified of session findings/recommendations Continue to recommend post acute rehabilitation at time of d/c in order to maximize pt's functional independence and safety w/ mobility  Pt continues to be functioning below baseline level, and remains limited 2* factors listed above and including strength deficits, balance deficits, decreased activity tolerance, decreased functional mobility  PT will continue to see pt while here in order to address the deficits listed above and provide interventions consistent w/ POC in effort to achieve STGs    Barriers to Discharge: Decreased caregiver support, Inaccessible home environment     PT Discharge Recommendation: Post-Acute Rehabilitation Services     PT - OK to Discharge: Yes    See flowsheet documentation for full assessment

## 2020-07-02 NOTE — PROGRESS NOTES
Pt is sitting in chair  Currently on portable pu;se Ox and sats are 90%  Pt is ULLOA and desats into the 80's when moving, boosted pt in chair  Denies pain or any needs  Will continue to monitor pt

## 2020-07-02 NOTE — PROGRESS NOTES
Progress Note - Eleazar Mcconnell 9/20/1930, 80 y o  female MRN: [id number]    Unit/Bed#: -01 Encounter: [id number]    Primary Care Provider: Olaf Crew,    Date and time admitted to hospital: 6/22/2020 10:22 AM        * Urinary tract infection associated with indwelling urethral catheter [facility])  Assessment & Plan  Patient has a history of a neurogenic bladder with an indwelling Smith catheter, was scheduled for urology follow-up outpatient 7/24  She has a few day history of cloudy urine, generalized weakness, and some episodes a hematuria last week  - UA showed innumerable bacteria  - creatinine 1 17, history of CKD 3  - CT of the abdomen showed hydroureternephrosis, with some bladder wall thickening and need for follow-up cystoscopy to rule out bladder cancer  Family wanted to forego evaluation as far as that goes secondary to needing general anesthesia  Completed IV cefepime tx  Remained afebrile and stable off of abx  Plan is to go home on comfort care/ hospice care with compassionate care  Family agreeable with above plan  Acute respiratory failure with hypoxemia [facility])  Assessment & Plan  Patient has been requiring oxygen here  They did bump her up to 5 L  Patient is still on 5 L right now  I think this could be volume overload  I did get a CT scan of the chest yesterday which showed some opacities  COVID-19 test negative x2    Possibly due to aspiration pneumonitis  Today clinically patient appears comfortable not in distress  O2 saturation improved to 97% on non-rebreather  Repeat chest x-ray shows course lung findings noted worsening from previous study: hypoinflation vs congestion  Echo report noted with EF of 60% with grade 1 diastolic dysfunction  Clinically does not appear to be overtly fluid overloaded  procalcitonin slightly elevated  Hold Lasix now in the settings of elevated BUN creatinine  Transition to po prednisone    Started on Dysphagia diet 3 with thin liquids per speech and swallow eval    Continue to monitor renal function, consider pulmonology consult  Continue with aspiration precaution  Overall poor prognosis  Pt  Does not want any agressive and heroic Mesures  DC home with hospice are with comfort care per pt and family request    Palliative care input appreciated  CM to work on hospice set up  Hydroureteronephrosis  Assessment & Plan  Patient does not want any aggressive surgical intervention  I had discussion with patient's daughter-in-law at length today and plan is to follow-up with palliative care consult tomorrow for goals of care discussion  Anemia  Assessment & Plan  No signs of active overt bleeding noted  Hemodynamically stable  Currently hemoglobin stable around 7-8 range  Now on comfort care  History of DVT (deep vein thrombosis)  Assessment & Plan  I have restarted the patient's Coumadin today  Patient has a history of factor 5 Leiden  INR is now therapeutic  Will put the patient on her 4 mg daily as she normally takes  Generalized weakness  Assessment & Plan  DC home with hospice are with comfort care per pt and family request      Benign hypertension with chronic kidney disease, stage III [facility])  Assessment & Plan  Patient received CT with contrast earlier as well as Lasix  Now noted with elevated BUN and creatinine  29/1 41  Now cr 1 38  Monitor on renal function with a p o  Intake  Will consider nephrology consult if not improving or worsening  VTE Pharmacologic Prophylaxis:   Pharmacologic: anemia, now on comfort care    Patient Centered Rounds: I have performed bedside rounds with nursing staff today  Education and Discussions with Family / Patient: spoke with daughter in law over the phone at length  Time Spent for Care: 30 minutes  More than 50% of total time spent on counseling and coordination of care as described above      Current Length of Stay: 10 day(s)    Current Patient Status: Inpatient Certification Statement: The patient will continue to require additional inpatient hospital stay due to Hospice care being set up at home  Discharge Plan:  Tomorrow since hospice care has not been set up per daughter-in-law  Code Status: Level 3 - DNAR and DNI      Subjective:   Patient is afebrile, hemodynamically stable  O2 saturation 90-91% on 6-7 L nasal cannula not in respiratory distress  Complaining of fatigue and tiredness  Denies chest pain, dyspnea, cough, fever, chills, diarrhea, any other new complaints  Objective:     Vitals:   Temp (24hrs), Av °F (36 7 °C), Min:97 7 °F (36 5 °C), Max:98 2 °F (36 8 °C)    Temp:  [97 7 °F (36 5 °C)-98 2 °F (36 8 °C)] 98 2 °F (36 8 °C)  HR:  [79-89] 84  Resp:  [18-20] 18  BP: (140-170)/(66-76) 153/70  SpO2:  [89 %-95 %] 93 %  Body mass index is 26 26 kg/m²  Input and Output Summary (last 24 hours): Intake/Output Summary (Last 24 hours) at 2020 1149  Last data filed at 2020 0830  Gross per 24 hour   Intake 480 ml   Output 925 ml   Net -445 ml       Physical Exam:     Physical Exam   Constitutional: No distress  Chronically ill-appearing, not in acute distress  HENT:   Head: Normocephalic and atraumatic  Eyes: Pupils are equal, round, and reactive to light  EOM are normal    Neck: Normal range of motion  Neck supple  No JVD present  Cardiovascular: Normal rate and regular rhythm  Pulmonary/Chest: Effort normal and breath sounds normal  No stridor  No respiratory distress  Abdominal: Soft  Bowel sounds are normal  She exhibits no distension  There is no tenderness  Musculoskeletal: Normal range of motion  Neurological:   Patient is awake, alert, oriented to person, not oriented to place or time  Currently cooperative during exam   Poor historian  Skin: She is not diaphoretic     Psychiatric: Her behavior is normal        Additional Data:     Labs:    Results from last 7 days   Lab Units 20  0840   WBC Thousand/uL 12 15*   HEMOGLOBIN g/dL 8 4*   HEMATOCRIT % 27 1*   PLATELETS Thousands/uL 81*   NEUTROS PCT % 82*   LYMPHS PCT % 10*   MONOS PCT % 6   EOS PCT % 0     Results from last 7 days   Lab Units 07/01/20  0608 06/30/20  0439   SODIUM mmol/L 136 136   POTASSIUM mmol/L 4 2 4 0   CHLORIDE mmol/L 101 101   CO2 mmol/L 26 28   BUN mg/dL 39* 30*   CREATININE mg/dL 1 38* 1 51*   ANION GAP mmol/L 9 7   CALCIUM mg/dL 8 6 8 5   ALBUMIN g/dL  --  2 0*   TOTAL BILIRUBIN mg/dL  --  0 40   ALK PHOS U/L  --  185*   ALT U/L  --  24   AST U/L  --  27   GLUCOSE RANDOM mg/dL 145* 163*     Results from last 7 days   Lab Units 07/02/20  0915   INR  3 89*     Results from last 7 days   Lab Units 06/27/20  2123   POC GLUCOSE mg/dl 164*         Results from last 7 days   Lab Units 07/01/20  0608 06/30/20  1500   PROCALCITONIN ng/ml 0 17 0 26*           * I Have Reviewed All Lab Data Listed Above  * Additional Pertinent Lab Tests Reviewed: All Labs Within Last 24 Hours Reviewed      Recent Cultures (last 7 days):           Last 24 Hours Medication List:     Current Facility-Administered Medications:  acetaminophen 650 mg Oral Q6H PRN Geronimo Terell, DO   albuterol 2 puff Inhalation Q4H PRN Geronimo Terell, DO   ferrous sulfate 325 mg Oral Daily With Breakfast Celia Ayana, DO   fluticasone 1 spray Nasal Daily PRN Geronimo Terell, DO   pantoprazole 20 mg Oral Early Morning Ceila Ayana, DO   polyethylene glycol 17 g Oral Daily PRN Geronimo Terell, DO   predniSONE 40 mg Oral Daily Masoud BLAIR MD   sodium chloride 1 spray Each Nare Q1H PRN Cristina Greenfield MD   sodium chloride 2 g Oral TID With Meals Geronimo Terell, DO        Today, Patient Was Seen By: Sabrina Weeks MD    ** Please Note: Dictation voice to text software may have been used in the creation of this document   **

## 2020-07-02 NOTE — PLAN OF CARE
Problem: OCCUPATIONAL THERAPY ADULT  Goal: Performs self-care activities at highest level of function for planned discharge setting  See evaluation for individualized goals  Description  Treatment Interventions: ADL retraining, Functional transfer training, UE strengthening/ROM, Endurance training, Cognitive reorientation, Patient/family training, Equipment evaluation/education, Compensatory technique education, Energy conservation, Activityengagement, Continued evaluation          See flowsheet documentation for full assessment, interventions and recommendations  Outcome: Progressing  Note:   Limitation: Decreased ADL status, Decreased UE strength, Decreased Safe judgement during ADL, Decreased cognition, Decreased endurance, Decreased self-care trans, Decreased high-level ADLs  Prognosis: Good  Assessment: Patient participated in Skilled OT session this date with interventions consisting of safety awareness and fall prevention techniques,  therapeutic activities to: increase activity tolerance, increase postural control, increase trunk control and increase OOB/ sitting tolerance, and functional transfer training  Patient agreeable to OT treatment session, upon arrival patient was found supine in bed, alert, responsive  and in no apparent distress  In comparison to previous session at time of evaluation, patient with improvements in functional transfers; please see above flowsheet for transfer assist levels  Patient with fair static sitting balance while seated EOB  Patient demonstrates decreased activity tolerance and she reported, "I feel weak " Patient requiring verbal cues for safety, verbal cues for correct technique and frequent rest periods  Patient continues to be functioning below baseline level, occupational performance remains limited secondary to factors listed above and increased risk for falls and injury  From OT standpoint, recommendation at time of d/c would be Short Term Rehab     Patient to benefit from continued Occupational Therapy treatment while in the hospital to address deficits as defined above and maximize level of functional independence with ADLs and functional mobility        OT Discharge Recommendation: Post-Acute Rehabilitation Services

## 2020-07-02 NOTE — SPEECH THERAPY NOTE
Pt seen for f/u to swallowing evaluation  Chart reviewed and pt seen briefly at bedside  SHe took ensure and OJ with no s/s oral/pharyngeal dysphagia or aspiration  Reflux precautions maintained  Plan for d/c home today  Diet/precautions documented for d/c

## 2020-07-02 NOTE — DISCHARGE INSTR - DIET
Recommended diet: as desired (consider soft easily chewed food)    Precautions: upright posture, only feed when fully alert, slow rate of feeding  - individual sips of liquids (so as to not further challenge breathing)  -consider placing soups in  for maximized ease and safety  -consider pills cut or crushed and in applesauce (with sugar) or pudding etc

## 2020-07-02 NOTE — PHYSICAL THERAPY NOTE
PT Treatment Note     07/02/20 1201   Pain Assessment   Pain Assessment Tool 0-10   Pain Score No Pain   Restrictions/Precautions   Weight Bearing Precautions Per Order No   Other Precautions Bed Alarm; Chair Alarm;O2;Fall Risk;Cognitive;Contact/isolation   General   Chart Reviewed Yes   Family/Caregiver Present Yes   Cognition   Overall Cognitive Status Impaired   Arousal/Participation Alert; Responsive   Attention Within functional limits   Orientation Level Oriented to person;Oriented to place   Memory Decreased short term memory;Decreased recall of recent events;Decreased recall of precautions   Following Commands Follows one step commands with increased time or repetition   Comments Pt  was agreeable to PT treatment session   Subjective   Subjective "I can sit up"   Bed Mobility   Rolling R 4  Minimal assistance   Additional items Assist x 1;HOB elevated;Verbal cues;LE management   Supine to Sit 4  Minimal assistance   Additional items Assist x 2;HOB elevated; Bedrails; Increased time required;Verbal cues;LE management   Additional Comments pt received lying supine in bed upon PT arrival; at end of session: pt seated OOB to recliner chair w/ all needs within reach and chair alarm activated   Transfers   Sit to Stand 3  Moderate assistance   Additional items Assist x 2; Increased time required;Verbal cues   Stand to Sit 3  Moderate assistance   Additional items Assist x 2; Increased time required;Verbal cues   Stand pivot 3  Moderate assistance   Additional items Assist x 2; Increased time required;Verbal cues   Additional Comments Pt  seated EOB with close (S) for safety with static sitting x 8 minutes  Ambulation/Elevation   Gait pattern Decreased foot clearance; Short stride; Step to;Excessively slow   Gait Assistance 3  Moderate assist   Additional items Assist x 2;Verbal cues; Tactile cues   Assistive Device Rolling walker   Distance 3 steps to chair   Stair Management Assistance Not tested   Balance   Static Sitting Fair   Dynamic Sitting Fair -   Static Standing Poor +   Dynamic Standing Poor   Ambulatory Poor   Endurance Deficit   Endurance Deficit Yes   Activity Tolerance   Activity Tolerance Patient limited by fatigue;Patient limited by pain   Medical Staff Made Aware KELLI Capellan   Nurse Made Aware RN ok to see for therapy   Assessment   Prognosis Good   Problem List Decreased strength;Decreased endurance; Impaired balance;Decreased mobility   Assessment Pt seen for PT treatment session this date with interventions consisting of therapeutic activity consisting of training: bed mobility, supine<>sit transfers, sit<>stand transfers, static sitting tolerance at EOB for 8 minutes w/ B/L UE support and stand pivot transfers towards Left direction  Pt agreeable to PT treatment session upon arrival, pt found supine in bed w/ HOB elevated, in no apparent distress  In comparison to previous session, pt with improvements in mobility and activity tolerance with Mod A x2  Post session: pt returned back to recliner, chair alarm engaged, all needs in reach and RN notified of session findings/recommendations Continue to recommend post acute rehabilitation at time of d/c in order to maximize pt's functional independence and safety w/ mobility  Pt continues to be functioning below baseline level, and remains limited 2* factors listed above and including strength deficits, balance deficits, decreased activity tolerance, decreased functional mobility  PT will continue to see pt while here in order to address the deficits listed above and provide interventions consistent w/ POC in effort to achieve STGs  Barriers to Discharge Decreased caregiver support; Inaccessible home environment   Goals   Patient Goals to be myself again   STG Expiration Date 07/09/20   Short Term Goal #1 In 7-10 days: Increase bilateral LE strength 1/2 grade to facilitate independent mobility, Perform all bed mobility tasks modified independent to decrease caregiver burden, Perform all transfers with distant S to improve independence, Ambulate > 150 ft  with least restrictive assistive device with distant S w/o LOB and w/ normalized gait pattern 100% of the time, Navigate 3 stairs x 3 trials with distant S with unilateral handrail to facilitate return to previous living environment and Increase all balance 1 grade to decrease risk for falls   PT Treatment Day 2   Plan   Treatment/Interventions Functional transfer training;LE strengthening/ROM; Endurance training; Therapeutic exercise;Elevations; Bed mobility;Gait training;Equipment eval/education;Patient/family training   PT Frequency   (3-5x/week)   Recommendation   PT Discharge Recommendation Post-Acute Rehabilitation Services   Equipment Recommended Walker  (RW)   PT - OK to Discharge Yes      Sarah Pollack, PT

## 2020-07-02 NOTE — OCCUPATIONAL THERAPY NOTE
Occupational Therapy Treatment Note        Patient Name: Shree Beaver  Today's Date: 7/2/2020 07/02/20 1136   Restrictions/Precautions   Weight Bearing Precautions Per Order No   Braces or Orthoses Other (Comment)  (elastic back support prn)   Other Precautions Chair Alarm; Bed Alarm;O2;Fall Risk;Cognitive;Contact/isolation   Pain Assessment   Pain Assessment Tool 0-10   Pain Score No Pain   Functional Standing Tolerance   Time ~1-2 minutes   Activity Functional transfer training   Comments w/ RW   Bed Mobility   Rolling R 4  Minimal assistance   Additional items Assist x 1;HOB elevated; Increased time required;Verbal cues;LE management   Supine to Sit 4  Minimal assistance   Additional items Assist x 2;HOB elevated; Bedrails; Increased time required;Verbal cues;LE management   Additional Comments pt received lying supine in bed upon OT arrival; at end of session: pt seated OOB to recliner chair w/ all needs within reach and chair alarm activated   Transfers   Sit to Stand 3  Moderate assistance   Additional items Assist x 2; Increased time required;Verbal cues   Stand to Sit 3  Moderate assistance   Additional items Assist x 2; Increased time required;Verbal cues;Armrests   Stand pivot 3  Moderate assistance   Additional items Assist x 2; Increased time required;Verbal cues   Additional Comments Performed w/ RW   Cognition   Overall Cognitive Status Impaired   Arousal/Participation Alert; Responsive   Attention Within functional limits   Orientation Level Oriented to person;Oriented to place   Memory Decreased short term memory;Decreased recall of recent events;Decreased recall of precautions   Following Commands Follows one step commands with increased time or repetition   Comments Pt agreeable to OT treatment session   Activity Tolerance   Activity Tolerance Patient limited by fatigue   Medical Staff Made Aware MARVIN Smith confirmed pt appropriate for therapy and made aware of session outcomes; PT [facility] Assessment   Assessment Patient participated in Skilled OT session this date with interventions consisting of safety awareness and fall prevention techniques,  therapeutic activities to: increase activity tolerance, increase postural control, increase trunk control and increase OOB/ sitting tolerance, and functional transfer training  Patient agreeable to OT treatment session, upon arrival patient was found supine in bed, alert, responsive  and in no apparent distress  In comparison to previous session at time of evaluation, patient with improvements in functional transfers; please see above flowsheet for transfer assist levels  Patient with fair static sitting balance while seated EOB  Patient demonstrates decreased activity tolerance and she reported, "I feel weak " Patient requiring verbal cues for safety, verbal cues for correct technique and frequent rest periods  Patient continues to be functioning below baseline level, occupational performance remains limited secondary to factors listed above and increased risk for falls and injury  From OT standpoint, recommendation at time of d/c would be Short Term Rehab  Patient to benefit from continued Occupational Therapy treatment while in the hospital to address deficits as defined above and maximize level of functional independence with ADLs and functional mobility  Plan   Treatment Interventions ADL retraining;Functional transfer training;UE strengthening/ROM; Endurance training;Cognitive reorientation;Patient/family training;Equipment evaluation/education; Compensatory technique education; Energy conservation; Activityengagement;Continued evaluation   Goal Expiration Date 07/07/20   OT Treatment Day 1   OT Frequency 3-5x/wk   Recommendation   OT Discharge Recommendation Post-Acute Rehabilitation Services   Modified Farwell Scale   Modified Farwell Scale 4     Yoon Redd OTR/L

## 2020-07-03 VITALS
TEMPERATURE: 99 F | RESPIRATION RATE: 20 BRPM | HEIGHT: 60 IN | WEIGHT: 134.48 LBS | SYSTOLIC BLOOD PRESSURE: 155 MMHG | HEART RATE: 86 BPM | OXYGEN SATURATION: 94 % | BODY MASS INDEX: 26.4 KG/M2 | DIASTOLIC BLOOD PRESSURE: 70 MMHG

## 2020-07-03 LAB
INR PPP: 2.4 (ref 0.84–1.19)
PROTHROMBIN TIME: 25 SECONDS (ref 11.6–14.5)

## 2020-07-03 PROCEDURE — 85610 PROTHROMBIN TIME: CPT | Performed by: STUDENT IN AN ORGANIZED HEALTH CARE EDUCATION/TRAINING PROGRAM

## 2020-07-03 PROCEDURE — 99239 HOSP IP/OBS DSCHRG MGMT >30: CPT | Performed by: STUDENT IN AN ORGANIZED HEALTH CARE EDUCATION/TRAINING PROGRAM

## 2020-07-03 RX ORDER — POLYETHYLENE GLYCOL 3350 17 G/17G
17 POWDER, FOR SOLUTION ORAL DAILY PRN
Qty: 14 EACH | Refills: 0 | Status: SHIPPED | OUTPATIENT
Start: 2020-07-03

## 2020-07-03 RX ORDER — CEFDINIR 300 MG/1
300 CAPSULE ORAL EVERY 24 HOURS
Status: DISCONTINUED | OUTPATIENT
Start: 2020-07-04 | End: 2020-07-03 | Stop reason: HOSPADM

## 2020-07-03 RX ORDER — CEFDINIR 300 MG/1
300 CAPSULE ORAL EVERY 24 HOURS
Qty: 4 CAPSULE | Refills: 0 | Status: SHIPPED | OUTPATIENT
Start: 2020-07-04 | End: 2020-07-04

## 2020-07-03 RX ORDER — PREDNISONE 20 MG/1
TABLET ORAL
Qty: 10 TABLET | Refills: 0 | Status: SHIPPED | OUTPATIENT
Start: 2020-07-04 | End: 2020-07-03

## 2020-07-03 RX ORDER — CEFDINIR 300 MG/1
300 CAPSULE ORAL EVERY 12 HOURS SCHEDULED
Status: DISCONTINUED | OUTPATIENT
Start: 2020-07-03 | End: 2020-07-03

## 2020-07-03 RX ORDER — ACETAMINOPHEN 325 MG/1
650 TABLET ORAL EVERY 6 HOURS PRN
Qty: 30 TABLET | Refills: 0 | Status: SHIPPED | OUTPATIENT
Start: 2020-07-03

## 2020-07-03 RX ORDER — PREDNISONE 20 MG/1
TABLET ORAL
Qty: 10 TABLET | Refills: 0 | Status: SHIPPED | OUTPATIENT
Start: 2020-07-04 | End: 2020-07-12

## 2020-07-03 RX ADMIN — FERROUS SULFATE TAB 325 MG (65 MG ELEMENTAL FE) 325 MG: 325 (65 FE) TAB at 08:44

## 2020-07-03 RX ADMIN — CEFDINIR 300 MG: 300 CAPSULE ORAL at 08:44

## 2020-07-03 RX ADMIN — Medication 2 G: at 08:44

## 2020-07-03 RX ADMIN — PREDNISONE 40 MG: 20 TABLET ORAL at 08:44

## 2020-07-03 RX ADMIN — PANTOPRAZOLE SODIUM 20 MG: 20 TABLET, DELAYED RELEASE ORAL at 05:44

## 2020-07-03 NOTE — PLAN OF CARE
Problem: Prexisting or High Potential for Compromised Skin Integrity  Goal: Skin integrity is maintained or improved  Description  INTERVENTIONS:  - Identify patients at risk for skin breakdown  - Assess and monitor skin integrity  - Assess and monitor nutrition and hydration status  - Monitor labs   - Assess for incontinence   - Turn and reposition patient  - Assist with mobility/ambulation  - Relieve pressure over bony prominences  - Avoid friction and shearing  - Provide appropriate hygiene as needed including keeping skin clean and dry  - Evaluate need for skin moisturizer/barrier cream  - Collaborate with interdisciplinary team   - Patient/family teaching  - Consider wound care consult   Outcome: Progressing     Problem: Potential for Falls  Goal: Patient will remain free of falls  Description  INTERVENTIONS:  - Assess patient frequently for physical needs  -  Identify cognitive and physical deficits and behaviors that affect risk of falls    -  [facility] [place] fall precautions as indicated by assessment   - Educate patient/family on patient safety including physical limitations  - Instruct patient to call for assistance with activity based on assessment  - Modify environment to reduce risk of injury  - Consider OT/PT consult to assist with strengthening/mobility  Outcome: Progressing     Problem: PAIN - ADULT  Goal: Verbalizes/displays adequate comfort level or baseline comfort level  Description  Interventions:  - Encourage patient to monitor pain and request assistance  - Assess pain using appropriate pain scale  - Administer analgesics based on type and severity of pain and evaluate response  - Implement non-pharmacological measures as appropriate and evaluate response  - Consider cultural and social influences on pain and pain management  - Notify physician/advanced practitioner if interventions unsuccessful or patient reports new pain  Outcome: Progressing     Problem: SAFETY ADULT  Goal: Maintain or return to baseline ADL function  Description  INTERVENTIONS:  -  Assess patient's ability to carry out ADLs; assess patient's baseline for ADL function and identify physical deficits which impact ability to perform ADLs (bathing, care of mouth/teeth, toileting, grooming, dressing, etc )  - Assess/evaluate cause of self-care deficits   - Assess range of motion  - Assess patient's mobility; develop plan if impaired  - Assess patient's need for assistive devices and provide as appropriate  - Encourage maximum independence but intervene and supervise when necessary  - Involve family in performance of ADLs  - Assess for home care needs following discharge   - Consider OT consult to assist with ADL evaluation and planning for discharge  - Provide patient education as appropriate  Outcome: Progressing  Goal: Maintain or return mobility status to optimal level  Description  INTERVENTIONS:  - Assess patient's baseline mobility status (ambulation, transfers, stairs, etc )    - Identify cognitive and physical deficits and behaviors that affect mobility  - Identify mobility aids required to assist with transfers and/or ambulation (gait belt, sit-to-stand, lift, walker, cane, etc )  - [place] fall precautions as indicated by assessment  - Record patient progress and toleration of activity level on Mobility SBAR; progress patient to next Phase/Stage  - Instruct patient to call for assistance with activity based on assessment  - Consider rehabilitation consult to assist with strengthening/weightbearing, etc   Outcome: Progressing     Problem: DISCHARGE PLANNING  Goal: Discharge to home or other facility with appropriate resources  Description  INTERVENTIONS:  - Identify barriers to discharge w/patient and caregiver  - Arrange for needed discharge resources and transportation as appropriate  - Identify discharge learning needs (meds, wound care, etc )  - Arrange for interpretive services to assist at discharge as needed  - Refer to Case Management Department for coordinating discharge planning if the patient needs post-hospital services based on physician/advanced practitioner order or complex needs related to functional status, cognitive ability, or social support system  Outcome: Progressing     Problem: Knowledge Deficit  Goal: Patient/family/caregiver demonstrates understanding of disease process, treatment plan, medications, and discharge instructions  Description  Complete learning assessment and assess knowledge base  Interventions:  - Provide teaching at level of understanding  - Provide teaching via preferred learning methods  Outcome: Progressing     Problem: Nutrition/Hydration-ADULT  Goal: Nutrient/Hydration intake appropriate for improving, restoring or maintaining nutritional needs  Description  Monitor and assess patient's nutrition/hydration status for malnutrition  Collaborate with interdisciplinary team and initiate plan and interventions as ordered  Monitor patient's weight and dietary intake as ordered or per policy  Utilize nutrition screening tool and intervene as necessary  Determine patient's food preferences and provide high-protein, high-caloric foods as appropriate       INTERVENTIONS:  - Monitor oral intake, urinary output, labs, and treatment plans  - Assess nutrition and hydration status and recommend course of action  - Evaluate amount of meals eaten  - Assist patient with eating if necessary   - Allow adequate time for meals  - Recommend/ encourage appropriate diets, oral nutritional supplements, and vitamin/mineral supplements  - Order, calculate, and assess calorie counts as needed  - Recommend, monitor, and adjust tube feedings based on assessed needs  - Assess need for intravenous fluids  - Provide  nutrition/hydration education as appropriate  - Include patient/family/caregiver in decisions related to nutrition   Outcome: Progressing     Problem: GENITOURINARY - ADULT  Goal: Maintains or returns to baseline urinary function  Description  INTERVENTIONS:  - Assess urinary function  - Encourage oral fluids to ensure adequate hydration if ordered  - Administer IV fluids as ordered to ensure adequate hydration  - Administer ordered medications as needed  - Offer frequent toileting  - Follow urinary retention protocol if ordered  Outcome: Progressing  Goal: Urinary catheter remains patent  Description  INTERVENTIONS:  - Assess patency of urinary catheter  - If patient has a chronic lentz, consider changing catheter if non-functioning  - Follow guidelines for intermittent irrigation of non-functioning urinary catheter  Outcome: Progressing

## 2020-07-03 NOTE — PROGRESS NOTES
EMS picked up pt for home with hospice  Discharge papers given to EMS team  2 vases with flowers and some cards left behind  Spoke with daughter in law and told her that someone can  these belongings at their convenience

## 2020-07-03 NOTE — DISCHARGE SUMMARY
Discharge- Felicitas Jarrell 9/20/1930, 80 y o  female MRN: [id number]    Unit/Bed#: -01 Encounter: [id number]    Primary Care Provider: Yuly Bee DO   Date and time admitted to hospital: 6/22/2020 10:22 AM        * Urinary tract infection associated with indwelling urethral catheter [facility])  Assessment & Plan  Patient has a history of a neurogenic bladder with an indwelling Smith catheter, was scheduled for urology follow-up outpatient 7/24  She has a few day history of cloudy urine, generalized weakness, and some episodes a hematuria last week  - UA showed innumerable bacteria  - creatinine 1 17, history of CKD 3  - CT of the abdomen showed hydroureternephrosis, with some bladder wall thickening and need for follow-up cystoscopy to rule out bladder cancer  Family wanted to forego evaluation as far as that goes secondary to needing general anesthesia  Completed IV cefepime tx  Remained afebrile and stable off of abx  Plan is to go home on comfort care/ hospice care with compassionate care per patient and family's request   Home hospice has been arranged per cm  Acute respiratory failure with hypoxemia [facility])  Assessment & Plan  Patient has been requiring oxygen here  They did bump her up to 5 L  Patient is still on 5 L right now  I think this could be volume overload  CT scan of the chest yesterday which showed some opacities  COVID-19 test negative x2    Possibly due to aspiration pneumonitis  Today clinically patient appears comfortable not in distress  O2 saturation improved to 97% on non-rebreather  Repeat chest x-ray shows course lung findings noted worsening from previous study: hypoinflation vs congestion  Echo report noted with EF of 60% with grade 1 diastolic dysfunction  Clinically does not appear to be overtly fluid overloaded  procalcitonin slightly elevated  Hold Lasix now in the settings of elevated BUN creatinine  Transition to po prednisone    Started on Dysphagia diet 3 with thin liquids per speech and swallow eval    Continue to monitor renal function, consider pulmonology consult  Continue with aspiration precaution  Will discharge home on p o  Omnicef for 5 days  Overall poor prognosis  Pt  Does not want any agressive and heroic Mesures  DC home with hospice care with compressionate care per pt and family request    Palliative care input appreciated  CM to work on hospice set up  Hydroureteronephrosis  Assessment & Plan  Patient does not want any aggressive surgical intervention  I had discussion with patient's daughter-in-law at length today and plan is to follow-up with palliative care consult tomorrow for goals of care discussion  Anemia  Assessment & Plan  No signs of active overt bleeding noted  Hemodynamically stable  Currently hemoglobin stable around 7-8 range  Now on comfort care  History of DVT (deep vein thrombosis)  Assessment & Plan  I have restarted the patient's Coumadin today  Patient has a history of factor 5 Leiden  INR is now therapeutic  Resume home dose Coumadin upon discharge  Patient is now on hospice/comfort care  Generalized weakness  Assessment & Plan  DC home with hospice are with comfort care per pt and family request      Benign hypertension with chronic kidney disease, stage III [facility])  Assessment & Plan  Patient received CT with contrast earlier as well as Lasix  Now noted with elevated BUN and creatinine   29/1 41  Now cr 1 38  Discharge home on comfort care/hospice care per patient and family's request       Discharging Physician / Practitioner: Keagan Ramos MD  PCP: Emiliana Amador DO  Admission Date:   Admission Orders (From admission, onward)     Ordered        06/22/20 1420  Inpatient Admission  Once         06/22/20 1335  Inpatient Admission  Once,   Status:  Canceled                   Discharge Date: 07/03/20    Resolved Problems  Date Reviewed: 7/3/2020    None          [facility]:  · Gyn Oncology, medical oncology, pulmonology, palliative Care, wound care      Reason for Admission:  Generalized weakness and cloudy urine  Recurrent UTI secondary to indwelling Smith catheter  Hospital Course:     Madhu Oconnell is a 80 y o  female patient with past medical history of breast cancer and ovarian cancer and neurogenic bladder with chronic indwelling Smith catheter , history of recurrent UTI, CKD stage 3, DVT, anemia, who originally presented to the hospital on 6/22/2020 due to recurrent UTI secondary to indwelling Smith catheter  CT may showed moderate to severe right hydronephrosis, bladder wall thickening and enlarged left pelvic sidewall lymphadenopathy  Patient completed antibiotic course  Urology was consulted considering of continue workup for bladder wall thickening with cystoscopy for tissue diagnosis  Medical oncology was also consulted  Gyn oncology was also consulted for possible recurrence of breast versus ovarian cancer versus bladder malignancy with metastasis  , CA 15-3, CA 27 29 all are elevated  Patient has a poor functional status and not a good candidate for chemotherapy this time per Oncology  IR guided biopsy and urologic procedure was offered  Patient and family had decided against any further surgical interventions or any aggressive measures and against  biopsy in the setting of likely metastatic disease and patient is poor functional status  Patient was also noted to have respiratory failure with hypoxia due to aspiration  COVID-19 negative  O2 saturation 95-97% on non-rebreather  Not in acute respiratory distress  Patient also seen by palliative care and currently plan is to discharge home on hospice care/comfort care with compassionate care services  Plan is to continue dysphagia 3 diet with thin liquids for aspiration  Maintain aspiration precaution at home    Since patient had low-grade fever and recurrent aspiration will discharge home on 5 days of Omnicef treatment  Can consider to add Flagyl as outpatient basis as well  Currently patient appears deconditioned, tired and her goal is to go home and be comfortable  Had lengthy discussion with patient's daughter-in-law Citlalyserge Craft who is a point of contact for pt  All questions answered appropriately and plan is to discharge home today with hospice care with compassionate care services  No other events reported  Refer to earlier notes for further clarification  Please see above list of diagnoses and related plan for additional information  Condition at Discharge: stable     Discharge Day Visit / Exam:     Subjective:  Currently she is afebrile, hemodynamically stable  Appears ill, deconditioned  Complaining of feeling tired and her goal is to go home and be comfortable  Does not offer any other complaints at this time  No other events reported  Plan is to discharge home with comfort care/hospice care per her and family's request     Vitals: Blood Pressure: 155/70 (07/03/20 0743)  Pulse: 86 (07/03/20 0743)  Temperature: 99 °F (37 2 °C) (07/03/20 0743)  Temp Source: Oral (07/03/20 0743)  Respirations: 20 (07/03/20 0743)  Height: 5' (152 4 cm) (06/22/20 1758)  Weight - Scale: 61 kg (134 lb 7 7 oz) (06/22/20 1758)  SpO2: 94 % (07/03/20 0743)  Exam:   Physical Exam   Constitutional: No distress  Ill-appearing, deconditioned, not in acute distress  HENT:   Head: Normocephalic and atraumatic  Eyes: Pupils are equal, round, and reactive to light  EOM are normal    Neck: Normal range of motion  Neck supple  No JVD present  Cardiovascular: Normal rate and regular rhythm  Pulmonary/Chest: Effort normal and breath sounds normal  No stridor  No respiratory distress  Abdominal: Soft  Bowel sounds are normal  She exhibits no distension  There is no tenderness  Musculoskeletal: Normal range of motion  Neurological:   Currently patient is awake, alert, oriented at her baseline    Cooperative during exam    Skin: She is not diaphoretic  Psychiatric: Her behavior is normal        Discussion with Family: spoke with daughter-in-law Mable Lee who is point of contact for pt  And answered all questions appropriately  Discharge instructions/Information to patient and family:   See after visit summary for information provided to patient and family  Provisions for Follow-Up Care:  See after visit summary for information related to follow-up care and any pertinent home health orders  Disposition:     Other: Home on hospice care  For Discharges to [facility]:   · Not Applicable to this Patient - Not Applicable to this Patient    Planned Readmission:  None     Discharge Statement:  I spent 40 minutes discharging the patient  This time was spent on the day of discharge  I had direct contact with the patient on the day of discharge  Greater than 50% of the total time was spent examining patient, answering all patient questions, arranging and discussing plan of care with patient as well as directly providing post-discharge instructions  Additional time then spent on discharge activities  Discharge Medications:  See after visit summary for reconciled discharge medications provided to patient and family        ** Please Note: This note has been constructed using a voice recognition system **

## 2020-07-04 RX ORDER — CEFDINIR 300 MG/1
300 CAPSULE ORAL EVERY 24 HOURS
Qty: 4 CAPSULE | Refills: 0 | Status: SHIPPED | OUTPATIENT
Start: 2020-07-04 | End: 2020-07-08